# Patient Record
Sex: MALE | Race: WHITE | NOT HISPANIC OR LATINO | Employment: FULL TIME | URBAN - METROPOLITAN AREA
[De-identification: names, ages, dates, MRNs, and addresses within clinical notes are randomized per-mention and may not be internally consistent; named-entity substitution may affect disease eponyms.]

---

## 2017-02-21 ENCOUNTER — TELEPHONE (OUTPATIENT)
Dept: FAMILY MEDICINE | Facility: OTHER | Age: 57
End: 2017-02-21

## 2017-02-21 NOTE — TELEPHONE ENCOUNTER
Panel Management Review      Patient has the following on his problem list: None      Composite cancer screening  Chart review shows that this patient is due/due soon for the following None  Summary:    Patient is due/failing the following:   Glucose, BMP, lipids    Action needed:   Patient needs fasting lab only appointment    Type of outreach:    Phone, left message for patient to call back.     Questions for provider review:    None                                                                                                                                    Maryan House CMA       Chart routed to Care Team .

## 2017-04-03 ENCOUNTER — DOCUMENTATION ONLY (OUTPATIENT)
Dept: LAB | Facility: OTHER | Age: 57
End: 2017-04-03

## 2017-04-03 DIAGNOSIS — Z79.899 ENCOUNTER FOR LONG-TERM (CURRENT) USE OF MEDICATIONS: Primary | ICD-10-CM

## 2017-04-04 DIAGNOSIS — E78.5 HYPERLIPIDEMIA WITH TARGET LDL LESS THAN 130: ICD-10-CM

## 2017-04-04 DIAGNOSIS — Z79.899 ENCOUNTER FOR LONG-TERM (CURRENT) USE OF MEDICATIONS: ICD-10-CM

## 2017-04-04 DIAGNOSIS — R73.01 IMPAIRED FASTING GLUCOSE: ICD-10-CM

## 2017-04-04 LAB
BASOPHILS # BLD AUTO: 0 10E9/L (ref 0–0.2)
BASOPHILS NFR BLD AUTO: 0.2 %
DIFFERENTIAL METHOD BLD: ABNORMAL
EOSINOPHIL # BLD AUTO: 0 10E9/L (ref 0–0.7)
EOSINOPHIL NFR BLD AUTO: 0.6 %
ERYTHROCYTE [DISTWIDTH] IN BLOOD BY AUTOMATED COUNT: 11.6 % (ref 10–15)
ERYTHROCYTE [SEDIMENTATION RATE] IN BLOOD BY WESTERGREN METHOD: 10 MM/H (ref 0–20)
HCT VFR BLD AUTO: 42.3 % (ref 40–53)
HGB BLD-MCNC: 14.9 G/DL (ref 13.3–17.7)
LYMPHOCYTES # BLD AUTO: 1.6 10E9/L (ref 0.8–5.3)
LYMPHOCYTES NFR BLD AUTO: 25.2 %
MCH RBC QN AUTO: 33.5 PG (ref 26.5–33)
MCHC RBC AUTO-ENTMCNC: 35.2 G/DL (ref 31.5–36.5)
MCV RBC AUTO: 95 FL (ref 78–100)
MONOCYTES # BLD AUTO: 0.5 10E9/L (ref 0–1.3)
MONOCYTES NFR BLD AUTO: 8.5 %
NEUTROPHILS # BLD AUTO: 4.2 10E9/L (ref 1.6–8.3)
NEUTROPHILS NFR BLD AUTO: 65.5 %
PLATELET # BLD AUTO: 131 10E9/L (ref 150–450)
RBC # BLD AUTO: 4.45 10E12/L (ref 4.4–5.9)
WBC # BLD AUTO: 6.4 10E9/L (ref 4–11)

## 2017-04-04 PROCEDURE — 85025 COMPLETE CBC W/AUTO DIFF WBC: CPT | Performed by: FAMILY MEDICINE

## 2017-04-04 PROCEDURE — 36415 COLL VENOUS BLD VENIPUNCTURE: CPT | Performed by: FAMILY MEDICINE

## 2017-04-04 PROCEDURE — 82947 ASSAY GLUCOSE BLOOD QUANT: CPT | Performed by: FAMILY MEDICINE

## 2017-04-04 PROCEDURE — 80061 LIPID PANEL: CPT | Performed by: FAMILY MEDICINE

## 2017-04-04 PROCEDURE — 84450 TRANSFERASE (AST) (SGOT): CPT | Performed by: FAMILY MEDICINE

## 2017-04-04 PROCEDURE — 85652 RBC SED RATE AUTOMATED: CPT | Performed by: FAMILY MEDICINE

## 2017-04-04 PROCEDURE — 86140 C-REACTIVE PROTEIN: CPT | Performed by: FAMILY MEDICINE

## 2017-04-04 PROCEDURE — 82565 ASSAY OF CREATININE: CPT | Performed by: FAMILY MEDICINE

## 2017-04-05 LAB
AST SERPL W P-5'-P-CCNC: 15 U/L (ref 0–45)
CHOLEST SERPL-MCNC: 180 MG/DL
CREAT SERPL-MCNC: 0.95 MG/DL (ref 0.66–1.25)
CRP SERPL-MCNC: <2.9 MG/L (ref 0–8)
GFR SERPL CREATININE-BSD FRML MDRD: 81 ML/MIN/1.7M2
GLUCOSE SERPL-MCNC: 92 MG/DL (ref 70–99)
HDLC SERPL-MCNC: 50 MG/DL
LDLC SERPL CALC-MCNC: 101 MG/DL
NONHDLC SERPL-MCNC: 130 MG/DL
TRIGL SERPL-MCNC: 147 MG/DL

## 2017-04-05 NOTE — PROGRESS NOTES
Jaskaran, your blood sugar and cholesterol look good.  Please let me know if you have any questions.    Becki Fernandez MD

## 2017-05-11 ENCOUNTER — OFFICE VISIT (OUTPATIENT)
Dept: FAMILY MEDICINE | Facility: OTHER | Age: 57
End: 2017-05-11
Payer: COMMERCIAL

## 2017-05-11 VITALS
DIASTOLIC BLOOD PRESSURE: 68 MMHG | HEART RATE: 68 BPM | RESPIRATION RATE: 16 BRPM | BODY MASS INDEX: 27.32 KG/M2 | SYSTOLIC BLOOD PRESSURE: 110 MMHG | HEIGHT: 66 IN | WEIGHT: 170 LBS | TEMPERATURE: 98.4 F

## 2017-05-11 DIAGNOSIS — H53.123 TRANSIENT BLINDNESS OF BOTH EYES: Primary | ICD-10-CM

## 2017-05-11 LAB
ERYTHROCYTE [DISTWIDTH] IN BLOOD BY AUTOMATED COUNT: 11.7 % (ref 10–15)
HCT VFR BLD AUTO: 43.6 % (ref 40–53)
HGB BLD-MCNC: 15.4 G/DL (ref 13.3–17.7)
MCH RBC QN AUTO: 33.2 PG (ref 26.5–33)
MCHC RBC AUTO-ENTMCNC: 35.3 G/DL (ref 31.5–36.5)
MCV RBC AUTO: 94 FL (ref 78–100)
PLATELET # BLD AUTO: 142 10E9/L (ref 150–450)
RBC # BLD AUTO: 4.64 10E12/L (ref 4.4–5.9)
WBC # BLD AUTO: 5.3 10E9/L (ref 4–11)

## 2017-05-11 PROCEDURE — 84443 ASSAY THYROID STIM HORMONE: CPT | Performed by: FAMILY MEDICINE

## 2017-05-11 PROCEDURE — 85027 COMPLETE CBC AUTOMATED: CPT | Performed by: FAMILY MEDICINE

## 2017-05-11 PROCEDURE — 99214 OFFICE O/P EST MOD 30 MIN: CPT | Performed by: FAMILY MEDICINE

## 2017-05-11 PROCEDURE — 36415 COLL VENOUS BLD VENIPUNCTURE: CPT | Performed by: FAMILY MEDICINE

## 2017-05-11 PROCEDURE — 80053 COMPREHEN METABOLIC PANEL: CPT | Performed by: FAMILY MEDICINE

## 2017-05-11 ASSESSMENT — PAIN SCALES - GENERAL: PAINLEVEL: MILD PAIN (2)

## 2017-05-11 NOTE — NURSING NOTE
"Chief Complaint   Patient presents with     Headache       Initial /68 (BP Location: Right arm, Patient Position: Chair, Cuff Size: Adult Regular)  Pulse 68  Temp 98.4  F (36.9  C) (Temporal)  Resp 16  Ht 5' 6\" (1.676 m)  Wt 170 lb (77.1 kg)  BMI 27.44 kg/m2 Estimated body mass index is 27.44 kg/(m^2) as calculated from the following:    Height as of this encounter: 5' 6\" (1.676 m).    Weight as of this encounter: 170 lb (77.1 kg).  Medication Reconciliation: complete  "

## 2017-05-11 NOTE — PROGRESS NOTES
SUBJECTIVE:                                                    Jaskaran Car is a 57 year old male who presents to clinic today for the following health issues:      HPI    Headache     Onset: 1 day     Description:   Location: unilateral in the left frontal area, unilateral in the left temporal area, unilateral in the left occipital area   Character: squeezing pain  Frequency:  today  Duration:  All day    Intensity: mild    Progression of Symptoms:  same and intermittent    Accompanying Signs & Symptoms:  Stiff neck: YES  Neck or upper back pain: no   Fever: no  Sinus pressure: no  Nausea or vomiting: no  Dizziness: no  Numbness: no  Weakness: no  Visual changes: YES- Loss of vision - couple seconds    History:   Head trauma: no  Family history of migraines: no  Previous tests for headaches: no  Neurologist evaluations: no  Able to do daily activities: YES  Wake with a headaches: no   Do headaches wake you up: no   Daily pain medication use: no   Work/school stressors/changes: YES    Precipitating factors:   Does light make it worse: YES  Does sound make it worse: no    Alleviating factors:  Does sleep help: no         Therapies Tried and outcome: Nothing    NEURO: The patient reports that he experienced vision loss for 2 seconds while driving to work. He reports it as his vision turing black for 2 seconds. He notes no other issues after that incident but reports a mild headache upon waking up today. He denies any neurological symptoms.    Problem list and histories reviewed & adjusted, as indicated.  Additional history: as documented    Patient Active Problem List   Diagnosis     Displacement of lumbar intervertebral disc without myelopathy     Cholecystitis     Unspecified hyperplasia of prostate with urinary obstruction and other lower urinary tract symptoms (LUTS)     Advanced directives, counseling/discussion     Patellar tendinitis     Knee pain     Obstructive sleep apnea- CPAP at 5 cm water     Pain in  joint, ankle and foot     Inverse psoriasis     High risk medications (not anticoagulants) long-term use     Psoriatic arthritis (H)     High risk medication use     Thrombocytopenia (H)     Hyperlipidemia with target LDL less than 130     Impaired fasting glucose     Acute pharyngitis     Past Surgical History:   Procedure Laterality Date     ABDOMEN SURGERY       ARTHROSCOPY KNEE  8/30/2012    Procedure: ARTHROSCOPY KNEE;  Left knee arthroscopy, debridement and synovial biopsy;  Surgeon: Ivan Wiggins MD;  Location: [facility] OR     C STRESS TEST - REGULAR ([place])  2006    negative stress thallium     CHOLECYSTECTOMY, LAPOROSCOPIC  12/21/2006     COLONOSCOPY  09/30/08    neg, recheck in 10 years     EYE SURGERY       [facility] UGI ENDOSCOPY DIAG W OR W/O BRUSH/WASH  12/11/06    normal     KNEE SURGERY         Social History   Substance Use Topics     Smoking status: Never Smoker     Smokeless tobacco: Never Used      Comment: no smokers in the household     Alcohol use Yes      Comment: 3 to 4 drinks a week     Family History   Problem Relation Age of Onset     Lipids Father      medication     Coronary Artery Disease Father      Hypertension Father      medicaton     Hyperlipidemia Father      Arthritis Mother      rheumatoid     Arthritis Sister      Asthma No family hx of      C.A.D. No family hx of      DIABETES No family hx of      CEREBROVASCULAR DISEASE No family hx of      Breast Cancer No family hx of      Cancer - colorectal No family hx of      Prostate Cancer No family hx of      CANCER No family hx of      Blood Disease No family hx of      Alzheimer Disease No family hx of      Cardiovascular No family hx of      Circulatory No family hx of      Eye Disorder No family hx of      GASTROINTESTINAL DISEASE No family hx of      Genitourinary Problems No family hx of      HEART DISEASE No family hx of      Musculoskeletal Disorder No family hx of      Neurologic Disorder No family hx of      Respiratory No  "family hx of      Thyroid Disease No family hx of            ROS:  Constitutional, HEENT, cardiovascular, pulmonary, GI, , musculoskeletal, neuro, skin, endocrine and psych systems are negative, except as in HPI or otherwise noted     This document serves as a record of the services and decisions personally performed and made by Becki Fernandez MD. It was created on her behalf by Lesa Vega , a trained medical scribe. The creation of this document is based the provider's statements to the medical scribe.  Lesa Vega, May 11, 2017 3:23 PM     OBJECTIVE:                                                    /68 (BP Location: Right arm, Patient Position: Chair, Cuff Size: Adult Regular)  Pulse 68  Temp 98.4  F (36.9  C) (Temporal)  Resp 16  Ht 1.676 m (5' 6\")  Wt 77.1 kg (170 lb)  BMI 27.44 kg/m2  Body mass index is 27.44 kg/(m^2).   GENERAL: healthy, alert, well nourished, well hydrated, no distress  EYES: Eyes grossly normal to inspection, extraocular movements - intact, and PERRL, No papilledema   HENT: ear canals- normal; TMs- normal; Nose- normal; Mouth- no ulcers, no lesions  NECK: no tenderness, no adenopathy, no asymmetry, no masses, no stiffness; thyroid- normal to palpation  MS: extremities- no gross deformities noted, no edema  SKIN: no suspicious lesions, no rashes  NEURO: strength and tone- normal, sensory exam- grossly normal, mentation- intact, speech- normal, Facial neuro exam: grossly normal, cranial nerves 2-12 intact. Motor coordination; normal, negative dysdiadochokinesia  PSYCH: Alert and oriented times 3; speech- coherent , normal rate and volume; able to articulate logical thoughts, able to abstract reason, no tangential thoughts, no hallucinations or delusions, affect- normal    No results found for this or any previous visit (from the past 24 hour(s)).     ASSESSMENT/PLAN:                                                        ICD-10-CM    1. Transient blindness of both eyes " H53.123 Comprehensive metabolic panel (BMP + Alb, Alk Phos, ALT, AST, Total. Bili, TP)     TSH with free T4 reflex     CBC with platelets     MR Brain w/o & w Contrast     Significant symptom with the sudden loss of vision while driving.  Looks good now, will need further evaluation.  EEG and carotid US can be considered, but he was awake and aware throughout event.  Exam normal of the neck.  Will start with MRI brain and ask to return to his ophthalmologist (he states he has gone in the past and is overdue).    Patient Instructions   -possible transient blindness due to low blood sugar or blood volume.      The information in this document, created by the medical scribe for me, accurately reflects the services I personally performed and the decisions made by me. I have reviewed and approved this document for accuracy.   MD Becki Sexton MD, MD  [facility]

## 2017-05-11 NOTE — MR AVS SNAPSHOT
After Visit Summary   5/11/2017    Jaskaran Car    MRN: [id number]           Patient Information     Date Of Birth          1960        Visit Information        Provider Department      5/11/2017 3:30 PM Becki Fernandez MD [facility]        Today's Diagnoses     Transient blindness of both eyes    -  1      Care Instructions    -possible transient blindness due to low blood sugar or blood volume.        Follow-ups after your visit        Your next 10 appointments already scheduled     May 16, 2017  1:15 PM CDT   MR BRAIN W/O & W CONTRAST with [facility]   [facility]    [id number] [place] [id number] [phone]           Take your medicines as usual, unless your doctor tells you not to. Bring a list of your current medicines to your exam (including vitamins, minerals and over-the-counter drugs).  You will be given intravenous contrast for this exam. To prepare:   The day before your exam, drink extra fluids at least six 8-ounce glasses (unless your doctor tells you to restrict your fluids).   Have a blood test (creatinine test) within 30 days of your exam. Go to your clinic or Diagnostic Imaging Department for this test.  The MRI machine uses a strong magnet. Please wear clothes without metal (snaps, zippers). A sweatsuit works well, or we may give you a hospital gown.  Please remove any body piercings and hair extensions before you arrive. You will also remove watches, jewelry, hairpins, wallets, dentures, partial dental plates and hearing aids. You may wear contact lenses, and you may be able to wear your rings. We have a safe place to keep your personal items, but it is safer to leave them at home.   **IMPORTANT** THE INSTRUCTIONS BELOW ARE ONLY FOR THOSE PATIENTS WHO HAVE BEEN TOLD THEY WILL RECEIVE SEDATION OR GENERAL ANESTHESIA DURING THEIR MRI PROCEDURE:  IF YOU WILL RECEIVE SEDATION (take medicine to help you  relax during your exam):   You must get the medicine from your doctor before you arrive. Bring the medicine to the exam. Do not take it at home.   Arrive one hour early. Bring someone who can take you home after the test. Your medicine will make you sleepy. After the exam, you may not drive, take a bus or take a taxi by yourself.   No eating 8 hours before your exam. You may have clear liquids up until 4 hours before your exam. (Clear liquids include water, clear tea, black coffee and fruit juice without pulp.)  IF YOU WILL RECEIVE ANESTHESIA (be asleep for your exam):   Arrive 1 1/2 hours early. Bring someone who can take you home after the test. You may not drive, take a bus or take a taxi by yourself.   No eating 8 hours before your exam. You may have clear liquids up until 4 hours before your exam. (Clear liquids include water, clear tea, black coffee and fruit juice without pulp.)  Please call the Imaging Department at your exam site with any questions.              Future tests that were ordered for you today     Open Future Orders        Priority Expected Expires Ordered    MR Brain w/o & w Contrast Routine  5/11/2018 5/11/2017            Who to contact     If you have questions or need follow up information about today's clinic visit or your schedule please contact [facility] directly at [phone].  Normal or non-critical lab and imaging results will be communicated to you by teexteehart, letter or phone within 4 business days after the clinic has received the results. If you do not hear from us within 7 days, please contact the clinic through teexteehart or phone. If you have a critical or abnormal lab result, we will notify you by phone as soon as possible.  Submit refill requests through [employer] or call your pharmacy and they will forward the refill request to us. Please allow 3 business days for your refill to be completed.          Additional Information About Your Visit        [employer]  "Information     [facility] gives you secure access to your electronic health record. If you see a primary care provider, you can also send messages to your care team and make appointments. If you have questions, please call your primary care clinic.  If you do not have a primary care provider, please call [phone] and they will assist you.        Care EveryWhere ID     This is your Care EveryWhere ID. This could be used by other organizations to access your [place] medical records  [id number]        Your Vitals Were     Pulse Temperature Respirations Height BMI (Body Mass Index)       68 98.4  F (36.9  C) (Temporal) 16 5' 6\" (1.676 m) 27.44 kg/m2        Blood Pressure from Last 3 Encounters:   05/11/17 110/68   12/14/16 110/78   04/21/16 94/70    Weight from Last 3 Encounters:   05/11/17 170 lb (77.1 kg)   12/14/16 168 lb (76.2 kg)   04/21/16 167 lb (75.8 kg)              We Performed the Following     CBC with platelets     Comprehensive metabolic panel (BMP + Alb, Alk Phos, ALT, AST, Total. Bili, TP)     TSH with free T4 reflex        Primary Care Provider Office Phone # Fax #    Becki Jami Fernandez [phone]       [address] [address]        Thank you!     Thank you for choosing [facility]  for your care. Our goal is always to provide you with excellent care. Hearing back from our patients is one way we can continue to improve our services. Please take a few minutes to complete the written survey that you may receive in the mail after your visit with us. Thank you!             Your Updated Medication List - Protect others around you: Learn how to safely use, store and throw away your medicines at www.disposemymeds.org.          This list is accurate as of: 5/11/17  3:57 PM.  Always use your most recent med list.                   Brand Name Dispense Instructions for use    Fish Oil 1000 MG Cpdr      Take  by mouth daily.       HUMIRA PEN " 40 MG/0.8ML pen kit   Generic drug:  adalimumab          MULTI-VITAMIN DAILY PO      Take  by mouth daily.       predniSONE 5 MG tablet    DELTASONE         VITAMIN D3 PO      Take 5,000 Units by mouth daily       Zinc 15 MG Caps     30 capsule    Take 1 tablet by mouth daily

## 2017-05-12 LAB
ALBUMIN SERPL-MCNC: 3.9 G/DL (ref 3.4–5)
ALP SERPL-CCNC: 61 U/L (ref 40–150)
ALT SERPL W P-5'-P-CCNC: 32 U/L (ref 0–70)
ANION GAP SERPL CALCULATED.3IONS-SCNC: 6 MMOL/L (ref 3–14)
AST SERPL W P-5'-P-CCNC: 20 U/L (ref 0–45)
BILIRUB SERPL-MCNC: 0.5 MG/DL (ref 0.2–1.3)
BUN SERPL-MCNC: 12 MG/DL (ref 7–30)
CALCIUM SERPL-MCNC: 8.2 MG/DL (ref 8.5–10.1)
CHLORIDE SERPL-SCNC: 104 MMOL/L (ref 94–109)
CO2 SERPL-SCNC: 30 MMOL/L (ref 20–32)
CREAT SERPL-MCNC: 1 MG/DL (ref 0.66–1.25)
GFR SERPL CREATININE-BSD FRML MDRD: 77 ML/MIN/1.7M2
GLUCOSE SERPL-MCNC: 88 MG/DL (ref 70–99)
POTASSIUM SERPL-SCNC: 4 MMOL/L (ref 3.4–5.3)
PROT SERPL-MCNC: 7.1 G/DL (ref 6.8–8.8)
SODIUM SERPL-SCNC: 140 MMOL/L (ref 133–144)
TSH SERPL DL<=0.005 MIU/L-ACNC: 0.99 MU/L (ref 0.4–4)

## 2017-05-12 NOTE — PROGRESS NOTES
Jaskaran, your results were all normal.    Please let me know if you have any questions.    Becki Fernandez MD

## 2017-05-16 ENCOUNTER — RADIANT APPOINTMENT (OUTPATIENT)
Dept: MRI IMAGING | Facility: CLINIC | Age: 57
End: 2017-05-16
Attending: FAMILY MEDICINE
Payer: COMMERCIAL

## 2017-05-16 DIAGNOSIS — H53.123 TRANSIENT BLINDNESS OF BOTH EYES: ICD-10-CM

## 2017-05-16 PROCEDURE — A9585 GADOBUTROL INJECTION: HCPCS | Mod: JW | Performed by: RADIOLOGY

## 2017-05-16 PROCEDURE — 70553 MRI BRAIN STEM W/O & W/DYE: CPT | Performed by: RADIOLOGY

## 2017-05-16 PROCEDURE — 70543 MRI ORBT/FAC/NCK W/O &W/DYE: CPT | Performed by: RADIOLOGY

## 2017-05-16 RX ORDER — GADOBUTROL 604.72 MG/ML
7.5 INJECTION INTRAVENOUS ONCE
Status: COMPLETED | OUTPATIENT
Start: 2017-05-16 | End: 2017-05-16

## 2017-05-16 RX ADMIN — GADOBUTROL 7 ML: 604.72 INJECTION INTRAVENOUS at 13:46

## 2017-05-18 NOTE — PROGRESS NOTES
Jaskaran, your results were all normal.  Consider ophthalmologist next (eye doctor).  Please let me know if you have any questions.    Becki Fernandez MD

## 2017-09-28 DIAGNOSIS — Z79.899 LONG TERM USE OF DRUG: ICD-10-CM

## 2017-09-28 DIAGNOSIS — L40.59 POLYARTICULAR PSORIATIC ARTHRITIS (H): Primary | ICD-10-CM

## 2017-09-28 DIAGNOSIS — L40.59 POLYARTICULAR PSORIATIC ARTHRITIS (H): ICD-10-CM

## 2017-09-28 LAB
BASOPHILS # BLD AUTO: 0 10E9/L (ref 0–0.2)
BASOPHILS NFR BLD AUTO: 0.4 %
DIFFERENTIAL METHOD BLD: ABNORMAL
EOSINOPHIL # BLD AUTO: 0.1 10E9/L (ref 0–0.7)
EOSINOPHIL NFR BLD AUTO: 1.8 %
ERYTHROCYTE [DISTWIDTH] IN BLOOD BY AUTOMATED COUNT: 11.5 % (ref 10–15)
ERYTHROCYTE [SEDIMENTATION RATE] IN BLOOD BY WESTERGREN METHOD: 8 MM/H (ref 0–20)
HCT VFR BLD AUTO: 40.8 % (ref 40–53)
HGB BLD-MCNC: 14.7 G/DL (ref 13.3–17.7)
LYMPHOCYTES # BLD AUTO: 1.6 10E9/L (ref 0.8–5.3)
LYMPHOCYTES NFR BLD AUTO: 28.2 %
MCH RBC QN AUTO: 34.4 PG (ref 26.5–33)
MCHC RBC AUTO-ENTMCNC: 36 G/DL (ref 31.5–36.5)
MCV RBC AUTO: 96 FL (ref 78–100)
MONOCYTES # BLD AUTO: 0.7 10E9/L (ref 0–1.3)
MONOCYTES NFR BLD AUTO: 12 %
NEUTROPHILS # BLD AUTO: 3.2 10E9/L (ref 1.6–8.3)
NEUTROPHILS NFR BLD AUTO: 57.6 %
PLATELET # BLD AUTO: 144 10E9/L (ref 150–450)
RBC # BLD AUTO: 4.27 10E12/L (ref 4.4–5.9)
WBC # BLD AUTO: 5.5 10E9/L (ref 4–11)

## 2017-09-28 PROCEDURE — 84450 TRANSFERASE (AST) (SGOT): CPT | Performed by: INTERNAL MEDICINE

## 2017-09-28 PROCEDURE — 85652 RBC SED RATE AUTOMATED: CPT | Performed by: INTERNAL MEDICINE

## 2017-09-28 PROCEDURE — 85025 COMPLETE CBC W/AUTO DIFF WBC: CPT | Performed by: INTERNAL MEDICINE

## 2017-09-28 PROCEDURE — 36415 COLL VENOUS BLD VENIPUNCTURE: CPT | Performed by: INTERNAL MEDICINE

## 2017-09-28 PROCEDURE — 82565 ASSAY OF CREATININE: CPT | Performed by: INTERNAL MEDICINE

## 2017-09-28 PROCEDURE — 86140 C-REACTIVE PROTEIN: CPT | Performed by: INTERNAL MEDICINE

## 2017-09-29 LAB
AST SERPL W P-5'-P-CCNC: 18 U/L (ref 0–45)
CREAT SERPL-MCNC: 1 MG/DL (ref 0.66–1.25)
CRP SERPL-MCNC: <2.9 MG/L (ref 0–8)
GFR SERPL CREATININE-BSD FRML MDRD: 77 ML/MIN/1.7M2

## 2018-03-18 ENCOUNTER — HOSPITAL ENCOUNTER (EMERGENCY)
Facility: CLINIC | Age: 58
Discharge: HOME | End: 2018-03-18
Attending: FAMILY MEDICINE | Admitting: FAMILY MEDICINE
Payer: COMMERCIAL

## 2018-03-18 VITALS
SYSTOLIC BLOOD PRESSURE: 138 MMHG | RESPIRATION RATE: 20 BRPM | WEIGHT: 177.6 LBS | TEMPERATURE: 97.7 F | DIASTOLIC BLOOD PRESSURE: 86 MMHG | OXYGEN SATURATION: 97 % | HEIGHT: 66 IN | BODY MASS INDEX: 28.54 KG/M2

## 2018-03-18 DIAGNOSIS — J20.9 ACUTE BRONCHITIS, UNSPECIFIED ORGANISM: ICD-10-CM

## 2018-03-18 PROCEDURE — 99284 EMERGENCY DEPT VISIT MOD MDM: CPT | Mod: Z6 | Performed by: FAMILY MEDICINE

## 2018-03-18 PROCEDURE — 99283 EMERGENCY DEPT VISIT LOW MDM: CPT | Performed by: FAMILY MEDICINE

## 2018-03-18 RX ORDER — AZITHROMYCIN 250 MG/1
TABLET, FILM COATED ORAL
Qty: 6 TABLET | Refills: 0 | Status: SHIPPED | OUTPATIENT
Start: 2018-03-18 | End: 2018-03-23

## 2018-03-18 NOTE — DISCHARGE INSTRUCTIONS
Take the Zithromax as directed.     Nasal saline irrigation can be helpful for the postnasal drainage.  Recheck in clinic if persistent problems.  Return to the ED if worse/concerns.  It was nice visiting with you this morning.  I hope you feel better soon so you can enjoy your trip to [place].  Have fun diving!    Thank you for choosing [facility]. We appreciate the opportunity to meet your urgent medical needs. Please let us know if we could have done anything to make your stay more satisfying.    After discharge, please closely monitor for any new or worsening symptoms. Return to the Emergency Department if you develop any acute worsening signs or symptoms.    If you had lab work, cultures or imaging studies done during your stay, the final results may still be pending. We will call you if your plan of care needs to change. However, if you are not improving as expected, please follow up with your primary care provider or clinic.     Start any prescription medications that were prescribed to you and take them as directed.     Please see additional handouts that may be pertinent to your condition.        Bronchitis, Antibiotic Treatment (Adult)    Bronchitis is an infection of the air passages (bronchial tubes) in your lungs. It often occurs when you have a cold. This illness is contagious during the first few days and is spread through the air by coughing and sneezing, or by direct contact (touching the sick person and then touching your own eyes, nose, or mouth).  Symptoms of bronchitis include cough with mucus (phlegm) and low-grade fever. Bronchitis usually lasts 7 to 14 days. Mild cases can be treated with simple home remedies. More severe infection is treated with an antibiotic.  Home care  Follow these guidelines when caring for yourself at home:    If your symptoms are severe, rest at home for the first 2 to 3 days. When you go back to your usual activities, don't let yourself get too  tired.    Do not smoke. Also avoid being exposed to secondhand smoke.    You may use over-the-counter medicines to control fever or pain, unless another medicine was prescribed. (Note: If you have chronic liver or kidney disease or have ever had a stomach ulcer or gastrointestinal bleeding, talk with your healthcare provider before using these medicines. Also talk to your provider if you are taking medicine to prevent blood clots.) Aspirin should never be given to anyone younger than 18 years of age who is ill with a viral infection or fever. It may cause severe liver or brain damage.    Your appetite may be poor, so a light diet is fine. Avoid dehydration by drinking 6 to 8 glasses of fluids per day (such as water, soft drinks, sports drinks, juices, tea, or soup). Extra fluids will help loosen secretions in the nose and lungs.    Over-the-counter cough, cold, and sore-throat medicines will not shorten the length of the illness, but they may be helpful to reduce symptoms. (Note: Do not use decongestants if you have high blood pressure.)    Finish all antibiotic medicine. Do this even if you are feeling better after only a few days.  Follow-up care  Follow up with your healthcare provider, or as advised. If you had an X-ray or ECG (electrocardiogram), a specialist will review it. You will be notified of any new findings that may affect your care.  Note: If you are age 65 or older, or if you have a chronic lung disease or condition that affects your immune system, or you smoke, talk to your healthcare provider about having pneumococcal vaccinations and a yearly influenza vaccination (flu shot).  When to seek medical advice  Call your healthcare provider right away if any of these occur:    Fever of 100.4 F (38 C) or higher    Coughing up increased amounts of colored sputum    Weakness, drowsiness, headache, facial pain, ear pain, or a stiff neck  Call 911, or get immediate medical care  Contact emergency services  right away if any of these occur.    Coughing up blood    Worsening weakness, drowsiness, headache, or stiff neck    Trouble breathing, wheezing, or pain with breathing  Date Last Reviewed: 9/13/2015 2000-2017 [employer]. [address] [place] [address], [place] [address]. All rights reserved. This information is not intended as a substitute for professional medical care. Always follow your healthcare professional's instructions.

## 2018-03-18 NOTE — ED AVS SNAPSHOT
[facility] Emergency Department    [address] [place] [address]    Phone:  [phone]    Fax:  [phone]                                       Jaskaran Car   MRN: [id number]    Department:  [facility] Emergency Department   Date of Visit:  3/18/2018           After Visit Summary Signature Page     I have received my discharge instructions, and my questions have been answered. I have discussed any challenges I see with this plan with the nurse or doctor.    ..........................................................................................................................................  Patient/Patient Representative Signature      ..........................................................................................................................................  Patient Representative Print Name and Relationship to Patient    ..................................................               ................................................  Date                                            Time    ..........................................................................................................................................  Reviewed by Signature/Title    ...................................................              ..............................................  Date                                                            Time

## 2018-03-18 NOTE — ED AVS SNAPSHOT
[facility] Emergency Department    [address]     [place] [address]    Phone:  [phone]    Fax:  [phone]                                       Jaskaran Car   MRN: [id number]    Department:  [facility] Emergency Department   Date of Visit:  3/18/2018           Patient Information     Date Of Birth          1960        Your diagnoses for this visit were:     Acute bronchitis, unspecified organism        You were seen by Reji Espinoza MD.      Follow-up Information     Follow up with Becki Fernandez MD.    Specialty:  Family Practice    Contact information:    [address] [facility] [address]  [phone]          Discharge Instructions       Take the Zithromax as directed.     Nasal saline irrigation can be helpful for the postnasal drainage.  Recheck in clinic if persistent problems.  Return to the ED if worse/concerns.  It was nice visiting with you this morning.  I hope you feel better soon so you can enjoy your trip to [place].  Have fun diving!    Thank you for choosing [facility]. We appreciate the opportunity to meet your urgent medical needs. Please let us know if we could have done anything to make your stay more satisfying.    After discharge, please closely monitor for any new or worsening symptoms. Return to the Emergency Department if you develop any acute worsening signs or symptoms.    If you had lab work, cultures or imaging studies done during your stay, the final results may still be pending. We will call you if your plan of care needs to change. However, if you are not improving as expected, please follow up with your primary care provider or clinic.     Start any prescription medications that were prescribed to you and take them as directed.     Please see additional handouts that may be pertinent to your condition.        Bronchitis, Antibiotic Treatment (Adult)    Bronchitis is an infection of the air passages (bronchial  tubes) in your lungs. It often occurs when you have a cold. This illness is contagious during the first few days and is spread through the air by coughing and sneezing, or by direct contact (touching the sick person and then touching your own eyes, nose, or mouth).  Symptoms of bronchitis include cough with mucus (phlegm) and low-grade fever. Bronchitis usually lasts 7 to 14 days. Mild cases can be treated with simple home remedies. More severe infection is treated with an antibiotic.  Home care  Follow these guidelines when caring for yourself at home:    If your symptoms are severe, rest at home for the first 2 to 3 days. When you go back to your usual activities, don't let yourself get too tired.    Do not smoke. Also avoid being exposed to secondhand smoke.    You may use over-the-counter medicines to control fever or pain, unless another medicine was prescribed. (Note: If you have chronic liver or kidney disease or have ever had a stomach ulcer or gastrointestinal bleeding, talk with your healthcare provider before using these medicines. Also talk to your provider if you are taking medicine to prevent blood clots.) Aspirin should never be given to anyone younger than 18 years of age who is ill with a viral infection or fever. It may cause severe liver or brain damage.    Your appetite may be poor, so a light diet is fine. Avoid dehydration by drinking 6 to 8 glasses of fluids per day (such as water, soft drinks, sports drinks, juices, tea, or soup). Extra fluids will help loosen secretions in the nose and lungs.    Over-the-counter cough, cold, and sore-throat medicines will not shorten the length of the illness, but they may be helpful to reduce symptoms. (Note: Do not use decongestants if you have high blood pressure.)    Finish all antibiotic medicine. Do this even if you are feeling better after only a few days.  Follow-up care  Follow up with your healthcare provider, or as advised. If you had an X-ray or  ECG (electrocardiogram), a specialist will review it. You will be notified of any new findings that may affect your care.  Note: If you are age 65 or older, or if you have a chronic lung disease or condition that affects your immune system, or you smoke, talk to your healthcare provider about having pneumococcal vaccinations and a yearly influenza vaccination (flu shot).  When to seek medical advice  Call your healthcare provider right away if any of these occur:    Fever of 100.4 F (38 C) or higher    Coughing up increased amounts of colored sputum    Weakness, drowsiness, headache, facial pain, ear pain, or a stiff neck  Call 911, or get immediate medical care  Contact emergency services right away if any of these occur.    Coughing up blood    Worsening weakness, drowsiness, headache, or stiff neck    Trouble breathing, wheezing, or pain with breathing  Date Last Reviewed: 9/13/2015 2000-2017 [employer]. [address] [place] [address]. All rights reserved. This information is not intended as a substitute for professional medical care. Always follow your healthcare professional's instructions.          24 Hour Appointment Hotline       To make an appointment at any [facility], call [phone] ([phone]). If you don't have a family doctor or clinic, we will help you find one. [facility] clinics are conveniently located to serve the needs of you and your family.             Review of your medicines      START taking        Dose / Directions Last dose taken    azithromycin 250 MG tablet   Commonly known as:  ZITHROMAX Z-MAYELIN   Quantity:  6 tablet        Two tablets on the first day, then one tablet daily for the next 4 days   Refills:  0          Our records show that you are taking the medicines listed below. If these are incorrect, please call your family doctor or clinic.        Dose / Directions Last dose taken    Fish Oil 1000 MG Cpdr        Take  by mouth daily.    Refills:  0        HUMIRA PEN 40 MG/0.8ML pen kit   Generic drug:  adalimumab        Refills:  0        MULTI-VITAMIN DAILY PO        Take  by mouth daily.   Refills:  0        predniSONE 5 MG tablet   Commonly known as:  DELTASONE        Refills:  0        VITAMIN D3 PO   Dose:  5000 Units        Take 5,000 Units by mouth daily   Refills:  0        Zinc 15 MG Caps   Dose:  1 tablet   Quantity:  30 capsule        Take 1 tablet by mouth daily   Refills:  0                Prescriptions were sent or printed at these locations (1 Prescription)                   [facility] - [address] [address]    Telephone:  [phone]   Fax:  [phone]   Hours:                  E-Prescribed (1 of 1)         azithromycin (ZITHROMAX Z-MAYELIN) 250 MG tablet                Orders Needing Specimen Collection     None      Pending Results     No orders found from 3/16/2018 to 3/19/2018.            Pending Culture Results     No orders found from 3/16/2018 to 3/19/2018.            Pending Results Instructions     If you had any lab results that were not finalized at the time of your Discharge, you can call the ED Lab Result RN at [phone]. You will be contacted by this team for any positive Lab results or changes in treatment. The nurses are available 7 days a week from 10A to 6:30P.  You can leave a message 24 hours per day and they will return your call.        Thank you for choosing [facility]       Thank you for choosing [facility] for your care. Our goal is always to provide you with excellent care. Hearing back from our patients is one way we can continue to improve our services. Please take a few minutes to complete the written survey that you may receive in the mail after you visit with us. Thank you!        [facility] Information     [employer] gives you secure access to your electronic health record. If you see a primary care provider, you can also send  messages to your care team and make appointments. If you have questions, please call your primary care clinic.  If you do not have a primary care provider, please call [phone] and they will assist you.        Care EveryWhere ID     This is your Care EveryWhere ID. This could be used by other organizations to access your [facility] medical records  [id number]        Equal Access to Services     MIGDALIA CAMERON : Abhay Chavez, marleni tolliver, deniz brown, leticia moya . So [facility] [phone].    ATENCIÓN: Si habla español, tiene a canchola disposición servicios gratuitos de asistencia lingüística. Yanna al [phone].    We comply with applicable federal civil rights laws and [place] laws. We do not discriminate on the basis of race, color, national origin, age, disability, sex, sexual orientation, or gender identity.            After Visit Summary       This is your record. Keep this with you and show to your community pharmacist(s) and doctor(s) at your next visit.

## 2018-03-18 NOTE — ED NOTES
Reports cough and SOB that started Feb 28, states symptoms have gotten worse the past 2 day and he is unable to sleep d/t coughing.

## 2018-03-18 NOTE — ED PROVIDER NOTES
History     Chief Complaint   Patient presents with     Cough     HPI  Jaskaran Car is a 57 year old male who resents to the ED with increasing cough.  This started about February 28 when he got home from a weeklong trip in [place].  Started with a bit of a cough and some congestion just before coming home.  Thought he just had a regular cold and was improving as expected.  Was almost feeling back to normal but then his symptoms worsened again over the past couple of days.  Now coughing up yellowish sputum.  Denies any fevers, chills or sweats.  Lots of postnasal drainage peer    He has psoriatic arthritis and is on prednisone and Humira, so is somewhat immunocompromised.    Problem List:    Patient Active Problem List    Diagnosis Date Noted     High risk medication use 06/12/2013     Priority: High     Thrombocytopenia (H) 06/12/2013     Priority: High     Diagnosis updated by automated process. Provider to review and confirm.       Psoriatic arthritis (H) 12/21/2012     Priority: High     Acute pharyngitis 08/12/2015     Priority: Medium     Impaired fasting glucose 07/13/2015     Priority: Medium     Hyperlipidemia with target LDL less than 130 07/08/2013     Priority: Medium     Diagnosis updated by automated process. Provider to review and confirm.       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 Recommended that he makes a copy for his medical record.        Unspecified hyperplasia of prostate with urinary obstruction and other lower urinary  tract symptoms (LUTS) 03/17/2008     Priority: Medium     Cholecystitis 11/28/2006     Priority: Medium     Problem list name updated by automated process. Provider to review       Displacement of lumbar intervertebral disc without myelopathy 12/23/2002     Priority: Medium     L4/5          Past Medical History:    Past Medical History:   Diagnosis Date     Arthritis      Displacement of lumbar intervertebral disc without myelopathy 10/02     External hemorrhoids      Leukopenia 5/1/2013     Leukopenia      Obstructive sleep apnea- CPAP at 5 cm water 2/23/2012     Right ankle pain June 2006     Right knee pain summer 2007     Thrombocytopaenia 6/12/2013     Thrombocytopenia (H)        Past Surgical History:    Past Surgical History:   Procedure Laterality Date     ABDOMEN SURGERY       ARTHROSCOPY KNEE  8/30/2012    Procedure: ARTHROSCOPY KNEE;  Left knee arthroscopy, debridement and synovial biopsy;  Surgeon: Ivan Wiggins MD;  Location: [facility] OR     C STRESS TEST - REGULAR (FL)  2006    negative stress thallium     CHOLECYSTECTOMY, LAPOROSCOPIC  12/21/2006     COLONOSCOPY  09/30/08    neg, recheck in 10 years     EYE SURGERY       [facility] UGI ENDOSCOPY DIAG W OR W/O BRUSH/WASH  12/11/06    normal     KNEE SURGERY         Family History:    Family History   Problem Relation Age of Onset     Lipids Father      medication     Coronary Artery Disease Father      Hypertension Father      medicaton     Hyperlipidemia Father      Arthritis Mother      rheumatoid     Arthritis Sister      Asthma No family hx of      C.A.D. No family hx of      DIABETES No family hx of      CEREBROVASCULAR DISEASE No family hx of      Breast Cancer No family hx of      Cancer - colorectal No family hx of      Prostate Cancer No family hx of      CANCER No family hx of      Blood Disease No family hx of      Alzheimer Disease No family hx of      Cardiovascular No family hx of      Circulatory No family hx of      Eye Disorder No  "family hx of      GASTROINTESTINAL DISEASE No family hx of      Genitourinary Problems No family hx of      HEART DISEASE No family hx of      Musculoskeletal Disorder No family hx of      Neurologic Disorder No family hx of      Respiratory No family hx of      Thyroid Disease No family hx of        Social History:  Marital Status:   [2]  Social History   Substance Use Topics     Smoking status: Never Smoker     Smokeless tobacco: Never Used      Comment: no smokers in the household     Alcohol use Yes      Comment: 3 to 4 drinks a week        Medications:      azithromycin (ZITHROMAX Z-MAYELIN) 250 MG tablet   Zinc 15 MG CAPS   predniSONE (DELTASONE) 5 MG tablet   HUMIRA PEN 40 MG/0.8ML pen kit   Cholecalciferol (VITAMIN D3 PO)   Multiple Vitamin (MULTI-VITAMIN DAILY PO)   Omega-3 Fatty Acids (FISH OIL) 1000 MG CPDR         Review of Systems   All other systems reviewed and are negative.      Physical Exam   BP: 138/86  Heart Rate: 68  Temp: 97.7  F (36.5  C)  Resp: 20  Height: 167.6 cm (5' 6\")  Weight: 80.6 kg (177 lb 9.6 oz)  SpO2: 95 %      Physical Exam   Constitutional: He is oriented to person, place, and time. He appears well-developed and well-nourished. No distress.   HENT:   Right Ear: External ear normal.   Left Ear: External ear normal.   Mouth/Throat: Oropharynx is clear and moist.   Small amount of crust and cloudy discharge bilaterally   Eyes: EOM are normal.   Neck: Neck supple.   Pulmonary/Chest: Effort normal. No respiratory distress. He has no decreased breath sounds. He has no wheezes. He has rhonchi (few scattered). He has no rales.   Musculoskeletal: Normal range of motion.   Neurological: He is alert and oriented to person, place, and time.   Skin: Skin is warm and dry.   Well-tanned from his recent trip to [place]   Psychiatric: He has a normal mood and affect.       ED Course    He describes a typical viral respiratory illness which lasted a couple of weeks and was almost resolved but " "then worsened again (the \"second sickening\")  He is somewhat immunocompromised with his Humira and prednisone use.   I suspect he has now developed a secondary bacterial bronchitis so will treat him accordingly.     We discussed chest x-ray but it is not likely to change anything we do as I am going to treat him with antibiotics even if the x-ray is clear.   If he worsens or his symptoms change, we can always proceed on with further workup.  He is comfortable with that plan.          ED Course     Procedures               Critical Care time:  none               No results found for this or any previous visit (from the past 24 hour(s)).    Medications - No data to display    Assessments & Plan (with Medical Decision Making)    (J20.9) Acute bronchitis, unspecified organism  Comment:   Plan: azithromycin (ZITHROMAX Z-MAYELIN) 250 MG tablet        Recheck if worse/changes.        I have reviewed the nursing notes.    I have reviewed the findings, diagnosis, plan and need for follow up with the patient.       New Prescriptions    AZITHROMYCIN (ZITHROMAX Z-MAYELIN) 250 MG TABLET    Two tablets on the first day, then one tablet daily for the next 4 days       Final diagnoses:   Acute bronchitis, unspecified organism       3/18/2018   [facility] EMERGENCY DEPARTMENT     Reji Espinoza MD  03/18/18 0602    "

## 2018-03-29 ENCOUNTER — TELEPHONE (OUTPATIENT)
Dept: LAB | Facility: OTHER | Age: 58
End: 2018-03-29

## 2018-03-29 DIAGNOSIS — Z79.899 OTHER LONG TERM (CURRENT) DRUG THERAPY: ICD-10-CM

## 2018-03-29 DIAGNOSIS — L40.50 PSORIATIC ARTHROPATHY (H): Primary | ICD-10-CM

## 2018-03-29 DIAGNOSIS — L40.50 PSORIATIC ARTHROPATHY (H): ICD-10-CM

## 2018-03-29 DIAGNOSIS — R73.01 IMPAIRED FASTING GLUCOSE: Primary | ICD-10-CM

## 2018-03-29 DIAGNOSIS — E78.5 HYPERLIPIDEMIA WITH TARGET LDL LESS THAN 130: ICD-10-CM

## 2018-03-29 LAB
AST SERPL W P-5'-P-CCNC: 20 U/L (ref 0–45)
BASOPHILS # BLD AUTO: 0 10E9/L (ref 0–0.2)
BASOPHILS NFR BLD AUTO: 0.3 %
CREAT SERPL-MCNC: 0.89 MG/DL (ref 0.66–1.25)
CRP SERPL-MCNC: <2.9 MG/L (ref 0–8)
DIFFERENTIAL METHOD BLD: ABNORMAL
EOSINOPHIL # BLD AUTO: 0.1 10E9/L (ref 0–0.7)
EOSINOPHIL NFR BLD AUTO: 1.1 %
ERYTHROCYTE [DISTWIDTH] IN BLOOD BY AUTOMATED COUNT: 11.6 % (ref 10–15)
ERYTHROCYTE [SEDIMENTATION RATE] IN BLOOD BY WESTERGREN METHOD: 9 MM/H (ref 0–20)
GFR SERPL CREATININE-BSD FRML MDRD: 88 ML/MIN/1.7M2
HCT VFR BLD AUTO: 42.3 % (ref 40–53)
HGB BLD-MCNC: 14.9 G/DL (ref 13.3–17.7)
LYMPHOCYTES # BLD AUTO: 1.7 10E9/L (ref 0.8–5.3)
LYMPHOCYTES NFR BLD AUTO: 26 %
MCH RBC QN AUTO: 33.6 PG (ref 26.5–33)
MCHC RBC AUTO-ENTMCNC: 35.2 G/DL (ref 31.5–36.5)
MCV RBC AUTO: 95 FL (ref 78–100)
MONOCYTES # BLD AUTO: 0.5 10E9/L (ref 0–1.3)
MONOCYTES NFR BLD AUTO: 8.3 %
NEUTROPHILS # BLD AUTO: 4.2 10E9/L (ref 1.6–8.3)
NEUTROPHILS NFR BLD AUTO: 64.3 %
PLATELET # BLD AUTO: 131 10E9/L (ref 150–450)
RBC # BLD AUTO: 4.44 10E12/L (ref 4.4–5.9)
WBC # BLD AUTO: 6.5 10E9/L (ref 4–11)

## 2018-03-29 PROCEDURE — 84450 TRANSFERASE (AST) (SGOT): CPT

## 2018-03-29 PROCEDURE — 85025 COMPLETE CBC W/AUTO DIFF WBC: CPT

## 2018-03-29 PROCEDURE — 82565 ASSAY OF CREATININE: CPT

## 2018-03-29 PROCEDURE — 36415 COLL VENOUS BLD VENIPUNCTURE: CPT

## 2018-03-29 PROCEDURE — 86140 C-REACTIVE PROTEIN: CPT

## 2018-03-29 PROCEDURE — 85652 RBC SED RATE AUTOMATED: CPT

## 2018-03-29 NOTE — TELEPHONE ENCOUNTER
Per health maintenance only due for lipid, this has been pended. Will route to AE to review/place further orders if necessary

## 2018-03-29 NOTE — TELEPHONE ENCOUNTER
Patient is coming in for lab today   Please place orders as needed.  Thank you.  Malka GUILLEN) [facility]

## 2018-04-30 ENCOUNTER — TELEPHONE (OUTPATIENT)
Dept: FAMILY MEDICINE | Facility: OTHER | Age: 58
End: 2018-04-30

## 2018-04-30 NOTE — TELEPHONE ENCOUNTER
Panel Management Review      Patient has the following on his problem list: None      Composite cancer screening  Chart review shows that this patient is due/due soon for the following None  Summary:    Patient is due/failing the following:   HIV, Lipid, BMP, Glucose, Honoring Choices    Action needed:   Patient needs office visit for Physical. and Patient needs fasting lab only appointment    Type of outreach:    Phone, spoke to patient.  Patient will call back later today to get apponitments set up.    Questions for provider review:    None                                                                                                                                    Naz Chowdary CMA (AAMA)       Chart routed to Care Team .

## 2018-06-22 NOTE — TELEPHONE ENCOUNTER
Patient has not scheduled, called and spoke with pt. He is still trying to find a good day to do his physical and labs. This is on his radar and he will try to do this over the summer.

## 2018-08-03 NOTE — PROGRESS NOTES
SUBJECTIVE:   CC: Jaskaran Car is an 58 year old male who presents for preventative health visit.     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 Jaskaran reports he regularly uses his CPAP machine for his sleep apnea, and asks if there is someone he should be checking in with regarding his machine. He follows with rheumatologist for his psoriatic arthritis, and takes Humira. He has had a waxing and waning cough for about a month, but he feels like it is getting better now. He reports no change in his urinary or prostate issues in the last 5 years to his memory. He has no concerns about a hernia at this time. Jaskaran does complain of chronic left shoulder pain, which he sees a PT and massage therapist, but to no resolve.       Answers for HPI/ROS submitted by the patient on 8/8/2018   PHQ-2 Score: 0    Today's PHQ-2 Score:   PHQ-2 ( 1999 [employer]) 12/7/2016   Q1: Little interest or pleasure in doing things -   Q2: Feeling down, depressed or hopeless -   PHQ-2 Score -   Q1: Little interest or pleasure in doing things Not at all   Q2: Feeling down, depressed or hopeless Not at all   PHQ-2 Score 0       Abuse: Current or Past(Physical, Sexual or Emotional)- No  Do you feel safe in your environment - Yes    Social History   Substance Use Topics     Smoking status: Never Smoker     Smokeless tobacco: Never Used      Comment: no smokers in the household     Alcohol use Yes      Comment: 3 to 4 drinks a week     No flowsheet data found.No flowsheet data  found.    Last PSA:   PSA   Date Value Ref Range Status   07/15/2013 0.33 0 - 4 ug/L Final       Reviewed orders with patient. Reviewed health maintenance and updated orders accordingly - Yes  Labs reviewed in EPIC  BP Readings from Last 3 Encounters:   08/09/18 110/62   03/18/18 138/86   05/11/17 110/68    Wt Readings from Last 3 Encounters:   08/09/18 82.6 kg (182 lb)   03/18/18 80.6 kg (177 lb 9.6 oz)   05/11/17 77.1 kg (170 lb)                    Reviewed and updated as needed this visit by clinical staff         Reviewed and updated as needed this visit by Provider        Past Medical History:   Diagnosis Date     Arthritis     psoriatic     Displacement of lumbar intervertebral disc without myelopathy 10/02    L4/5 disc herniation with L4 nerve root impingement bilaterally     External hemorrhoids      Leukopenia 5/1/2013     Leukopenia      Obstructive sleep apnea- CPAP at 5 cm water 2/23/2012     Right ankle pain June 2006    MRI Nov 2006 or so     Right knee pain summer 2007    MRI Nov 2008     Thrombocytopaenia 6/12/2013     Thrombocytopenia (H)       Past Surgical History:   Procedure Laterality Date     ABDOMEN SURGERY       ARTHROSCOPY KNEE  8/30/2012    Procedure: ARTHROSCOPY KNEE;  Left knee arthroscopy, debridement and synovial biopsy;  Surgeon: Ivan Wiggins MD;  Location: [facility]     C STRESS TEST - REGULAR ([place])  2006    negative stress thallium     CHOLECYSTECTOMY, LAPOROSCOPIC  12/21/2006     COLONOSCOPY  09/30/08    neg, recheck in 10 years     EYE SURGERY       [facility] UGI ENDOSCOPY DIAG W OR W/O BRUSH/WASH  12/11/06    normal     KNEE SURGERY         Review of Systems   Constitutional: Negative for chills and fever.   HENT: Negative for congestion, ear pain, hearing loss and sore throat.    Eyes: Negative for pain and visual disturbance.   Respiratory: Positive for cough. Negative for shortness of breath.    Cardiovascular: Negative for chest pain, palpitations and peripheral edema.  "  Gastrointestinal: Negative for abdominal pain, constipation, diarrhea, heartburn, hematochezia and nausea.   Genitourinary: Negative for discharge, dysuria, frequency, genital sores, hematuria, impotence and urgency.   Musculoskeletal: Positive for arthralgias, joint swelling and myalgias.   Skin: Negative for rash.   Neurological: Positive for headaches. Negative for dizziness, weakness and paresthesias.   Psychiatric/Behavioral: Negative for mood changes. The patient is not nervous/anxious.      This document serves as a record of the services and decisions personally performed and made by Becki Fernandez MD. It was created on her behalf by Jimenez Toro, a trained medical scribe. The creation of this document is based the provider's statements to the medical scribe.  Jimenez Toro, August 9, 2018 3:33 PM      OBJECTIVE:   /62  Pulse 73  Temp 97.6  F (36.4  C) (Temporal)  Ht 1.715 m (5' 7.5\")  Wt 82.6 kg (182 lb)  SpO2 95%  BMI 28.08 kg/m2    Physical Exam   Musculoskeletal:        Left shoulder: Normal.     GENERAL: healthy, alert and no distress  HENT: ear canals and TM's normal, nose and mouth without ulcers or lesions  RESP: lungs clear to auscultation - no rales, rhonchi or wheezes  CV: regular rate and rhythm, normal S1 S2, no S3 or S4, no murmur, click or rub, no peripheral edema and peripheral pulses strong  ABDOMEN: soft, nontender, no hepatosplenomegaly, no masses and bowel sounds normal  MS: no gross musculoskeletal defects noted, no edema  SKIN: no suspicious lesions or rashes to visible skin  NEURO: Normal strength and tone, mentation intact and speech normal  PSYCH: mentation appears normal, affect normal/bright  RECTAL: offered to patient, but was declined at this time  Back Exam     Range of Motion   Flexion:                    Normal  Extension:                Normal  Lateral Bend Left:    Normal  Lateral Bend Right:  Normal  Rotation Right:         Abnormal  Rotation Left:           " Normal    Comments:  Neck: no radiation of pain with movement. Some stiffness in left neck with rotation.    Left Shoulder Exam   Left shoulder exam is normal.    Range of Motion   Normal left shoulder ROM    Muscle Strength   Normal left shoulder strength  Abduction:            5/5  Internal Rotation:  5/5  External Rotation: 5/5  Supraspinatus:     5/5  Subscapularis:     5/5  Biceps:                 5/5    Tests   Impingement:   Negative    Comments:  No grinding noted            No results found for this or any previous visit (from the past 24 hour(s)).    ASSESSMENT/PLAN:       ICD-10-CM    1. Encounter for routine adult health examination without abnormal findings Z00.00    2. Screening for HIV (human immunodeficiency virus) Z11.4    3. Hyperlipidemia with target LDL less than 130 E78.5    4. Impaired fasting glucose R73.01      BPH:  Offered prostate cancer screening and medication for urinary flow, which pt declined at this time. Completed AUA BPH symptom questionnaire, scored 12. Discussed treatment, but will proceed with watchful waiting at this time.     Left shoulder pain:  No structural abnormalities noted. Pain is likely due to neck stiffness and previously noted degenerative disk at c5-c6. Talked about surgical options and risks involved in surgery, would plan MRI only if considering surgical consult. Continue with PT and massage therapy as planned.    Health Maintenance:  Advise that if he needs supplies for his CPAP machine, he will need to send in the card for the machine.      Advance Directive: I have verified the patient's ablity to prepare an advanced directive/make health care decisions. Literature was provided to assist patient in preparing an advanced directive.      COUNSELING:   Reviewed preventive health counseling, as reflected in patient instructions       Prostate cancer screening       Advance Care Planning    BP Readings from Last 1 Encounters:   03/18/18 138/86     Estimated body  "mass index is 28.67 kg/(m^2) as calculated from the following:    Height as of 3/18/18: 5' 6\" (1.676 m).    Weight as of 3/18/18: 177 lb 9.6 oz (80.6 kg).      Weight management plan: Discussed healthy diet and exercise guidelines and patient will follow up in 12 months in clinic to re-evaluate.     reports that he has never smoked. He has never used smokeless tobacco.      Counseling Resources:  ATP IV Guidelines  Pooled Cohorts Equation Calculator  FRAX Risk Assessment  ICSI Preventive Guidelines  Dietary Guidelines for Americans, 2010  USDA's MyPlate  ASA Prophylaxis  Lung CA Screening    Becki Fernandez MD, MD  [facility]  "

## 2018-08-03 NOTE — PATIENT INSTRUCTIONS
Preventive Health Recommendations  Male Ages 50   64    Yearly exam:             See your health care provider every year in order to  o   Review health changes.   o   Discuss preventive care.    o   Review your medicines if your doctor has prescribed any.     Have a cholesterol test every 5 years, or more frequently if you are at risk for high cholesterol/heart disease.     Have a diabetes test (fasting glucose) every three years. If you are at risk for diabetes, you should have this test more often.     Have a colonoscopy at age 50, or have a yearly FIT test (stool test). These exams will check for colon cancer.      Talk with your health care provider about whether or not a prostate cancer screening test (PSA) is right for you.    You should be tested each year for STDs (sexually transmitted diseases), if you re at risk.     Shots: Get a flu shot each year. Get a tetanus shot every 10 years.     Nutrition:    Eat at least 5 servings of fruits and vegetables daily.     Eat whole-grain bread, whole-wheat pasta and brown rice instead of white grains and rice.     Get adequate Calcium and Vitamin D.     Lifestyle    Exercise for at least 150 minutes a week (30 minutes a day, 5 days a week). This will help you control your weight and prevent disease.     Limit alcohol to one drink per day.     No smoking.     Wear sunscreen to prevent skin cancer.     See your dentist every six months for an exam and cleaning.     See your eye doctor every 1 to 2 years.  Discussed and provided information and/or forms for completing an advance directive. After you have completed your advance directive, bring it in to the clinic to be entered into your chart. It needs to be signed by either 2 witnesses or a notary. A notary is available on staff most days of the week. More information and copies of these forms can be found at [email or web address]

## 2018-08-08 ASSESSMENT — ENCOUNTER SYMPTOMS
ARTHRALGIAS: 1
WEAKNESS: 0
PARESTHESIAS: 0
HEMATURIA: 0
SHORTNESS OF BREATH: 0
HEARTBURN: 0
DIZZINESS: 0
HEADACHES: 1
NERVOUS/ANXIOUS: 0
HEMATOCHEZIA: 0
CHILLS: 0
DIARRHEA: 0
ABDOMINAL PAIN: 0
FEVER: 0
JOINT SWELLING: 1
DYSURIA: 0
NAUSEA: 0
CONSTIPATION: 0
PALPITATIONS: 0
SORE THROAT: 0
EYE PAIN: 0
FREQUENCY: 0
MYALGIAS: 1
COUGH: 1

## 2018-08-09 ENCOUNTER — OFFICE VISIT (OUTPATIENT)
Dept: FAMILY MEDICINE | Facility: OTHER | Age: 58
End: 2018-08-09
Payer: COMMERCIAL

## 2018-08-09 VITALS
TEMPERATURE: 97.6 F | SYSTOLIC BLOOD PRESSURE: 110 MMHG | WEIGHT: 182 LBS | HEART RATE: 73 BPM | HEIGHT: 68 IN | BODY MASS INDEX: 27.58 KG/M2 | OXYGEN SATURATION: 95 % | DIASTOLIC BLOOD PRESSURE: 62 MMHG

## 2018-08-09 DIAGNOSIS — R73.01 IMPAIRED FASTING GLUCOSE: ICD-10-CM

## 2018-08-09 DIAGNOSIS — Z71.89 ADVANCED DIRECTIVES, COUNSELING/DISCUSSION: ICD-10-CM

## 2018-08-09 DIAGNOSIS — N40.1 BENIGN PROSTATIC HYPERPLASIA WITH URINARY FREQUENCY: ICD-10-CM

## 2018-08-09 DIAGNOSIS — G47.33 OBSTRUCTIVE SLEEP APNEA: ICD-10-CM

## 2018-08-09 DIAGNOSIS — R35.0 BENIGN PROSTATIC HYPERPLASIA WITH URINARY FREQUENCY: ICD-10-CM

## 2018-08-09 DIAGNOSIS — Z11.4 SCREENING FOR HIV (HUMAN IMMUNODEFICIENCY VIRUS): ICD-10-CM

## 2018-08-09 DIAGNOSIS — E78.5 HYPERLIPIDEMIA WITH TARGET LDL LESS THAN 130: ICD-10-CM

## 2018-08-09 DIAGNOSIS — Z00.00 ENCOUNTER FOR ROUTINE ADULT HEALTH EXAMINATION WITHOUT ABNORMAL FINDINGS: Primary | ICD-10-CM

## 2018-08-09 DIAGNOSIS — L40.50 PSORIATIC ARTHRITIS (H): ICD-10-CM

## 2018-08-09 LAB
ANION GAP SERPL CALCULATED.3IONS-SCNC: 10 MMOL/L (ref 3–14)
BUN SERPL-MCNC: 22 MG/DL (ref 7–30)
CALCIUM SERPL-MCNC: 8.5 MG/DL (ref 8.5–10.1)
CHLORIDE SERPL-SCNC: 103 MMOL/L (ref 94–109)
CHOLEST SERPL-MCNC: 215 MG/DL
CO2 SERPL-SCNC: 29 MMOL/L (ref 20–32)
CREAT SERPL-MCNC: 1.08 MG/DL (ref 0.66–1.25)
GFR SERPL CREATININE-BSD FRML MDRD: 70 ML/MIN/1.7M2
GLUCOSE SERPL-MCNC: 86 MG/DL (ref 70–99)
HDLC SERPL-MCNC: 39 MG/DL
LDLC SERPL CALC-MCNC: 143 MG/DL
NONHDLC SERPL-MCNC: 176 MG/DL
POTASSIUM SERPL-SCNC: 3.9 MMOL/L (ref 3.4–5.3)
SODIUM SERPL-SCNC: 142 MMOL/L (ref 133–144)
TRIGL SERPL-MCNC: 164 MG/DL

## 2018-08-09 PROCEDURE — 80061 LIPID PANEL: CPT | Performed by: FAMILY MEDICINE

## 2018-08-09 PROCEDURE — 99396 PREV VISIT EST AGE 40-64: CPT | Performed by: FAMILY MEDICINE

## 2018-08-09 PROCEDURE — 80048 BASIC METABOLIC PNL TOTAL CA: CPT | Performed by: FAMILY MEDICINE

## 2018-08-09 PROCEDURE — 36415 COLL VENOUS BLD VENIPUNCTURE: CPT | Performed by: FAMILY MEDICINE

## 2018-08-09 ASSESSMENT — ENCOUNTER SYMPTOMS
ABDOMINAL PAIN: 0
HEADACHES: 1
HEARTBURN: 0
CONSTIPATION: 0
EYE PAIN: 0
NERVOUS/ANXIOUS: 0
CHILLS: 0
NAUSEA: 0
FEVER: 0
HEMATURIA: 0
FREQUENCY: 0
PARESTHESIAS: 0
DIARRHEA: 0
DYSURIA: 0
COUGH: 1
DIZZINESS: 0
SHORTNESS OF BREATH: 0
JOINT SWELLING: 1
PALPITATIONS: 0
HEMATOCHEZIA: 0
SORE THROAT: 0
MYALGIAS: 1
WEAKNESS: 0
ARTHRALGIAS: 1

## 2018-08-09 NOTE — MR AVS SNAPSHOT
After Visit Summary   8/9/2018    Jaskaran Car    MRN: [id number]           Patient Information     Date Of Birth          1960        Visit Information        Provider Department      8/9/2018 3:30 PM Becki Fernandez MD [facility]        Today's Diagnoses     Encounter for routine adult health examination without abnormal findings    -  1    Screening for HIV (human immunodeficiency virus)        Hyperlipidemia with target LDL less than 130        Impaired fasting glucose        Obstructive sleep apnea- CPAP at 5 cm water        Advanced directives, counseling/discussion        Benign prostatic hyperplasia with urinary frequency        Psoriatic arthritis (H)          Care Instructions      Preventive Health Recommendations  Male Ages 50 - 64    Yearly exam:             See your health care provider every year in order to  o   Review health changes.   o   Discuss preventive care.    o   Review your medicines if your doctor has prescribed any.     Have a cholesterol test every 5 years, or more frequently if you are at risk for high cholesterol/heart disease.     Have a diabetes test (fasting glucose) every three years. If you are at risk for diabetes, you should have this test more often.     Have a colonoscopy at age 50, or have a yearly FIT test (stool test). These exams will check for colon cancer.      Talk with your health care provider about whether or not a prostate cancer screening test (PSA) is right for you.    You should be tested each year for STDs (sexually transmitted diseases), if you re at risk.     Shots: Get a flu shot each year. Get a tetanus shot every 10 years.     Nutrition:    Eat at least 5 servings of fruits and vegetables daily.     Eat whole-grain bread, whole-wheat pasta and brown rice instead of white grains and rice.     Get adequate Calcium and Vitamin D.     Lifestyle    Exercise for at least 150 minutes a week (30 minutes a day, 5 days a week).  This will help you control your weight and prevent disease.     Limit alcohol to one drink per day.     No smoking.     Wear sunscreen to prevent skin cancer.     See your dentist every six months for an exam and cleaning.     See your eye doctor every 1 to 2 years.  Discussed and provided information and/or forms for completing an advance directive. After you have completed your advance directive, bring it in to the clinic to be entered into your chart. It needs to be signed by either 2 witnesses or a notary. A notary is available on staff most days of the week. More information and copies of these forms can be found at [email or web address]            Follow-ups after your visit        Who to contact     If you have questions or need follow up information about today's clinic visit or your schedule please contact [facility] directly at [phone].  Normal or non-critical lab and imaging results will be communicated to you by [employer], letter or phone within 4 business days after the clinic has received the results. If you do not hear from us within 7 days, please contact the clinic through [employer] or phone. If you have a critical or abnormal lab result, we will notify you by phone as soon as possible.  Submit refill requests through [employer] or call your pharmacy and they will forward the refill request to us. Please allow 3 business days for your refill to be completed.          Additional Information About Your Visit        [employer] Information     [employer] gives you secure access to your electronic health record. If you see a primary care provider, you can also send messages to your care team and make appointments. If you have questions, please call your primary care clinic.  If you do not have a primary care provider, please call [phone] and they will assist you.        Care EveryWhere ID     This is your Care EveryWhere ID. This could be used by other organizations to access your [place]  "medical records  [id number]        Your Vitals Were     Pulse Temperature Height Pulse Oximetry BMI (Body Mass Index)       73 97.6  F (36.4  C) (Temporal) 5' 7.5\" (1.715 m) 95% 28.08 kg/m2        Blood Pressure from Last 3 Encounters:   08/09/18 110/62   03/18/18 138/86   05/11/17 110/68    Weight from Last 3 Encounters:   08/09/18 182 lb (82.6 kg)   03/18/18 177 lb 9.6 oz (80.6 kg)   05/11/17 170 lb (77.1 kg)              We Performed the Following     BASIC METABOLIC PANEL     Lipid panel reflex to direct LDL Fasting        Primary Care Provider Office Phone # Fax #    Becki Fernandez [phone]       [address] [place] [address]        Equal Access to Services     [facility]: Hadii ilene leavitt Soroxanne, waaxda luqadaha, qaybta kaalmada adearleen, leticia moya . So [facility] [phone].    ATENCIÓN: Si habla español, tiene a canchola disposición servicios gratuitos de asistencia lingüística. Llame al [phone].    We comply with applicable federal civil rights laws and [place] laws. We do not discriminate on the basis of race, color, national origin, age, disability, sex, sexual orientation, or gender identity.            Thank you!     Thank you for choosing [facility]  for your care. Our goal is always to provide you with excellent care. Hearing back from our patients is one way we can continue to improve our services. Please take a few minutes to complete the written survey that you may receive in the mail after your visit with us. Thank you!             Your Updated Medication List - Protect others around you: Learn how to safely use, store and throw away your medicines at www.disposemymeds.org.          This list is accurate as of 8/9/18  3:53 PM.  Always use your most recent med list.                   Brand Name Dispense Instructions for use Diagnosis    Fish Oil 1000 MG Cpdr      Take  by mouth daily.        HUMIRA PEN 40 MG/0.8ML " pen kit   Generic drug:  adalimumab           MULTI-VITAMIN DAILY PO      Take  by mouth daily.        predniSONE 5 MG tablet    DELTASONE          VITAMIN D3 PO      Take 5,000 Units by mouth daily        Zinc 15 MG Caps     30 capsule    Take 1 tablet by mouth daily    Impaired fasting glucose

## 2018-08-13 NOTE — PROGRESS NOTES
Jaskaran, your cholesterol results have increased this last year. Not enough to need immediate meds, but would work on lifestyle changes in the next 6-12 months and recheck.   Please let me know if you have any questions.    Becki Fernandez MD

## 2018-09-27 DIAGNOSIS — L40.50 PSORIATIC ARTHROPATHY (H): Primary | ICD-10-CM

## 2018-09-28 DIAGNOSIS — L40.50 PSORIATIC ARTHROPATHY (H): ICD-10-CM

## 2018-09-28 LAB
AST SERPL W P-5'-P-CCNC: 26 U/L (ref 0–45)
BASOPHILS # BLD AUTO: 0 10E9/L (ref 0–0.2)
BASOPHILS NFR BLD AUTO: 0.5 %
CREAT SERPL-MCNC: 1.05 MG/DL (ref 0.66–1.25)
CRP SERPL-MCNC: <2.9 MG/L (ref 0–8)
DIFFERENTIAL METHOD BLD: ABNORMAL
EOSINOPHIL # BLD AUTO: 0.1 10E9/L (ref 0–0.7)
EOSINOPHIL NFR BLD AUTO: 1.2 %
ERYTHROCYTE [DISTWIDTH] IN BLOOD BY AUTOMATED COUNT: 11.9 % (ref 10–15)
ERYTHROCYTE [SEDIMENTATION RATE] IN BLOOD BY WESTERGREN METHOD: 9 MM/H (ref 0–20)
GFR SERPL CREATININE-BSD FRML MDRD: 72 ML/MIN/1.7M2
HCT VFR BLD AUTO: 42.4 % (ref 40–53)
HGB BLD-MCNC: 15 G/DL (ref 13.3–17.7)
LYMPHOCYTES # BLD AUTO: 1 10E9/L (ref 0.8–5.3)
LYMPHOCYTES NFR BLD AUTO: 24 %
MCH RBC QN AUTO: 33.5 PG (ref 26.5–33)
MCHC RBC AUTO-ENTMCNC: 35.4 G/DL (ref 31.5–36.5)
MCV RBC AUTO: 95 FL (ref 78–100)
MONOCYTES # BLD AUTO: 0.5 10E9/L (ref 0–1.3)
MONOCYTES NFR BLD AUTO: 11.6 %
NEUTROPHILS # BLD AUTO: 2.7 10E9/L (ref 1.6–8.3)
NEUTROPHILS NFR BLD AUTO: 62.7 %
PLATELET # BLD AUTO: 127 10E9/L (ref 150–450)
RBC # BLD AUTO: 4.48 10E12/L (ref 4.4–5.9)
WBC # BLD AUTO: 4.3 10E9/L (ref 4–11)

## 2018-09-28 PROCEDURE — 85652 RBC SED RATE AUTOMATED: CPT | Performed by: INTERNAL MEDICINE

## 2018-09-28 PROCEDURE — 36415 COLL VENOUS BLD VENIPUNCTURE: CPT | Performed by: INTERNAL MEDICINE

## 2018-09-28 PROCEDURE — 84450 TRANSFERASE (AST) (SGOT): CPT | Performed by: INTERNAL MEDICINE

## 2018-09-28 PROCEDURE — 86140 C-REACTIVE PROTEIN: CPT | Performed by: INTERNAL MEDICINE

## 2018-09-28 PROCEDURE — 82565 ASSAY OF CREATININE: CPT | Performed by: INTERNAL MEDICINE

## 2018-09-28 PROCEDURE — 85025 COMPLETE CBC W/AUTO DIFF WBC: CPT | Performed by: INTERNAL MEDICINE

## 2019-09-06 ENCOUNTER — OFFICE VISIT (OUTPATIENT)
Dept: FAMILY MEDICINE | Facility: CLINIC | Age: 59
End: 2019-09-06
Payer: COMMERCIAL

## 2019-09-06 VITALS
RESPIRATION RATE: 14 BRPM | DIASTOLIC BLOOD PRESSURE: 80 MMHG | BODY MASS INDEX: 27.2 KG/M2 | HEIGHT: 68 IN | SYSTOLIC BLOOD PRESSURE: 104 MMHG | HEART RATE: 66 BPM | OXYGEN SATURATION: 96 % | TEMPERATURE: 98.3 F | WEIGHT: 179.5 LBS

## 2019-09-06 DIAGNOSIS — J40 BRONCHITIS: Primary | ICD-10-CM

## 2019-09-06 DIAGNOSIS — Z00.00 ROUTINE MEDICAL EXAM: ICD-10-CM

## 2019-09-06 DIAGNOSIS — Z12.11 SCREENING FOR COLON CANCER: ICD-10-CM

## 2019-09-06 DIAGNOSIS — D69.6 THROMBOCYTOPENIA (H): ICD-10-CM

## 2019-09-06 DIAGNOSIS — L40.50 PSORIATIC ARTHRITIS (H): ICD-10-CM

## 2019-09-06 PROCEDURE — 99214 OFFICE O/P EST MOD 30 MIN: CPT | Performed by: NURSE PRACTITIONER

## 2019-09-06 RX ORDER — AZITHROMYCIN 250 MG/1
TABLET, FILM COATED ORAL
Qty: 6 TABLET | Refills: 0 | Status: ON HOLD | OUTPATIENT
Start: 2019-09-06 | End: 2019-09-30

## 2019-09-06 RX ORDER — BENZONATATE 200 MG/1
200 CAPSULE ORAL 3 TIMES DAILY PRN
Qty: 30 CAPSULE | Refills: 0 | Status: ON HOLD | OUTPATIENT
Start: 2019-09-06 | End: 2019-09-30

## 2019-09-06 ASSESSMENT — MIFFLIN-ST. JEOR: SCORE: 1590.77

## 2019-09-06 NOTE — PROGRESS NOTES
Subjective     Jaskaran Car is a 59 year old male who presents to clinic today for the following health issues:    History of Present Illness        He eats 0-1 servings of fruits and vegetables daily.He consumes 1 sweetened beverage(s) daily.  He is taking medications regularly.     Acute Illness   Acute illness concerns: cough  Onset: 3 weeks    Fever: no     Chills/Sweats: no     Headache (location?): YES    Sinus Pressure:no    Conjunctivitis:  no    Ear Pain: no    Rhinorrhea: YES    Congestion: no     Sore Throat: no      Cough: YES-productive of green sputum    Wheeze: YES and BRAUN    Decreased Appetite: no     Nausea: no     Vomiting: no     Diarrhea:  no     Dysuria/Freq.: no     Fatigue/Achiness: no     Sick/Strep Exposure: no      Therapies Tried and outcome: Mucinex         Patient Active Problem List   Diagnosis     Displacement of lumbar intervertebral disc without myelopathy     Cholecystitis     Benign prostatic hyperplasia with urinary frequency     Advanced directives, counseling/discussion     Patellar tendinitis     Knee pain     Obstructive sleep apnea- CPAP at 5 cm water     Pain in joint, ankle and foot     Inverse psoriasis     High risk medications (not anticoagulants) long-term use     Psoriatic arthritis (H)     High risk medication use     Thrombocytopenia (H)     Hyperlipidemia with target LDL less than 130     Impaired fasting glucose     Acute pharyngitis     Past Surgical History:   Procedure Laterality Date     ABDOMEN SURGERY       ARTHROSCOPY KNEE  8/30/2012    Procedure: ARTHROSCOPY KNEE;  Left knee arthroscopy, debridement and synovial biopsy;  Surgeon: Ivan Wiggins MD;  Location: [facility]     C STRESS TEST - REGULAR ([place])  2006    negative stress thallium     CHOLECYSTECTOMY, LAPOROSCOPIC  12/21/2006     COLONOSCOPY  09/30/08    neg, recheck in 10 years     EYE SURGERY        UGI ENDOSCOPY DIAG W OR W/O BRUSH/WASH  12/11/06    normal     KNEE SURGERY          Social History     Tobacco Use     Smoking status: Never Smoker     Smokeless tobacco: Never Used     Tobacco comment: no smokers in the household   Substance Use Topics     Alcohol use: Yes     Comment: 3 to 4 drinks a week     Family History   Problem Relation Age of Onset     Lipids Father         medication     Coronary Artery Disease Father      Hypertension Father         medicaton     Hyperlipidemia Father      Arthritis Mother         rheumatoid     Arthritis Sister      Asthma No family hx of      C.A.D. No family hx of      Diabetes No family hx of      Cerebrovascular Disease No family hx of      Breast Cancer No family hx of      Cancer - colorectal No family hx of      Prostate Cancer No family hx of      Cancer No family hx of      Blood Disease No family hx of      Alzheimer Disease No family hx of      Cardiovascular No family hx of      Circulatory No family hx of      Eye Disorder No family hx of      Gastrointestinal Disease No family hx of      Genitourinary Problems No family hx of      Heart Disease No family hx of      Musculoskeletal Disorder No family hx of      Neurologic Disorder No family hx of      Respiratory No family hx of      Thyroid Disease No family hx of          Current Outpatient Medications   Medication Sig Dispense Refill     azithromycin (ZITHROMAX) 250 MG tablet 2 tablets daily on day one, then one tablet po daily until gone. 6 tablet 0     benzonatate (TESSALON) 200 MG capsule Take 1 capsule (200 mg) by mouth 3 times daily as needed for cough 30 capsule 0     Cholecalciferol (VITAMIN D3 PO) Take 5,000 Units by mouth daily       HUMIRA PEN 40 MG/0.8ML pen kit   0     Multiple Vitamin (MULTI-VITAMIN DAILY PO) Take  by mouth daily.       Omega-3 Fatty Acids (FISH OIL) 1000 MG CPDR Take  by mouth daily.       predniSONE (DELTASONE) 5 MG tablet   0     Zinc 15 MG CAPS Take 1 tablet by mouth daily 30 capsule      Allergies   Allergen Reactions     Oxycontin [Oxycodone  "Hydrochloride-Polysorbate 80] Itching     Recent Labs   Lab Test 09/28/18  0959 08/09/18  1601  05/11/17  1551 04/04/17  1548  09/10/15  1454 08/12/15  1536  06/09/14  1520   LDL  --  143*  --   --  101*  --  117  --    < >  --    HDL  --  39*  --   --  50  --  56  --    < >  --    TRIG  --  164*  --   --  147  --  69  --    < >  --    ALT  --   --   --  32  --   --   --  42  --  31   CR 1.05 1.08   < > 1.00 0.95   < > 1.00 1.30*   < >  --    GFRESTIMATED 72 70   < > 77 81   < > 77 57*   < >  --    GFRESTBLACK 87 85   < > >90   GFR Calc   >90   GFR Calc     < > >90   GFR Calc   69   < >  --    POTASSIUM  --  3.9  --  4.0  --   --  3.7 3.8   < >  --    TSH  --   --   --  0.99  --   --   --   --   --   --     < > = values in this interval not displayed.      BP Readings from Last 3 Encounters:   09/06/19 104/80   08/09/18 110/62   03/18/18 138/86    Wt Readings from Last 3 Encounters:   09/06/19 81.4 kg (179 lb 8 oz)   08/09/18 82.6 kg (182 lb)   03/18/18 80.6 kg (177 lb 9.6 oz)        Reviewed and updated as needed this visit by Provider         Review of Systems   ROS COMP: Constitutional, HEENT, cardiovascular, pulmonary, GI, , musculoskeletal, neuro, skin, endocrine and psych systems are negative, except as otherwise noted.    This document serves as a record of the services and decisions personally performed and made by Massiel Rojo CNP. It was created on his/her behalf by Pamela Dolan, trained medical scribe. The creation of this document is based the provider's statements to the medical scribes.    Suhail Dolan 3:21 PM, September 6, 2019      Objective    /80   Pulse 66   Temp 98.3  F (36.8  C) (Oral)   Resp 14   Ht 1.715 m (5' 7.5\")   Wt 81.4 kg (179 lb 8 oz)   SpO2 96%   BMI 27.70 kg/m    Body mass index is 27.7 kg/m .     Physical Exam   GENERAL APPEARANCE: healthy, alert and no distress  EYES: Eyes grossly normal to inspection and " "conjunctivae and sclerae normal  HENT: ear canals and TM's normal, nose and mouth without ulcers or lesions and nasal mucosa edematous with mild rhinorrhea  NECK: no adenopathy, no asymmetry, masses, or scars and thyroid normal to palpation  RESP: lungs clear to auscultation - no rales, rhonchi or wheezes, dry cough noted  CV: regular rates and rhythm, normal S1 S2, no S3 or S4 and no murmur, click or rub    Diagnostic Test Results:  Labs reviewed in Epic  No results found for this or any previous visit (from the past 24 hour(s)).        Assessment & Plan       ICD-10-CM    1. Bronchitis J40 azithromycin (ZITHROMAX) 250 MG tablet     benzonatate (TESSALON) 200 MG capsule   2. Psoriatic arthritis (H) L40.50    3. Thrombocytopenia (H) D69.6    4. Routine medical exam Z00.00 TSH with free T4 reflex     Lipid panel reflex to direct LDL Fasting     Prostate spec antigen screen     Glucose   5. Screening for colon cancer Z12.11 GASTROENTEROLOGY ADULT REF PROCEDURE ONLY [facility] [phone]      Reviewed symptoms and etiology patient presents with today. Advised starting zithromax 250 mgand tessalon 200 mg; Rx provided. Reviewed directions, benefits, and side effects of medication with patient today. Discussed home cares, keeping up fluids, rest, and covering cough with good hand washing. Treating as immunocompromise potential and on prednisone.     Due for labs and PE. Setting up with PCP.    Gastroenterology referral provided for patient to schedule his screening colonoscopy.    Patient declined influenza vaccination as he receives one through his employer.     Follow up with PCP in 4 weeks for annual physical exam with fasting lab work or prn.        BMI:   Estimated body mass index is 27.7 kg/m  as calculated from the following:    Height as of this encounter: 1.715 m (5' 7.5\").    Weight as of this encounter: 81.4 kg (179 lb 8 oz).   Weight management plan: Discussed healthy diet and exercise " guidelines    The information in this document, created by the medical scribe for me, accurately reflects the services I personally performed and the decisions made by me. I have reviewed and approved this document for accuracy prior to leaving the patient care area.  Massiel Rojo CNP  3:21 PM, 09/06/19    TIFFANI Rome CNP  [facility]

## 2019-09-09 ENCOUNTER — TELEPHONE (OUTPATIENT)
Dept: FAMILY MEDICINE | Facility: OTHER | Age: 59
End: 2019-09-09

## 2019-09-09 NOTE — TELEPHONE ENCOUNTER
Date of colonoscopy/EGD: 09/30 2:00  Surgeon: Dr. Garcia  Prep:Miralax  Packet:Colonoscopy/EGD instructions mailed to patient's home address.   Date: 9/9/2019      Surgery Scheduler  Becki THOMAS

## 2019-09-09 NOTE — TELEPHONE ENCOUNTER
Left message for patient to return call to schedule EGD/colonoscopy. If Olivia or Yesy are not available, please transfer to same day surgery

## 2019-09-30 ENCOUNTER — ANESTHESIA EVENT (OUTPATIENT)
Dept: GASTROENTEROLOGY | Facility: CLINIC | Age: 59
End: 2019-09-30
Payer: COMMERCIAL

## 2019-09-30 ENCOUNTER — ANESTHESIA (OUTPATIENT)
Dept: GASTROENTEROLOGY | Facility: CLINIC | Age: 59
End: 2019-09-30
Payer: COMMERCIAL

## 2019-09-30 ENCOUNTER — HOSPITAL ENCOUNTER (OUTPATIENT)
Facility: CLINIC | Age: 59
Discharge: HOME | End: 2019-09-30
Attending: FAMILY MEDICINE | Admitting: FAMILY MEDICINE
Payer: COMMERCIAL

## 2019-09-30 ENCOUNTER — SURGERY (OUTPATIENT)
Age: 59
End: 2019-09-30
Payer: COMMERCIAL

## 2019-09-30 VITALS
OXYGEN SATURATION: 98 % | DIASTOLIC BLOOD PRESSURE: 79 MMHG | SYSTOLIC BLOOD PRESSURE: 103 MMHG | TEMPERATURE: 97.9 F | HEART RATE: 54 BPM | RESPIRATION RATE: 16 BRPM

## 2019-09-30 DIAGNOSIS — Z00.00 ROUTINE MEDICAL EXAM: ICD-10-CM

## 2019-09-30 LAB
CHOLEST SERPL-MCNC: 200 MG/DL
COLONOSCOPY: NORMAL
GLUCOSE SERPL-MCNC: 108 MG/DL (ref 70–99)
HDLC SERPL-MCNC: 57 MG/DL
LDLC SERPL CALC-MCNC: 103 MG/DL
NONHDLC SERPL-MCNC: 143 MG/DL
PSA SERPL-ACNC: 0.35 UG/L (ref 0–4)
TRIGL SERPL-MCNC: 198 MG/DL
TSH SERPL DL<=0.005 MIU/L-ACNC: 1.26 MU/L (ref 0.4–4)

## 2019-09-30 PROCEDURE — 25000125 ZZHC RX 250: Performed by: NURSE ANESTHETIST, CERTIFIED REGISTERED

## 2019-09-30 PROCEDURE — 82947 ASSAY GLUCOSE BLOOD QUANT: CPT | Performed by: FAMILY MEDICINE

## 2019-09-30 PROCEDURE — 25800030 ZZH RX IP 258 OP 636: Performed by: NURSE ANESTHETIST, CERTIFIED REGISTERED

## 2019-09-30 PROCEDURE — 25000128 H RX IP 250 OP 636: Performed by: NURSE ANESTHETIST, CERTIFIED REGISTERED

## 2019-09-30 PROCEDURE — 37000008 ZZH ANESTHESIA TECHNICAL FEE, 1ST 30 MIN: Performed by: FAMILY MEDICINE

## 2019-09-30 PROCEDURE — G0121 COLON CA SCRN NOT HI RSK IND: HCPCS | Performed by: FAMILY MEDICINE

## 2019-09-30 PROCEDURE — 36415 COLL VENOUS BLD VENIPUNCTURE: CPT | Performed by: FAMILY MEDICINE

## 2019-09-30 PROCEDURE — G0103 PSA SCREENING: HCPCS | Performed by: FAMILY MEDICINE

## 2019-09-30 PROCEDURE — 25000125 ZZHC RX 250: Performed by: FAMILY MEDICINE

## 2019-09-30 PROCEDURE — 45378 DIAGNOSTIC COLONOSCOPY: CPT | Performed by: FAMILY MEDICINE

## 2019-09-30 PROCEDURE — 80061 LIPID PANEL: CPT | Performed by: FAMILY MEDICINE

## 2019-09-30 PROCEDURE — 37000009 ZZH ANESTHESIA TECHNICAL FEE, EACH ADDTL 15 MIN: Performed by: FAMILY MEDICINE

## 2019-09-30 PROCEDURE — 84443 ASSAY THYROID STIM HORMONE: CPT | Performed by: FAMILY MEDICINE

## 2019-09-30 RX ORDER — LIDOCAINE 40 MG/G
CREAM TOPICAL
Status: DISCONTINUED | OUTPATIENT
Start: 2019-09-30 | End: 2019-09-30 | Stop reason: HOSPADM

## 2019-09-30 RX ORDER — NALOXONE HYDROCHLORIDE 0.4 MG/ML
.1-.4 INJECTION, SOLUTION INTRAMUSCULAR; INTRAVENOUS; SUBCUTANEOUS
Status: DISCONTINUED | OUTPATIENT
Start: 2019-09-30 | End: 2019-09-30 | Stop reason: HOSPADM

## 2019-09-30 RX ORDER — SODIUM CHLORIDE, SODIUM LACTATE, POTASSIUM CHLORIDE, CALCIUM CHLORIDE 600; 310; 30; 20 MG/100ML; MG/100ML; MG/100ML; MG/100ML
INJECTION, SOLUTION INTRAVENOUS CONTINUOUS
Status: DISCONTINUED | OUTPATIENT
Start: 2019-09-30 | End: 2019-09-30 | Stop reason: HOSPADM

## 2019-09-30 RX ORDER — PROPOFOL 10 MG/ML
INJECTION, EMULSION INTRAVENOUS PRN
Status: DISCONTINUED | OUTPATIENT
Start: 2019-09-30 | End: 2019-09-30

## 2019-09-30 RX ORDER — ONDANSETRON 2 MG/ML
4 INJECTION INTRAMUSCULAR; INTRAVENOUS
Status: DISCONTINUED | OUTPATIENT
Start: 2019-09-30 | End: 2019-09-30 | Stop reason: HOSPADM

## 2019-09-30 RX ORDER — ONDANSETRON 4 MG/1
4 TABLET, ORALLY DISINTEGRATING ORAL EVERY 6 HOURS PRN
Status: DISCONTINUED | OUTPATIENT
Start: 2019-09-30 | End: 2019-09-30 | Stop reason: HOSPADM

## 2019-09-30 RX ORDER — PROPOFOL 10 MG/ML
INJECTION, EMULSION INTRAVENOUS CONTINUOUS PRN
Status: DISCONTINUED | OUTPATIENT
Start: 2019-09-30 | End: 2019-09-30

## 2019-09-30 RX ORDER — ONDANSETRON 2 MG/ML
4 INJECTION INTRAMUSCULAR; INTRAVENOUS EVERY 6 HOURS PRN
Status: DISCONTINUED | OUTPATIENT
Start: 2019-09-30 | End: 2019-09-30 | Stop reason: HOSPADM

## 2019-09-30 RX ORDER — FLUMAZENIL 0.1 MG/ML
0.2 INJECTION, SOLUTION INTRAVENOUS
Status: DISCONTINUED | OUTPATIENT
Start: 2019-09-30 | End: 2019-09-30 | Stop reason: HOSPADM

## 2019-09-30 RX ORDER — LIDOCAINE HYDROCHLORIDE 20 MG/ML
INJECTION, SOLUTION INFILTRATION; PERINEURAL PRN
Status: DISCONTINUED | OUTPATIENT
Start: 2019-09-30 | End: 2019-09-30

## 2019-09-30 RX ADMIN — PROPOFOL 100 MG: 10 INJECTION, EMULSION INTRAVENOUS at 13:46

## 2019-09-30 RX ADMIN — LIDOCAINE HYDROCHLORIDE 60 MG: 20 INJECTION, SOLUTION INFILTRATION; PERINEURAL at 13:46

## 2019-09-30 RX ADMIN — PROPOFOL 150 MCG/KG/MIN: 10 INJECTION, EMULSION INTRAVENOUS at 13:46

## 2019-09-30 RX ADMIN — SODIUM CHLORIDE, POTASSIUM CHLORIDE, SODIUM LACTATE AND CALCIUM CHLORIDE: 600; 310; 30; 20 INJECTION, SOLUTION INTRAVENOUS at 13:24

## 2019-09-30 RX ADMIN — LIDOCAINE HYDROCHLORIDE 1 ML: 10 INJECTION, SOLUTION EPIDURAL; INFILTRATION; INTRACAUDAL; PERINEURAL at 13:24

## 2019-09-30 ASSESSMENT — LIFESTYLE VARIABLES: TOBACCO_USE: 0

## 2019-09-30 NOTE — DISCHARGE INSTRUCTIONS
[facility]    Home Care Following Endoscopy          Activity:    You have just undergone an endoscopic procedure performed with Monitored Anesthesia Cre.  Do not work or operate machinery (including a car) or drink alcohol (including beer) or sign legal documentsfor at least 12 hours.      I encourage you to walk and attempt to pass this air as soon as possible.    Diet:    Return to the diet you were on before your procedure but eat lightly for the first 12-24 hours.    Drink plenty of water.    Resume any regular medications unless otherwise advised by your physician.       You had NO polyp removed today.    Pain:    You may take Tylenol or Ibuprofen as needed for pain.  Expected Recovery:    You can expect some mild abdominal fullness and/or discomfort due to the air used to inflate your intestinal tract.      Call Your Physician if You Have:      After Colonoscopy:  o Worsening persisting abdominal pain which is worse with activity.  o Fevers (>101 degrees F), chills or shakes.  o Passage of continued blood with bowel movements.   Any questions or concerns about your recovery, please call [phone] or after hours [phone] [facility] Nurse Advice Line (24 hour line).    Follow-up Care:  You should follow up with your Primary provider as needed.

## 2019-09-30 NOTE — ANESTHESIA PREPROCEDURE EVALUATION
Anesthesia Pre-Procedure Evaluation    Patient: Jaskaran Car   MRN: [id number] : 1960          Preoperative Diagnosis: Screening    Procedure(s):  COLONOSCOPY    Past Medical History:   Diagnosis Date     Arthritis     psoriatic     Displacement of lumbar intervertebral disc without myelopathy 10/02    L4/5 disc herniation with L4 nerve root impingement bilaterally     External hemorrhoids      Leukopenia 2013     Leukopenia      Obstructive sleep apnea- CPAP at 5 cm water 2012     Right ankle pain 2006    MRI 2006 or so     Right knee pain summer 2007    MRI 2008     Thrombocytopaenia 2013     Thrombocytopenia (H)      Past Surgical History:   Procedure Laterality Date     ABDOMEN SURGERY       ARTHROSCOPY KNEE  2012    Procedure: ARTHROSCOPY KNEE;  Left knee arthroscopy, debridement and synovial biopsy;  Surgeon: Ivan Wiggins MD;  Location: [facility] OR     C STRESS TEST - REGULAR ([place])      negative stress thallium     CHOLECYSTECTOMY, LAPOROSCOPIC  2006     COLONOSCOPY  08    neg, recheck in 10 years     EYE SURGERY       [facility] UGI ENDOSCOPY DIAG W OR W/O BRUSH/WASH  06    normal     KNEE SURGERY         Anesthesia Evaluation     . Pt has had prior anesthetic. Type: General and MAC    No history of anesthetic complications          ROS/MED HX    ENT/Pulmonary:     (+)sleep apnea, uses CPAP 5 cmH2O , . .   (-) tobacco use   Neurologic:  - neg neurologic ROS     Cardiovascular:  - neg cardiovascular ROS       METS/Exercise Tolerance:     Hematologic:     (+) Other Hematologic Disorder-thrombocytopenia      Musculoskeletal:  - neg musculoskeletal ROS       GI/Hepatic:  - neg GI/hepatic ROS       Renal/Genitourinary:     (+) BPH,       Endo:  - neg endo ROS       Psychiatric:  - neg psychiatric ROS       Infectious Disease:  - neg infectious disease ROS       Malignancy:      - no malignancy   Other:    - neg other ROS                      Physical  "Exam  Normal systems: cardiovascular, pulmonary and dental    Airway   Mallampati: II  TM distance: >3 FB  Neck ROM: full    Dental     Cardiovascular   Rhythm and rate: regular and normal  (-) no murmur    Pulmonary    breath sounds clear to auscultation            Lab Results   Component Value Date    WBC 4.3 09/28/2018    HGB 15.0 09/28/2018    HCT 42.4 09/28/2018     (L) 09/28/2018    CRP <2.9 09/28/2018    SED 9 09/28/2018     08/09/2018    POTASSIUM 3.9 08/09/2018    CHLORIDE 103 08/09/2018    CO2 29 08/09/2018    BUN 22 08/09/2018    CR 1.05 09/28/2018    GLC 86 08/09/2018    MILTON 8.5 08/09/2018    ALBUMIN 3.9 05/11/2017    PROTTOTAL 7.1 05/11/2017    ALT 32 05/11/2017    AST 26 09/28/2018    ALKPHOS 61 05/11/2017    BILITOTAL 0.5 05/11/2017    TSH 0.99 05/11/2017       Preop Vitals  BP Readings from Last 3 Encounters:   09/06/19 104/80   08/09/18 110/62   03/18/18 138/86    Pulse Readings from Last 3 Encounters:   09/06/19 66   08/09/18 73   05/11/17 68      Resp Readings from Last 3 Encounters:   09/06/19 14   03/18/18 20   05/11/17 16    SpO2 Readings from Last 3 Encounters:   09/06/19 96%   08/09/18 95%   03/18/18 97%      Temp Readings from Last 1 Encounters:   09/06/19 98.3  F (36.8  C) (Oral)    Ht Readings from Last 1 Encounters:   09/06/19 1.715 m (5' 7.5\")      Wt Readings from Last 1 Encounters:   09/06/19 81.4 kg (179 lb 8 oz)    Estimated body mass index is 27.7 kg/m  as calculated from the following:    Height as of 9/6/19: 1.715 m (5' 7.5\").    Weight as of 9/6/19: 81.4 kg (179 lb 8 oz).       Anesthesia Plan      History & Physical Review  History and physical reviewed and following examination; no interval change.    ASA Status:  2 .    NPO Status:  > 6 hours    Plan for MAC with Propofol and Intravenous induction. Maintenance will be TIVA.  Reason for MAC:  Deep or markedly invasive procedure (G8)         Postoperative Care  Postoperative pain management:  Oral pain medications.  "     Consents  Anesthetic plan, risks, benefits and alternatives discussed with:  Patient.  Use of blood products discussed: No .   .                 TIFFANI Mendoza CRNA

## 2019-09-30 NOTE — RESULT ENCOUNTER NOTE
Jaskaran, your glucose is higher, but cholesterol improved and other labs look good.  Please let me know if you have any questions.    Becki Fernandez MD

## 2019-09-30 NOTE — ANESTHESIA POSTPROCEDURE EVALUATION
Patient: Jaskaran Car    Procedure(s):  COLONOSCOPY    Diagnosis:Screening  Diagnosis Additional Information: No value filed.    Anesthesia Type:  MAC    Note:  Anesthesia Post Evaluation    Patient location during evaluation: Phase 2 and Bedside  Patient participation: Able to fully participate in evaluation  Level of consciousness: awake and alert  Pain management: satisfactory to patient  Airway patency: patent  Cardiovascular status: stable  Respiratory status: spontaneous ventilation and room air  Hydration status: stable  PONV: none     Anesthetic complications: None    Comments: Appear to tolerate MAC with IV sedation well without anesthesia related problems / complications noted.  Pain level satisfactory per patient. No N  /  V.  No complaints per patient.  Will follow as needed.        Last vitals:  Vitals:    09/30/19 1322   BP: 125/83   Resp: 16   Temp: 97.9  F (36.6  C)   SpO2: 95%         Electronically Signed By: TIFFANI Mendoza CRNA  September 30, 2019  2:12 PM

## 2019-09-30 NOTE — H&P
"Pre-Endoscopy History and Physical     Jaskaran Car MRN# [id number]   YOB: 1960 Age: 59 year old     Date of Procedure: 9/30/2019  Primary care provider: Becki Fernandez  Type of Endoscopy: colonoscopy  Type of Anesthesia Anticipated: MAC     HPI:    Jaskaran is a 59 year old male who will be undergoing screening or surveillance colonoscopy.    Jaskaran is feeling well today. Can get up a single flight of stairs without dyspnea. Estimated METS > 4.     Patient Active Problem List   Diagnosis     Displacement of lumbar intervertebral disc without myelopathy     Cholecystitis     Benign prostatic hyperplasia with urinary frequency     Advanced directives, counseling/discussion     Patellar tendinitis     Knee pain     Obstructive sleep apnea- CPAP at 5 cm water     Pain in joint, ankle and foot     Inverse psoriasis     High risk medications (not anticoagulants) long-term use     Psoriatic arthritis (H)     High risk medication use     Thrombocytopenia (H)     Hyperlipidemia with target LDL less than 130     Impaired fasting glucose     Acute pharyngitis          REVIEW OF SYSTEMS:     5 point ROS negative except as noted above in HPI, including Gen., Resp., CV, GI &  system review.      PHYSICAL EXAM:   /83   Temp 97.9  F (36.6  C) (Oral)   Resp 16   SpO2 95%    Estimated body mass index is 27.7 kg/m  as calculated from the following:    Height as of 9/6/19: 1.715 m (5' 7.5\").    Weight as of 9/6/19: 81.4 kg (179 lb 8 oz).    GENERAL APPEARANCE: alert and no distress  RESP: lungs clear and unlabored  CV: regular  ABD: soft, nt/nd    DIAGNOSTICS:    Not indicated      IMPRESSION   ASA Class 2 - Mild systemic disease        PLAN:     Plan for colonoscopy. No medical contraindications to proceed, or further work up needed. The risks and benefits of the procedure and the sedation options and risks were discussed with the patient. These include infection, bleeding, and small risk of colon " perforation (1/1000 to 1/55381 depending on patient characteristics and type of procedure). Jaskaran was also explained to alternatives for colo-rectal screening. All questions were answered and informed consent was obtained.      The above has been forwarded to the consulting provider.      Signed Electronically by: Patrick Garcia MD  September 30, 2019

## 2019-10-04 NOTE — PROGRESS NOTES
SUBJECTIVE:   CC: Jaskaran Car is an 59 year old male who presents for preventative health visit.     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     Thrombocytopenia   Slightly low platelet count and is stable and monitoring, last labs even a bit better than before.     Psoriatic Arthritis   [facility] [place] and is taking Prednisone and Humira with rheumatologist.     Obstructive Sleep Apnea   Using CPAP machine regularly which he believes is greater than 5 years old, he says that the heater is no longer working with this machine. His sleep study was in 2011.  AHI score was >15.     Today's PHQ-2 Score:   PHQ-2 ( 1999 Pfizer) 10/8/2019   Q1: Little interest or pleasure in doing things 0   Q2: Feeling down, depressed or hopeless 0   PHQ-2 Score 0   Q1: Little interest or pleasure in doing things Not at all   Q2: Feeling down, depressed or hopeless Not at all   PHQ-2 Score 0     Abuse: Current or Past(Physical, Sexual or Emotional)- No  Do you feel safe in your environment? Yes    Social History     Tobacco Use     Smoking status: Never Smoker     Smokeless tobacco: Never Used     Tobacco comment: no smokers in the household   Substance Use Topics     Alcohol use: Yes     Comment: 3 to 4 drinks a week     Alcohol Use 10/8/2019   Prescreen: >3 drinks/day or >7 drinks/week? No   Prescreen: >3 drinks/day or >7 drinks/week? -     Last PSA:   PSA   Date Value Ref Range Status   09/30/2019 0.35 0 -  4 ug/L Final     Comment:     Assay Method:  Chemiluminescence using Siemens Vista analyzer       Reviewed orders with patient. Reviewed health maintenance and updated orders accordingly - Yes  BP Readings from Last 3 Encounters:   10/09/19 110/70   09/30/19 103/79   09/06/19 104/80    Wt Readings from Last 3 Encounters:   10/09/19 80.3 kg (177 lb)   09/06/19 81.4 kg (179 lb 8 oz)   08/09/18 82.6 kg (182 lb)         Reviewed and updated as needed this visit by clinical staff  Tobacco  Allergies  Meds  Med Hx  Surg Hx  Fam Hx  Soc Hx        Reviewed and updated as needed this visit by Provider        Past Medical History:   Diagnosis Date     Arthritis     psoriatic     Displacement of lumbar intervertebral disc without myelopathy 10/02    L4/5 disc herniation with L4 nerve root impingement bilaterally     External hemorrhoids      Leukopenia 5/1/2013     Leukopenia      Obstructive sleep apnea- CPAP at 5 cm water 2/23/2012     Right ankle pain June 2006    MRI Nov 2006 or so     Right knee pain summer 2007    MRI Nov 2008     Thrombocytopaenia 6/12/2013     Thrombocytopenia (H)       Past Surgical History:   Procedure Laterality Date     ABDOMEN SURGERY       ARTHROSCOPY KNEE  8/30/2012    Procedure: ARTHROSCOPY KNEE;  Left knee arthroscopy, debridement and synovial biopsy;  Surgeon: Ivan Wiggins MD;  Location: [facility]     C STRESS TEST - REGULAR (FL)  2006    negative stress thallium     CHOLECYSTECTOMY, LAPOROSCOPIC  12/21/2006     COLONOSCOPY  09/30/08    neg, recheck in 10 years     COLONOSCOPY N/A 9/30/2019    Procedure: COLONOSCOPY;  Surgeon: Patrick Garcia MD;  Location:  GI     EYE SURGERY        UGI ENDOSCOPY DIAG W OR W/O BRUSH/WASH  12/11/06    normal     KNEE SURGERY         Review of Systems   Constitutional: Negative for chills and fever.   HENT: Negative for congestion, ear pain, hearing loss and sore throat.    Eyes: Negative for pain and visual disturbance.  "  Respiratory: Negative for cough and shortness of breath.    Cardiovascular: Negative for chest pain, palpitations and peripheral edema.   Gastrointestinal: Negative for abdominal pain, constipation, diarrhea, heartburn, hematochezia and nausea.   Genitourinary: Positive for frequency and urgency. Negative for discharge, dysuria, genital sores, hematuria and impotence.   Musculoskeletal: Positive for myalgias. Negative for arthralgias and joint swelling.   Skin: Negative for rash.   Neurological: Negative for dizziness, weakness, headaches and paresthesias.   Psychiatric/Behavioral: Negative for mood changes. The patient is not nervous/anxious.      This document serves as a record of the services and decisions personally performed and made by Becki Fernandez MD . It was created on her behalf by Richard Pope, a trained medical scribe. The creation of this document is based on the provider's statements to the medical scribe.  Richard Pope 3:54 PM October 9, 2019    OBJECTIVE:   /70   Pulse 64   Temp 98.3  F (36.8  C) (Temporal)   Resp 14   Ht 1.685 m (5' 6.34\")   Wt 80.3 kg (177 lb)   SpO2 97%   BMI 28.28 kg/m      Physical Exam  GENERAL: healthy, alert and no distress  EYES: Eyes grossly normal to inspection, PERRL and conjunctivae and sclerae normal  HENT: ear canals and TM's normal, nose and mouth without ulcers or lesions  NECK: no adenopathy, no asymmetry, masses, or scars and thyroid normal to palpation  RESP: lungs clear to auscultation - no rales, rhonchi or wheezes  CV: regular rate and rhythm, normal S1 S2, no S3 or S4, no murmur, click or rub, no peripheral edema and peripheral pulses strong  ABDOMEN: soft, nontender, no hepatosplenomegaly, no masses and bowel sounds normal  MS: no gross musculoskeletal defects noted, no edema  SKIN: he has a small scrape on the lower left leg which is not worrisome, skin tag with left lower eyelid  PSYCH: mentation appears normal, affect " "normal/bright    ASSESSMENT/PLAN:       ICD-10-CM    1. Routine general medical examination at a health care facility Z00.00    2. Obstructive sleep apnea- CPAP at 5 cm water G47.33 CPAP     OFFICE/OUTPT VISIT,EST,LEVL III   3. Need for Tdap vaccination Z23 TDAP VACCINE   4. Impaired fasting glucose R73.01 OFFICE/OUTPT VISIT,EST,LEVL III   5. Hyperlipidemia with target LDL less than 130 E78.5 OFFICE/OUTPT VISIT,EST,LEVL III   6. Thrombocytopenia (H) D69.6 OFFICE/OUTPT VISIT,EST,LEVL III   7. Psoriatic arthritis (H) L40.50 OFFICE/OUTPT VISIT,EST,LEVL III   8. 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 Skin tag L91.8      Reviewed recent lab work and was all pretty normal besides borderline controlled cholesterol numbers and low platelets which were actually a chronic issue, but improved since last labs. Advised avoiding large intakes of fats/carbs at one time to keep control on cholesterol. Obstructive Sleep Apnea is controlled with CPAP machine which he uses regularly, but machine has some issue and is old. Provider order for new CPAP machine. Following with [facility] in [place] for Psoriatic Arthritis. Skin tag on left lower eyelid is bothersome and can be removed but may not be covered by insurance since it appears to be cosmetic.     COUNSELING:   Reviewed preventive health counseling, as reflected in patient instructions  Special attention given to:        Regular exercise       Healthy diet/nutrition       Immunizations    Vaccinated for: TDAP and Zoster (recommended)    Estimated body mass index is 28.28 kg/m  as calculated from the following:    Height as of this encounter: 1.685 m (5' 6.34\").    Weight as of this encounter: 80.3 kg (177 lb).     Weight management plan: Discussed healthy diet and exercise guidelines doing crossfit training      reports that he has never smoked. He has never used smokeless tobacco.      Counseling Resources:  ATP IV " Guidelines  Pooled Cohorts Equation Calculator  FRAX Risk Assessment  ICSI Preventive Guidelines  Dietary Guidelines for Americans, 2010  USDA's MyPlate  ASA Prophylaxis  Lung CA Screening    The information in this document, created by the medical scribe for me, accurately reflects the services I personally performed and the decisions made by me. I have reviewed and approved this document for accuracy prior to leaving the patient care area.  October 9, 2019 4:11 PM    Becki Fernandez MD, MD  [facility]

## 2019-10-04 NOTE — PATIENT INSTRUCTIONS
1) We ordered a new CPAP machine and supplies.     2) Avoid too many fats or carbs at one time. Cholesterol numbers were borderline controlled. Other labs look to be normal as of recently.     3) Today we discussed and advised the new shingles vaccine (ShingRx) which you can get at any pharmacy. You will then follow-up 2-6 months later for a second booster.     4) For the skin tag this can be taken care of but generally this is not covered by insurance.     Preventive Health Recommendations  Male Ages 50 - 64    Yearly exam:             See your health care provider every year in order to  o   Review health changes.   o   Discuss preventive care.    o   Review your medicines if your doctor has prescribed any.     Have a cholesterol test every 5 years, or more frequently if you are at risk for high cholesterol/heart disease.     Have a diabetes test (fasting glucose) every three years. If you are at risk for diabetes, you should have this test more often.     Have a colonoscopy at age 50, or have a yearly FIT test (stool test). These exams will check for colon cancer.      Talk with your health care provider about whether or not a prostate cancer screening test (PSA) is right for you.    You should be tested each year for STDs (sexually transmitted diseases), if you re at risk.     Shots: Get a flu shot each year. Get a tetanus shot every 10 years.     Nutrition:    Eat at least 5 servings of fruits and vegetables daily.     Eat whole-grain bread, whole-wheat pasta and brown rice instead of white grains and rice.     Get adequate Calcium and Vitamin D.     Lifestyle    Exercise for at least 150 minutes a week (30 minutes a day, 5 days a week). This will help you control your weight and prevent disease.     Limit alcohol to one drink per day.     No smoking.     Wear sunscreen to prevent skin cancer.     See your dentist every six months for an exam and cleaning.     See your eye doctor every 1 to 2 years.

## 2019-10-08 ENCOUNTER — TRANSFERRED RECORDS (OUTPATIENT)
Dept: HEALTH INFORMATION MANAGEMENT | Facility: CLINIC | Age: 59
End: 2019-10-08

## 2019-10-08 ASSESSMENT — ENCOUNTER SYMPTOMS
NAUSEA: 0
SHORTNESS OF BREATH: 0
ARTHRALGIAS: 0
CHILLS: 0
PARESTHESIAS: 0
NERVOUS/ANXIOUS: 0
FREQUENCY: 1
WEAKNESS: 0
CONSTIPATION: 0
PALPITATIONS: 0
HEMATURIA: 0
HEMATOCHEZIA: 0
ABDOMINAL PAIN: 0
SORE THROAT: 0
EYE PAIN: 0
DIZZINESS: 0
HEARTBURN: 0
COUGH: 0
DIARRHEA: 0
DYSURIA: 0
HEADACHES: 0
FEVER: 0
MYALGIAS: 1
JOINT SWELLING: 0

## 2019-10-09 ENCOUNTER — OFFICE VISIT (OUTPATIENT)
Dept: FAMILY MEDICINE | Facility: OTHER | Age: 59
End: 2019-10-09
Payer: COMMERCIAL

## 2019-10-09 VITALS
DIASTOLIC BLOOD PRESSURE: 70 MMHG | RESPIRATION RATE: 14 BRPM | OXYGEN SATURATION: 97 % | HEIGHT: 66 IN | HEART RATE: 64 BPM | WEIGHT: 177 LBS | TEMPERATURE: 98.3 F | BODY MASS INDEX: 28.45 KG/M2 | SYSTOLIC BLOOD PRESSURE: 110 MMHG

## 2019-10-09 DIAGNOSIS — E78.5 HYPERLIPIDEMIA WITH TARGET LDL LESS THAN 130: ICD-10-CM

## 2019-10-09 DIAGNOSIS — D69.6 THROMBOCYTOPENIA (H): ICD-10-CM

## 2019-10-09 DIAGNOSIS — G47.33 OBSTRUCTIVE SLEEP APNEA: ICD-10-CM

## 2019-10-09 DIAGNOSIS — R73.01 IMPAIRED FASTING GLUCOSE: ICD-10-CM

## 2019-10-09 DIAGNOSIS — Z23 NEED FOR TDAP VACCINATION: ICD-10-CM

## 2019-10-09 DIAGNOSIS — Z00.00 ROUTINE GENERAL MEDICAL EXAMINATION AT A HEALTH CARE FACILITY: Primary | ICD-10-CM

## 2019-10-09 DIAGNOSIS — L91.8 SKIN TAG: ICD-10-CM

## 2019-10-09 DIAGNOSIS — L40.8 INVERSE PSORIASIS: ICD-10-CM

## 2019-10-09 DIAGNOSIS — L40.50 PSORIATIC ARTHRITIS (H): ICD-10-CM

## 2019-10-09 PROCEDURE — 99213 OFFICE O/P EST LOW 20 MIN: CPT | Mod: 25 | Performed by: FAMILY MEDICINE

## 2019-10-09 PROCEDURE — 90715 TDAP VACCINE 7 YRS/> IM: CPT | Performed by: FAMILY MEDICINE

## 2019-10-09 PROCEDURE — 99396 PREV VISIT EST AGE 40-64: CPT | Mod: 25 | Performed by: FAMILY MEDICINE

## 2019-10-09 PROCEDURE — 90471 IMMUNIZATION ADMIN: CPT | Performed by: FAMILY MEDICINE

## 2019-10-09 ASSESSMENT — ENCOUNTER SYMPTOMS
NAUSEA: 0
SHORTNESS OF BREATH: 0
DIARRHEA: 0
FREQUENCY: 1
WEAKNESS: 0
HEMATOCHEZIA: 0
ARTHRALGIAS: 0
HEARTBURN: 0
SORE THROAT: 0
CHILLS: 0
COUGH: 0
FEVER: 0
MYALGIAS: 1
ABDOMINAL PAIN: 0
DYSURIA: 0
HEMATURIA: 0
JOINT SWELLING: 0
PARESTHESIAS: 0
NERVOUS/ANXIOUS: 0
HEADACHES: 0
EYE PAIN: 0
DIZZINESS: 0
PALPITATIONS: 0
CONSTIPATION: 0

## 2019-10-09 ASSESSMENT — MIFFLIN-ST. JEOR: SCORE: 1561

## 2019-10-09 NOTE — NURSING NOTE
Prior to immunization administration, verified patients identity using patient s name and date of birth. Please see Immunization Activity for additional information.     Screening Questionnaire for Adult Immunization    Are you sick today?   No   Do you have allergies to medications, food, a vaccine component or latex?   No   Have you ever had a serious reaction after receiving a vaccination?   No   Do you have a long-term health problem with heart disease, lung disease, asthma, kidney disease, metabolic disease (e.g. diabetes), anemia, or other blood disorder?   No   Do you have cancer, leukemia, HIV/AIDS, or any other immune system problem?   No   In the past 3 months, have you taken medications that affect  your immune system, such as prednisone, other steroids, or anticancer drugs; drugs for the treatment of rheumatoid arthritis, Crohn s disease, or psoriasis; or have you had radiation treatments?   Yes   Have you had a seizure, or a brain or other nervous system problem?   No   During the past year, have you received a transfusion of blood or blood     products, or been given immune (gamma) globulin or antiviral drug?   No   For women: Are you pregnant or is there a chance you could become        pregnant during the next month?   No   Have you received any vaccinations in the past 4 weeks?   No     Immunization questionnaire was positive for at least one answer.  AE aware.        Per orders of Dr. Fernandez, injection of Tdap given by Izabella Snow CMA. Patient instructed to remain in clinic for 15 minutes afterwards, and to report any adverse reaction to me immediately.       Screening performed by Izabella Snow CMA on 10/9/2019 at 4:35 PM.

## 2019-10-24 ENCOUNTER — DOCUMENTATION ONLY (OUTPATIENT)
Dept: SLEEP MEDICINE | Facility: CLINIC | Age: 59
End: 2019-10-24
Payer: COMMERCIAL

## 2019-10-24 NOTE — PROGRESS NOTES
Patient was offered choice of vendor and chose [facility].  Patient Jaskaran Car was set up at [place] on October 24, 2019. Patient received a Resmed AirSense 10 Auto. Pressures were set at 5-10 cm H2O.   Patient s ramp is 5 cm H2O for Off and FLEX/EPR is EPR.  Patient received a Respironics Mask name: nuance gel pro  Pillow mask size Medium, heated tubing and heated humidifier.  Patient is not enrolled in the STM Program and does not need to meet compliance. Patient has a follow up on 12/5/19 with Dr. Metcalf.    Janelle Abdalla

## 2019-10-26 ENCOUNTER — HEALTH MAINTENANCE LETTER (OUTPATIENT)
Age: 59
End: 2019-10-26

## 2019-10-28 ENCOUNTER — DOCUMENTATION ONLY (OUTPATIENT)
Dept: SLEEP MEDICINE | Facility: CLINIC | Age: 59
End: 2019-10-28
Payer: COMMERCIAL

## 2019-10-28 NOTE — PROGRESS NOTES
3 DAY STM VISIT    Diagnostic AHI: Unable find to AHI    Patient contacted for 3 day STM visit.  Subjective measures:  Patient a replacement machine and stated things are going good.     Replacement device: Yes  STM ordered by provider: Yes     Device type: Auto-CPAP  PAP settings from order::  CPAP min 5 cm  H20       CPAP max 10 cm  H20  Mask type:    Nasal Mask     Device settings from machine      Min CPAP 5.0            Max CPAP 10.0      Assessment: Nightly usage over four hours.  Action plan: Patient removed from STM, STM not required or ordered. . Patient has a follow up visit scheduled:   yes within 31-90 days of set up.    Total time spent on remote patient monitoring data analysis and patient contact today:   14 minutes

## 2019-12-05 ENCOUNTER — OFFICE VISIT (OUTPATIENT)
Dept: SLEEP MEDICINE | Facility: CLINIC | Age: 59
End: 2019-12-05
Payer: COMMERCIAL

## 2019-12-05 VITALS
DIASTOLIC BLOOD PRESSURE: 74 MMHG | BODY MASS INDEX: 28.45 KG/M2 | HEART RATE: 60 BPM | WEIGHT: 177 LBS | OXYGEN SATURATION: 97 % | SYSTOLIC BLOOD PRESSURE: 119 MMHG | HEIGHT: 66 IN

## 2019-12-05 DIAGNOSIS — G47.33 OSA (OBSTRUCTIVE SLEEP APNEA): Primary | ICD-10-CM

## 2019-12-05 PROCEDURE — 99201 ZZC OFFICE/OUTPT VISIT, NEW, LEVEL I: CPT | Performed by: OTOLARYNGOLOGY

## 2019-12-05 ASSESSMENT — MIFFLIN-ST. JEOR: SCORE: 1555.62

## 2019-12-05 NOTE — PROGRESS NOTES
Obstructive Sleep Apnea - PAP Follow-Up Visit:    Chief Complaint   Patient presents with     CPAP Follow Up       Jaskaran Car comes in today for follow-up of their moderate sleep apnea, managed with CPAP.     No specialty comments available.    Overall, he rates the experience with PAP as 9 (0 poor, 10 great). The mask is comfortable.    The mask is not leaking .  He is not snoring with the mask on. He is not having gasp arousals.  He is not having significant oral/nasal dryness. The pressure is comfortable.     His PAP interface is Nasal Pillows.     The patient is usually getting 5hours of sleep per night.    He does feel rested in the morning.  And even more rested if he gets more sleep.    No data recorded      No data recorded    ResMed       Auto-PAP 5.0 - 10.0 cmH2O 30 day usage data:    73% of days with > 4 hours of use. 3/30 days with no use.   Average use 295 minutes per day.   95%ile Leak 12.43 L/min.   CPAP 95% pressure 9.1 cm.   AHI 3.04 events per hour.         Past medical/surgical history, family history, social history, medications and allergies were reviewed.      Problem List:  Patient Active Problem List    Diagnosis Date Noted     High risk medication use 06/12/2013     Priority: High     Thrombocytopenia (H) 06/12/2013     Priority: High     Diagnosis updated by automated process. Provider to review and confirm.       Psoriatic arthritis (H) 12/21/2012     Priority: High     Acute pharyngitis 08/12/2015     Priority: Medium     Impaired fasting glucose 07/13/2015     Priority: Medium     Hyperlipidemia with target LDL less than 130 07/08/2013     Priority: Medium     Diagnosis updated by automated process. Provider to review and confirm.       High risk medications (not anticoagulants) long-term use 12/21/2012     Priority: Medium     Inverse psoriasis 06/26/2012     Priority: Medium     Pain in joint, ankle and foot 04/18/2012     Priority: Medium     Obstructive sleep apnea- CPAP at 5 cm  water 02/23/2012     Priority: Medium     Patellar tendinitis 09/20/2011     Priority: Medium     Knee pain 09/20/2011     Priority: Medium     Advanced directives, counseling/discussion 06/09/2011     Priority: Medium     Health care directive mailed to patient. Recommended that he makes a copy for his medical record.        Benign prostatic hyperplasia with urinary frequency 03/17/2008     Priority: Medium     Cholecystitis 11/28/2006     Priority: Medium     Problem list name updated by automated process. Provider to review       Displacement of lumbar intervertebral disc without myelopathy 12/23/2002     Priority: Medium     L4/5          There were no vitals taken for this visit.    Impression/Plan:  The patient with moderate OMKAR doing well with CPAP. The only issue is sleep hygiene  Since he has to be more disciplined going to sleep on time. He denies insomnia.   Moderate Sleep apnea.  Tolerating PAP well. Daytime symptoms are improved.       Jaskaran Car will follow up in about 1 year(s).     Fifteen minutes spent with patient, all of which were spent face-to-face counseling, consulting, coordinating plan of care.      CC:  Becki Fernandez Oleg Froymovich, MD

## 2020-02-05 ENCOUNTER — TRANSFERRED RECORDS (OUTPATIENT)
Dept: HEALTH INFORMATION MANAGEMENT | Facility: CLINIC | Age: 60
End: 2020-02-05

## 2020-02-19 ENCOUNTER — PRE VISIT (OUTPATIENT)
Dept: ORTHOPEDICS | Facility: CLINIC | Age: 60
End: 2020-02-19

## 2020-02-19 DIAGNOSIS — M25.511 RIGHT SHOULDER PAIN: Primary | ICD-10-CM

## 2020-02-19 NOTE — TELEPHONE ENCOUNTER
Right shoulder pain, MR 2/5/20 (PACS), seen by chiropractor prior. Will need XR per protocol. Called and left VM to come 30 min early.    Costa Holley RN

## 2020-02-20 ENCOUNTER — OFFICE VISIT (OUTPATIENT)
Dept: ORTHOPEDICS | Facility: CLINIC | Age: 60
End: 2020-02-20
Payer: COMMERCIAL

## 2020-02-20 ENCOUNTER — ANCILLARY PROCEDURE (OUTPATIENT)
Dept: GENERAL RADIOLOGY | Facility: CLINIC | Age: 60
End: 2020-02-20
Attending: ORTHOPAEDIC SURGERY
Payer: COMMERCIAL

## 2020-02-20 VITALS
DIASTOLIC BLOOD PRESSURE: 82 MMHG | BODY MASS INDEX: 28.96 KG/M2 | WEIGHT: 184.5 LBS | HEIGHT: 67 IN | SYSTOLIC BLOOD PRESSURE: 129 MMHG | HEART RATE: 65 BPM | OXYGEN SATURATION: 96 %

## 2020-02-20 DIAGNOSIS — M25.511 RIGHT SHOULDER PAIN: ICD-10-CM

## 2020-02-20 DIAGNOSIS — M67.919 DISORDER OF BURSAE AND TENDONS IN SHOULDER REGION: Primary | ICD-10-CM

## 2020-02-20 DIAGNOSIS — M71.9 DISORDER OF BURSAE AND TENDONS IN SHOULDER REGION: Primary | ICD-10-CM

## 2020-02-20 PROCEDURE — 99203 OFFICE O/P NEW LOW 30 MIN: CPT | Performed by: ORTHOPAEDIC SURGERY

## 2020-02-20 PROCEDURE — 73030 X-RAY EXAM OF SHOULDER: CPT | Mod: RT | Performed by: RADIOLOGY

## 2020-02-20 ASSESSMENT — MIFFLIN-ST. JEOR: SCORE: 1597.58

## 2020-02-20 ASSESSMENT — PAIN SCALES - GENERAL: PAINLEVEL: NO PAIN (1)

## 2020-02-20 NOTE — NURSING NOTE
"Jaskaran Car's chief complaint for this visit includes:  Chief Complaint   Patient presents with     Right Shoulder - Pain     PCP: Becki Fernandez    Referring Provider:  No referring provider defined for this encounter.    /82 (BP Location: Left arm, Patient Position: Sitting, Cuff Size: Adult Regular)   Pulse 65   Ht 1.689 m (5' 6.5\")   Wt 83.7 kg (184 lb 8 oz)   SpO2 96%   BMI 29.33 kg/m    No Pain (1)     Do you need any medication refills at today's visit? No    Reshma Moreira CMA        "

## 2020-02-20 NOTE — PROGRESS NOTES
CHIEF CONCERN:  Right Shoulder Pain    HISTORY OF PRESENT ILLNESS:  Ramy is a pleasant 59 year old right hand dominant man who presents for evaluation of the above concern.  His right shoulder started bothering him intermittently in October 2019.  In the last 2-3 weeks it has become more constant.  He has pain with raising his right arm higher than 90 degrees.  He can use the right arm as long as he keeps the arm low and close to his body.  He has been seeing a chiropractor at [facility] in [place] who ordered an MRI that he had completed at [facility] in [place].  His chiropractor then recommended he see a surgeon to evaluate for a cuff tear. Pain today is a 1 on a scale of 1-10 at rest.    Past Medical History:   Diagnosis Date     Arthritis     psoriatic     Displacement of lumbar intervertebral disc without myelopathy 10/02    L4/5 disc herniation with L4 nerve root impingement bilaterally     External hemorrhoids      Leukopenia 5/1/2013     Leukopenia      Obstructive sleep apnea- CPAP at 5 cm water 2/23/2012     Right ankle pain June 2006    MRI Nov 2006 or so     Right knee pain summer 2007    MRI Nov 2008     Thrombocytopaenia 6/12/2013     Thrombocytopenia (H)        Past Surgical History:   Procedure Laterality Date     ABDOMEN SURGERY       ARTHROSCOPY KNEE  8/30/2012    Procedure: ARTHROSCOPY KNEE;  Left knee arthroscopy, debridement and synovial biopsy;  Surgeon: Ivan Wiggins MD;  Location: [facility] OR     C STRESS TEST - REGULAR (FL)  2006    negative stress thallium     CHOLECYSTECTOMY, LAPOROSCOPIC  12/21/2006     COLONOSCOPY  09/30/08    neg, recheck in 10 years     COLONOSCOPY N/A 9/30/2019    Procedure: COLONOSCOPY;  Surgeon: Patrick Garcia MD;  Location:  GI     EYE SURGERY        UGI ENDOSCOPY DIAG W OR W/O BRUSH/WASH  12/11/06    normal     KNEE SURGERY         Current Outpatient Medications   Medication Sig Dispense Refill     Cholecalciferol (VITAMIN D3 PO) Take 5,000  Units by mouth daily       HUMIRA PEN 40 MG/0.8ML pen kit   0     MAGNESIUM PO        Omega-3 Fatty Acids (FISH OIL) 1000 MG CPDR Take  by mouth daily.       predniSONE (DELTASONE) 5 MG tablet   0     Zinc 15 MG CAPS Take 1 tablet by mouth daily 30 capsule      order for DME Equipment ordered: RESMED Auto PAP Mask type: Nasal Settings: 5-10cm H2O            Allergies   Allergen Reactions     Oxycontin [Oxycodone Hydrochloride-Polysorbate 80] Itching       SOCIAL HISTORY:    Social History     Socioeconomic History     Marital status:      Spouse name: Reshma     Number of children: 2     Years of education: Not on file     Highest education level: Not on file   Occupational History     Occupation: maintance     Comment: [employer]     Employer: [employer]   Social Needs     Financial resource strain: Not on file     Food insecurity:     Worry: Not on file     Inability: Not on file     Transportation needs:     Medical: Not on file     Non-medical: Not on file   Tobacco Use     Smoking status: Never Smoker     Smokeless tobacco: Never Used     Tobacco comment: no smokers in the household   Substance and Sexual Activity     Alcohol use: Yes     Comment: 3 to 4 drinks a week     Drug use: No     Sexual activity: Not Currently     Partners: Female     Birth control/protection: Surgical     Comment: tubal ligation   Lifestyle     Physical activity:     Days per week: Not on file     Minutes per session: Not on file     Stress: Not on file   Relationships     Social connections:     Talks on phone: Not on file     Gets together: Not on file     Attends Hindu service: Not on file     Active member of club or organization: Not on file     Attends meetings of clubs or organizations: Not on file     Relationship status: Not on file     Intimate partner violence:     Fear of current or ex partner: Not on file     Emotionally abused: Not on file     Physically abused: Not on file     Forced sexual activity: Not  on file   Other Topics Concern      Service No     Blood Transfusions No     Caffeine Concern No     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     Exercise Yes     Comment: 2 times a week: yoga and hockey, stretching     Bike Helmet Yes     Seat Belt Yes     Self-Exams No     Parent/sibling w/ CABG, MI or angioplasty before 65F 55M? No   Social History Narrative     Not on file       FAMILY HISTORY: Reviewed in EMR      REVIEW OF SYSTEMS: Positive for that noted in past medical history and history of present illness and otherwise reviewed in EMR     PHYSICAL EXAM:    Adult male in no acute distress. Articulates and communicates with normal affect.  Respirations even and unlabored  Focused upper extremity exam: Skin intact. No erythema. Sensation intact all dermatomes into the hand to light touch. EPL, FPL, and Intrinsics intact. Right shoulder active motion is FE to 180, ER at side to 80, and IR to T10. Left shoulder active motion is FE to 175, ER to 75, and IR to L1.  Negative Neer and Wren. No pain on palpation over the AC joint. Focal pain on palpation over the long head of the biceps. Positive Speeds and markedly positive OBriens. Negative empty can and belly press.     IMAGING:  Right shoulder XR demonstrates no GH or AC OA    Right shoulder MRI 2/5/2020 was reviewed and I agree with the impression below:      ASSESSMENT:    1. Right long head biceps tenosynovitis and SLAP tear   2. Right partial thickness intrasubstance subscap tear  3. Right shoulder GH chondral loss    PLAN:  I discussed the imaging and exam findings. We discussed risks and benefits of treatment options. We discussed beginning formal shoulder PT. If symptoms are not improving in 4-6 weeks patient to  update us on his progress. We may discuss surgical options if nonoperative treatment does not adequately relief his symptoms.     Madonna Kerns MD

## 2020-02-20 NOTE — LETTER
2/20/2020         RE: Jaskaran Car  [address] [facility] [address]        Dear Colleague,    Thank you for referring your patient, Jaskaran Car, to the [facility]. Please see a copy of my visit note below.    CHIEF CONCERN:  Right Shoulder Pain    HISTORY OF PRESENT ILLNESS:  Ramy is a pleasant 59 year old right hand dominant man who presents for evaluation of the above concern.  His right shoulder started bothering him intermittently in October 2019.  In the last 2-3 weeks it has become more constant.  He has pain with raising his right arm higher than 90 degrees.  He can use the right arm as long as he keeps the arm low and close to his body.  He has been seeing a chiropractor at [facility] in [place] who ordered an MRI that he had completed at [facility] in [place].  His chiropractor then recommended he see a surgeon to evaluate for a cuff tear. Pain today is a 1 on a scale of 1-10 at rest.    Past Medical History:   Diagnosis Date     Arthritis     psoriatic     Displacement of lumbar intervertebral disc without myelopathy 10/02    L4/5 disc herniation with L4 nerve root impingement bilaterally     External hemorrhoids      Leukopenia 5/1/2013     Leukopenia      Obstructive sleep apnea- CPAP at 5 cm water 2/23/2012     Right ankle pain June 2006    MRI Nov 2006 or so     Right knee pain summer 2007    MRI Nov 2008     Thrombocytopaenia 6/12/2013     Thrombocytopenia (H)        Past Surgical History:   Procedure Laterality Date     ABDOMEN SURGERY       ARTHROSCOPY KNEE  8/30/2012    Procedure: ARTHROSCOPY KNEE;  Left knee arthroscopy, debridement and synovial biopsy;  Surgeon: Ivan Wiggins MD;  Location: [facility] OR     C STRESS TEST - REGULAR (FL)  2006    negative stress thallium     CHOLECYSTECTOMY, LAPOROSCOPIC  12/21/2006     COLONOSCOPY  09/30/08    neg, recheck in 10 years     COLONOSCOPY N/A 9/30/2019    Procedure: COLONOSCOPY;  Surgeon: Patrick Garcia  MD Efrain;  Location:  GI     EYE SURGERY        UGI ENDOSCOPY DIAG W OR W/O BRUSH/WASH  12/11/06    normal     KNEE SURGERY         Current Outpatient Medications   Medication Sig Dispense Refill     Cholecalciferol (VITAMIN D3 PO) Take 5,000 Units by mouth daily       HUMIRA PEN 40 MG/0.8ML pen kit   0     MAGNESIUM PO        Omega-3 Fatty Acids (FISH OIL) 1000 MG CPDR Take  by mouth daily.       predniSONE (DELTASONE) 5 MG tablet   0     Zinc 15 MG CAPS Take 1 tablet by mouth daily 30 capsule      order for DME Equipment ordered: RESMED Auto PAP Mask type: Nasal Settings: 5-10cm H2O            Allergies   Allergen Reactions     Oxycontin [Oxycodone Hydrochloride-Polysorbate 80] Itching       SOCIAL HISTORY:    Social History     Socioeconomic History     Marital status:      Spouse name: Reshma     Number of children: 2     Years of education: Not on file     Highest education level: Not on file   Occupational History     Occupation: maintance     Comment: [employer]     Employer: [employer]   Social Needs     Financial resource strain: Not on file     Food insecurity:     Worry: Not on file     Inability: Not on file     Transportation needs:     Medical: Not on file     Non-medical: Not on file   Tobacco Use     Smoking status: Never Smoker     Smokeless tobacco: Never Used     Tobacco comment: no smokers in the household   Substance and Sexual Activity     Alcohol use: Yes     Comment: 3 to 4 drinks a week     Drug use: No     Sexual activity: Not Currently     Partners: Female     Birth control/protection: Surgical     Comment: tubal ligation   Lifestyle     Physical activity:     Days per week: Not on file     Minutes per session: Not on file     Stress: Not on file   Relationships     Social connections:     Talks on phone: Not on file     Gets together: Not on file     Attends Taoism service: Not on file     Active member of club or organization: Not on file     Attends meetings of clubs  or organizations: Not on file     Relationship status: Not on file     Intimate partner violence:     Fear of current or ex partner: Not on file     Emotionally abused: Not on file     Physically abused: Not on file     Forced sexual activity: Not on file   Other Topics Concern      Service No     Blood Transfusions No     Caffeine Concern No     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     Exercise Yes     Comment: 2 times a week: yoga and hockey, stretching     Bike Helmet Yes     Seat Belt Yes     Self-Exams No     Parent/sibling w/ CABG, MI or angioplasty before 65F 55M? No   Social History Narrative     Not on file       FAMILY HISTORY: Reviewed in EMR      REVIEW OF SYSTEMS: Positive for that noted in past medical history and history of present illness and otherwise reviewed in EMR     PHYSICAL EXAM:    Adult male in no acute distress. Articulates and communicates with normal affect.  Respirations even and unlabored  Focused upper extremity exam: Skin intact. No erythema. Sensation intact all dermatomes into the hand to light touch. EPL, FPL, and Intrinsics intact. Right shoulder active motion is FE to 180, ER at side to 80, and IR to T10. Left shoulder active motion is FE to 175, ER to 75, and IR to L1.  Negative Neer and Wren. No pain on palpation over the AC joint. Focal pain on palpation over the long head of the biceps. Positive Speeds and markedly positive OBriens. Negative empty can and belly press.     IMAGING:  Right shoulder XR demonstrates no GH or AC OA    Right shoulder MRI 2/5/2020 was reviewed and I agree with the impression below:      ASSESSMENT:    1. Right long head biceps tenosynovitis and SLAP tear   2. Right partial thickness intrasubstance subscap  tear  3. Right shoulder GH chondral loss    PLAN:  I discussed the imaging and exam findings. We discussed risks and benefits of treatment options. We discussed beginning formal shoulder PT. If symptoms are not improving in 4-6 weeks patient to update us on his progress. We may discuss surgical options if nonoperative treatment does not adequately relief his symptoms.     Madonna Kerns MD      Again, thank you for allowing me to participate in the care of your patient.        Sincerely,        Madonna Kerns MD

## 2020-03-04 ENCOUNTER — THERAPY VISIT (OUTPATIENT)
Dept: PHYSICAL THERAPY | Facility: CLINIC | Age: 60
End: 2020-03-04
Payer: COMMERCIAL

## 2020-03-04 DIAGNOSIS — M71.9 DISORDER OF BURSAE AND TENDONS IN SHOULDER REGION: ICD-10-CM

## 2020-03-04 DIAGNOSIS — G89.29 CHRONIC RIGHT SHOULDER PAIN: ICD-10-CM

## 2020-03-04 DIAGNOSIS — M25.511 CHRONIC RIGHT SHOULDER PAIN: ICD-10-CM

## 2020-03-04 DIAGNOSIS — M67.919 DISORDER OF BURSAE AND TENDONS IN SHOULDER REGION: ICD-10-CM

## 2020-03-04 PROCEDURE — 97110 THERAPEUTIC EXERCISES: CPT | Mod: GP | Performed by: PHYSICAL THERAPIST

## 2020-03-04 PROCEDURE — 97161 PT EVAL LOW COMPLEX 20 MIN: CPT | Mod: GP | Performed by: PHYSICAL THERAPIST

## 2020-03-04 NOTE — LETTER
[facility] PH[facility]ICAL THERAPY  [address]  [facility] [address] [phone]    2020    Re: Jaskaran Car   :   1960  MRN:  [id number]   REFERRING PHYSICIAN:   Madonna Kerns    [facility] PHYSICAL [facility]    Date of Initial Evaluation:  ***  Visits:  Rxs Used: 1  Reason for Referral:     Disorder of bursae and tendons in shoulder region  Chronic right shoulder pain    EVALUATION SUMMARY    [facility] Initial Evaluation  Subjective:  The history is provided by the patient. No  was used.   Patient Health History  Jaskaran Car being seen for PT for frayed bicep tendon.     Problem began: 10/22/2019.   Problem occurred: ?   Pain is reported as 3/10 on pain scale.  General health as reported by patient is good.  Pertinent medical history includes: pain at night/rest and sleep disorder/apnea.     Medical allergies: other. Other medical allergies details: Oxycontin.   Surgeries include:  Orthopedic surgery.    Current medications:  Anti-inflammatory and steroids.    Current occupation is .   Primary job tasks include:  Computer work, lifting/carrying and repetitive tasks.                  Therapist Generated HPI Evaluation         Type of problem:  Right shoulder.    This is a chronic (10/19/19) condition.  Condition occurred with:  Unknown cause.  Where condition occurred: for unknown reasons.  Patient reports pain:  Anterior.  Pain is described as aching and sharp and is intermittent.  Pain radiates to:  Cervical and upper arm. Pain is the same all the time.  Since onset symptoms are gradually improving.  Associated symptoms:  Loss of motion/stiffness and painful arc. Exacerbated by: reaching out, turning volume on the radio, pushing / push up/ pull ups,   and relieved by rest (not going to cross fit right now).  Special tests included:  MRI and x-ray  (tynosynovitis of long head of bicep, high grade subscapularis tear).  Previous treatment includes chiropractic. There was moderate improvement following previous treatment.  Restrictions due to condition include:  Working in normal job without restrictions.  Barriers include:  None as reported by patient.                        Objective:  System                   Shoulder Evaluation:  ROM:  AROM:    Flexion:  Left:  160    Right:  160 with pain at 150 deg,   Extension: Left: 70Right: 70  Abduction:  Left: 155   Right:  140 deg pain starting at 90 deg.       External Rotation:  Left:  88    Right:  80            Extension/Internal Rotation:  Left:  T10    Right:  S1    PROM:            Internal Rotation:  Left:  55    Right:  33 deg  External Rotation:  Left:  110    Right:  90 with end range pain                    Strength:    Flexion: Left:5/5 Strong/pain free    Pain:    Right: 5/5  Strong/pain free     Pain:   Extension:  Left: 5/5  Strong/pain free    Pain:    Right: 5/5    Strong/pain free  Pain:  Abduction:  Left: 5/5  Strong/pain free  Pain:    Right: 4/5   Weak/painful    Pain:  Adduction:  Left: 5/5   Strong/pain free   Pain:    Right: 5/5   Strong/pain free    Pain:  Internal Rotation:  Left:5/5   Strong/pain free    Pain:    Right: 4/5   Weak/painful    Pain:  External Rotation:   Left:5/5   Strong/pain free    Pain:   Right:5/5   Strong/pain free    Pain:        Elbow Flexion:  Left:5/5   Strong/pain free    Pain:    Right:5/5   Strong/pain free    Pain:    Stability Testing:      Right shoulder stability positive testing:Apprehension; Relocation; Internal Rotation and External Rotation                                         General     ROS    Assessment/Plan:    Patient is a 59 year old male with right side shoulder complaints.    Patient has the following significant findings with corresponding treatment plan.                Diagnosis 1:  Chronic right shoulder pain  Pain -  hot/cold therapy, US,  manual therapy, self management, education, directional preference exercise and home program  Decreased ROM/flexibility - manual therapy, therapeutic exercise, therapeutic activity and home program  Decreased joint mobility - manual therapy, therapeutic exercise, therapeutic activity and home program  Decreased strength - therapeutic exercise, therapeutic activities and home program  Decreased function - therapeutic activities and home program  Instability -  Therapeutic Activity  Therapeutic Exercise  Neuromuscular Re-education  home program    Therapy Evaluation Codes:   1) History comprised of:   Personal factors that impact the plan of care:      Time since onset of symptoms.    Comorbidity factors that impact the plan of care are:      Sleep disorder/apnea.     Medications impacting care: Anti-inflammatory.  2) Examination of Body Systems comprised of:   Body structures and functions that impact the plan of care:      Shoulder.   Activity limitations that impact the plan of care are:      Lifting, Working and Sleeping.  3) Clinical presentation characteristics are:   Stable/Uncomplicated.  4) Decision-Making    Low complexity using standardized patient assessment instrument and/or measureable assessment of functional outcome.  Cumulative Therapy Evaluation is: Low complexity.    Previous and current functional limitations:  (See Goal Flow Sheet for this information)    Short term and Long term goals: (See Goal Flow Sheet for this information)     Communication ability:  Patient appears to be able to clearly communicate and understand verbal and written communication and follow directions correctly.  Treatment Explanation - The following has been discussed with the patient:   RX ordered/plan of care  Anticipated outcomes  Possible risks and side effects  This patient would benefit from PT intervention to resume normal activities.   Rehab potential is good.    Frequency:  1 X week, once daily  Duration:  for 6  weeks  Discharge Plan:  Achieve all LTG.  Independent in home treatment program.  Reach maximal therapeutic benefit.    Please refer to the daily flowsheet for treatment today, total treatment time and time spent performing 1:1 timed codes.         Thank you for your referral.    INQUIRIES  Therapist:   [facility]  [address]  [facility] [address]  Phone: [phone]  Fax: [phone]

## 2020-03-04 NOTE — PROGRESS NOTES
[facility] Initial Evaluation  Subjective:  The history is provided by the patient. No  was used.   Patient Health History  Jaskaran Car being seen for PT for frayed bicep tendon.     Problem began: 10/22/2019.   Problem occurred: ?   Pain is reported as 3/10 on pain scale.  General health as reported by patient is good.  Pertinent medical history includes: pain at night/rest and sleep disorder/apnea.     Medical allergies: other. Other medical allergies details: Oxycontin.   Surgeries include:  Orthopedic surgery.    Current medications:  Anti-inflammatory and steroids.    Current occupation is .   Primary job tasks include:  Computer work, lifting/carrying and repetitive tasks.                  Therapist Generated HPI Evaluation         Type of problem:  Right shoulder.    This is a chronic (10/19/19) condition.  Condition occurred with:  Unknown cause.  Where condition occurred: for unknown reasons.  Patient reports pain:  Anterior.  Pain is described as aching and sharp and is intermittent.  Pain radiates to:  Cervical and upper arm. Pain is the same all the time.  Since onset symptoms are gradually improving.  Associated symptoms:  Loss of motion/stiffness and painful arc. Exacerbated by: reaching out, turning volume on the radio, pushing / push up/ pull ups,   and relieved by rest (not going to cross fit right now).  Special tests included:  MRI and x-ray (tynosynovitis of long head of bicep, high grade subscapularis tear).  Previous treatment includes chiropractic. There was moderate improvement following previous treatment.  Restrictions due to condition include:  Working in normal job without restrictions.  Barriers include:  None as reported by patient.                        Objective:  System                   Shoulder Evaluation:  ROM:  AROM:    Flexion:  Left:  160    Right:  160 with pain at 150 deg,   Extension: Left: 70Right: 70  Abduction:   Left: 155   Right:  140 deg pain starting at 90 deg.       External Rotation:  Left:  88    Right:  80            Extension/Internal Rotation:  Left:  T10    Right:  S1    PROM:            Internal Rotation:  Left:  55    Right:  33 deg  External Rotation:  Left:  110    Right:  90 with end range pain                    Strength:    Flexion: Left:5/5 Strong/pain free    Pain:    Right: 5/5  Strong/pain free     Pain:   Extension:  Left: 5/5  Strong/pain free    Pain:    Right: 5/5    Strong/pain free  Pain:  Abduction:  Left: 5/5  Strong/pain free  Pain:    Right: 4/5   Weak/painful    Pain:  Adduction:  Left: 5/5   Strong/pain free   Pain:    Right: 5/5   Strong/pain free    Pain:  Internal Rotation:  Left:5/5   Strong/pain free    Pain:    Right: 4/5   Weak/painful    Pain:  External Rotation:   Left:5/5   Strong/pain free    Pain:   Right:5/5   Strong/pain free    Pain:        Elbow Flexion:  Left:5/5   Strong/pain free    Pain:    Right:5/5   Strong/pain free    Pain:    Stability Testing:      Right shoulder stability positive testing:Apprehension; Relocation; Internal Rotation and External Rotation                                         General     ROS    Assessment/Plan:    Patient is a 59 year old male with right side shoulder complaints.    Patient has the following significant findings with corresponding treatment plan.                Diagnosis 1:  Chronic right shoulder pain  Pain -  hot/cold therapy, US, manual therapy, self management, education, directional preference exercise and home program  Decreased ROM/flexibility - manual therapy, therapeutic exercise, therapeutic activity and home program  Decreased joint mobility - manual therapy, therapeutic exercise, therapeutic activity and home program  Decreased strength - therapeutic exercise, therapeutic activities and home program  Decreased function - therapeutic activities and home program  Instability -  Therapeutic Activity  Therapeutic  Exercise  Neuromuscular Re-education  home program    Therapy Evaluation Codes:   1) History comprised of:   Personal factors that impact the plan of care:      Time since onset of symptoms.    Comorbidity factors that impact the plan of care are:      Sleep disorder/apnea.     Medications impacting care: Anti-inflammatory.  2) Examination of Body Systems comprised of:   Body structures and functions that impact the plan of care:      Shoulder.   Activity limitations that impact the plan of care are:      Lifting, Working and Sleeping.  3) Clinical presentation characteristics are:   Stable/Uncomplicated.  4) Decision-Making    Low complexity using standardized patient assessment instrument and/or measureable assessment of functional outcome.  Cumulative Therapy Evaluation is: Low complexity.    Previous and current functional limitations:  (See Goal Flow Sheet for this information)    Short term and Long term goals: (See Goal Flow Sheet for this information)     Communication ability:  Patient appears to be able to clearly communicate and understand verbal and written communication and follow directions correctly.  Treatment Explanation - The following has been discussed with the patient:   RX ordered/plan of care  Anticipated outcomes  Possible risks and side effects  This patient would benefit from PT intervention to resume normal activities.   Rehab potential is good.    Frequency:  1 X week, once daily  Duration:  for 6 weeks  Discharge Plan:  Achieve all LTG.  Independent in home treatment program.  Reach maximal therapeutic benefit.    Please refer to the daily flowsheet for treatment today, total treatment time and time spent performing 1:1 timed codes.

## 2020-03-11 ENCOUNTER — THERAPY VISIT (OUTPATIENT)
Dept: PHYSICAL THERAPY | Facility: CLINIC | Age: 60
End: 2020-03-11
Payer: COMMERCIAL

## 2020-03-11 DIAGNOSIS — M25.511 CHRONIC RIGHT SHOULDER PAIN: ICD-10-CM

## 2020-03-11 DIAGNOSIS — G89.29 CHRONIC RIGHT SHOULDER PAIN: ICD-10-CM

## 2020-03-11 PROCEDURE — 97110 THERAPEUTIC EXERCISES: CPT | Mod: GP | Performed by: PHYSICAL THERAPIST

## 2020-03-11 PROCEDURE — 97112 NEUROMUSCULAR REEDUCATION: CPT | Mod: GP | Performed by: PHYSICAL THERAPIST

## 2020-03-18 ENCOUNTER — THERAPY VISIT (OUTPATIENT)
Dept: PHYSICAL THERAPY | Facility: CLINIC | Age: 60
End: 2020-03-18
Payer: COMMERCIAL

## 2020-03-18 DIAGNOSIS — G89.29 CHRONIC RIGHT SHOULDER PAIN: ICD-10-CM

## 2020-03-18 DIAGNOSIS — M25.511 CHRONIC RIGHT SHOULDER PAIN: ICD-10-CM

## 2020-03-18 PROCEDURE — 97110 THERAPEUTIC EXERCISES: CPT | Mod: GP | Performed by: PHYSICAL THERAPIST

## 2020-03-24 RX ORDER — PREDNISONE 5 MG/1
TABLET ORAL
Refills: 0 | OUTPATIENT
Start: 2020-03-24

## 2020-04-03 ENCOUNTER — TELEPHONE (OUTPATIENT)
Dept: PHYSICAL THERAPY | Facility: CLINIC | Age: 60
End: 2020-04-03

## 2020-04-03 DIAGNOSIS — G89.29 CHRONIC RIGHT SHOULDER PAIN: ICD-10-CM

## 2020-04-03 DIAGNOSIS — M25.511 CHRONIC RIGHT SHOULDER PAIN: ICD-10-CM

## 2020-04-03 NOTE — TELEPHONE ENCOUNTER
Spoke with patient and he states that this was his 3rd call about his PT appt's. He would like to wait on doing anything for now, see how it goes. If he feels he wants a visit, he has the number to call to set that up.

## 2020-07-21 PROBLEM — G89.29 CHRONIC RIGHT SHOULDER PAIN: Status: RESOLVED | Noted: 2020-03-04 | Resolved: 2020-07-21

## 2020-07-21 PROBLEM — M25.511 CHRONIC RIGHT SHOULDER PAIN: Status: RESOLVED | Noted: 2020-03-04 | Resolved: 2020-07-21

## 2020-07-21 NOTE — PROGRESS NOTES
Discharge Note    Progress reporting period is from initial evaluation date (please see noted date below) to Mar 18, 2020.  Linked Episodes   Type: Episode: Status: Noted: Resolved: Last update: Updated by:   PHYSICAL THERAPY right shoulder pain 3/4/20 Active 3/4/2020  4/3/2020  2:07 PM Matty Muñoz, PT      Comments:       Jaskaran failed to follow up and current status is unknown.  Please see information below for last relevant information on current status.  Patient seen for 3 visits.    SUBJECTIVE  Subjective changes noted by patient:  Patient reports since using weights his arm is sore again. Did tile work and reaching out in front of him aggravated as well.   .  Current pain level is 1/10.     Previous pain level was  8/10.   Changes in function:  Yes (See Goal flowsheet attached for changes in current functional level)  Adverse reaction to treatment or activity: None    OBJECTIVE  Changes noted in objective findings: flexion 160 deg end range pain, abd 172 deg no pain, ER WNL end range pain. ext/IR T10 bilaterally. abd 4/5 with pain flex 5/5, ER      ASSESSMENT/PLAN  Diagnosis: right shoulder pain   Updated problem list and treatment plan:   Pain - HEP  Decreased function - HEP  Decreased strength - HEP  STG/LTGs have been met or progress has been made towards goals:  Yes, please see goal flowsheet for most current information  Assessment of Progress: current status is unknown.    Last current status: Pt is progressing as expected   Self Management Plans:  HEP  I have re-evaluated this patient and find that the nature, scope, duration and intensity of the therapy is appropriate for the medical condition of the patient.  Jaskaran continues to require the following intervention to meet STG and LTG's:  HEP.    Recommendations:  Discharge with current home program.  Patient to follow up with MD as needed.    Please refer to the daily flowsheet for treatment today, total treatment time and time spent performing  1:1 timed codes.

## 2020-08-20 ENCOUNTER — VIRTUAL VISIT (OUTPATIENT)
Dept: FAMILY MEDICINE | Facility: OTHER | Age: 60
End: 2020-08-20

## 2020-08-20 NOTE — PROGRESS NOTES
"Date: 2020 06:22:23  Clinician: Namrata Neff  Clinician NPI: [id number]  Patient: Jaskaran Car  Patient : 1960  Patient Address: [address] [place] [id number]  Patient Phone: [phone]  Visit Protocol: Shingles  Patient Summary:  Jaskaran is a 60 year old ( : 1960 ) male who initiated a Visit for suspected Herpes zoster (shingles). When asked the question \"Please sign me up to receive news, health information and promotions. \", Jaskaran responded \"No\".    Images of his skin condition were uploaded.   His symptoms started 4-6 days ago. The left side of his body is affected. The rash is located on his back. The rash is red and includes crusts, dry skin, and blisters.   The affected area feels like it burns, painful, itchy, and tender to touch. The symptoms do not interfere with his sleep. Jaskaran experienced pain or unusual sensations in the location of the rash before it appeared.   Symptom details     Blisters: Jaskaran has several blisters.    Pain: The pain is moderate (between 4-6 on a 10 point pain scale).     Denied symptoms include numbness, warm to the touch, drainage, scabs, flaky skin, scaly skin, and sores. Jaskaran does not feel feverish.   Pertinent medical history  Jaskaran has had chickenpox in the past. It is not known if Jaskaran has had shingles in the past. He   has not received a shingles vaccine.   Jaskaran has ongoing medical conditions. Ongoing medical conditions as reported by the patient (free text): Psoriatic Arthritis   Jaskaran does not need a return to work/school note.   Jaskaran does not smoke or use smokeless tobacco.   Additional information as reported by the patient (free text): Started feeling the burning, pain on  but thought I may have pulled or hurt abdominal muscle. Pain increased on Monday which for me is a normal time for muscles to be sore after a workout. Noticed that the pain seemed to be moving to other areas of torso on left " "side on Tuesday and into the back area on Wednesday. Noticed the rash on Wednesday evening. I do have pics of rash if needed.     MEDICATIONS: prednisone oral, Humira(CF) Pen subcutaneous, ALLERGIES: OxyContin  Clinician Response:  Yajaira Jessica,   Herpes Zoster (shingles)&nbsp;   Shingles is a painful rash that is usually shaped like a band (picture 1). It can affect people of all ages, but it is most common in those older than 50. Another name for shingles is \"herpes zoster.\"   Shingles is caused by a virus called varicella-zoster virus. This is the same virus that causes chickenpox. After someone has chickenpox, the virus sometimes hides out, \"asleep\" in the body. Years later, it can \"wake up\" and cause shingles. The first time a person is infected with that virus, he or she gets chickenpox, not shingles.  Are Shingles contagious? In a way, yes. It is not possible to \"catch\" shingles from someone who has the rash. But if you have never had chickenpox or gotten the chickenpox vaccine, it is possible to \"catch\" the virus and then get sick with chickenpox. Shingles and chickenpox are caused by the same virus.   What are the symptoms of shingles?  Within 3 to 4 days, shingles blisters can become open sores or \"ulcers.\" These ulcers can sometimes get infected. Within 7 to 10 days, the rash should scab over and start to heal.    Diagnosis: Zoster with other complications  Diagnosis ICD: B02.8  Prescription: valacyclovir (Valtrex) 1 gram oral tablet 21 tablet, 7 days supply. Take 1 tablet by mouth every 8 hours for 7 days. Refills: 0, Refill as needed: no, Allow substitutions: yes  Pharmacy: [facility] - [phone] - [address] [place] [address]  "

## 2020-11-02 ENCOUNTER — MYC MEDICAL ADVICE (OUTPATIENT)
Dept: FAMILY MEDICINE | Facility: OTHER | Age: 60
End: 2020-11-02

## 2020-11-02 DIAGNOSIS — G47.33 OSA (OBSTRUCTIVE SLEEP APNEA): Primary | ICD-10-CM

## 2020-11-02 NOTE — TELEPHONE ENCOUNTER
I haven't seen in a year, so would need appt and dis due for preventative and labs. Though has seen Dr. Haskins within last year if he wanted to order, he would potentially be able to delay our visit based on preferences.  Becki Fernandez MD

## 2020-11-03 NOTE — TELEPHONE ENCOUNTER
Notified patient that new DME order has been placed and instructed him to call [facility] to order his new supplies.     Garcia ANDREWS CMA

## 2020-11-13 ASSESSMENT — ENCOUNTER SYMPTOMS
PARESTHESIAS: 0
SORE THROAT: 0
HEADACHES: 0
PALPITATIONS: 0
MYALGIAS: 0
DYSURIA: 0
HEARTBURN: 0
NAUSEA: 0
FEVER: 0
WEAKNESS: 0
NERVOUS/ANXIOUS: 0
SHORTNESS OF BREATH: 0
HEMATOCHEZIA: 0
DIZZINESS: 0
FREQUENCY: 1
DIARRHEA: 1
HEMATURIA: 0
CHILLS: 0
EYE PAIN: 0
ARTHRALGIAS: 0
ABDOMINAL PAIN: 1
CONSTIPATION: 0
COUGH: 0
JOINT SWELLING: 0

## 2020-11-13 NOTE — PROGRESS NOTES
SUBJECTIVE:   CC: Jaskaran Car is an 60 year old male who presents for preventative health visit.     Patient has been advised of split billing requirements and indicates understanding: Yes  Healthy Habits:     Getting at least 3 servings of Calcium per day:  NO    Bi-annual eye exam:  Yes    Dental care twice a year:  Yes    Sleep apnea or symptoms of sleep apnea:  Excessive snoring and Sleep apnea    Diet:  Regular (no restrictions)    Frequency of exercise:  2-3 days/week    Duration of exercise:  45-60 minutes    Taking medications regularly:  Yes    Medication side effects:  None    PHQ-2 Total Score: 0    Additional concerns today:  Yes      Medication Followup of Humira     Taking Medication as prescribed: yes    Side Effects:  None    Medication Helping Symptoms:  yes       Today's PHQ-2 Score:   PHQ-2 ( 1999 Pfizer) 11/13/2020   Q1: Little interest or pleasure in doing things 0   Q2: Feeling down, depressed or hopeless 0   PHQ-2 Score 0   Q1: Little interest or pleasure in doing things Not at all   Q2: Feeling down, depressed or hopeless Not at all   PHQ-2 Score 0       Abuse: Current or Past(Physical, Sexual or Emotional)- No  Do you feel safe in your environment? Yes      Social History     Tobacco Use     Smoking status: Never Smoker     Smokeless tobacco: Never Used     Tobacco comment: no smokers in the household   Substance Use Topics     Alcohol use: Yes     Comment: 3 to 4 drinks a week       Alcohol Use 11/13/2020   Prescreen: >3 drinks/day or >7 drinks/week? No   Prescreen: >3 drinks/day or >7 drinks/week? -       Last PSA:   PSA   Date Value Ref Range Status   09/30/2019 0.35 0 - 4 ug/L Final     Comment:     Assay Method:  Chemiluminescence using Siemens Vista analyzer       Reviewed orders with patient. Reviewed health maintenance and updated orders accordingly - Yes      Reviewed and updated as needed this visit by clinical staff  Tobacco  Allergies  Meds  Problems  Med Hx  Surg Hx   "Fam Hx  Soc Hx          Reviewed and updated as needed this visit by Provider  Tobacco  Allergies  Meds  Problems  Med Hx  Surg Hx  Fam Hx             Review of Systems  CONSTITUTIONAL: NEGATIVE for fever, chills, change in weight  INTEGUMENTARY/SKIN: NEGATIVE for worrisome rashes, moles or lesions  EYES: NEGATIVE for vision changes or irritation  ENT: NEGATIVE for ear, mouth and throat problems  RESP: NEGATIVE for significant cough or SOB  CV: NEGATIVE for chest pain, palpitations or peripheral edema  GI: NEGATIVE for nausea, abdominal pain, heartburn, or change in bowel habits   male: negative for dysuria, hematuria, decreased urinary stream, erectile dysfunction, urethral discharge  MUSCULOSKELETAL: NEGATIVE for significant arthralgias or myalgia  NEURO: NEGATIVE for weakness, dizziness or paresthesias  PSYCHIATRIC: NEGATIVE for changes in mood or affect    OBJECTIVE:   /76   Pulse 72   Temp 97.5  F (36.4  C) (Temporal)   Resp 14   Ht 1.689 m (5' 6.5\")   Wt 82.8 kg (182 lb 8 oz)   SpO2 100%   BMI 29.01 kg/m      Physical Exam  GENERAL: healthy, alert and no distress  EYES: Eyes grossly normal to inspection, PERRL and conjunctivae and sclerae normal  HENT: ear canals and TM's normal, nose and mouth without ulcers or lesions  NECK: no adenopathy, no asymmetry, masses, or scars and thyroid normal to palpation  RESP: lungs clear to auscultation - no rales, rhonchi or wheezes  CV: regular rate and rhythm, normal S1 S2, no S3 or S4, no murmur, click or rub, no peripheral edema and peripheral pulses strong  ABDOMEN: soft, nontender, no hepatosplenomegaly, no masses and bowel sounds normal  MS: no gross musculoskeletal defects noted, no edema  SKIN: no suspicious lesions or rashes  NEURO: Normal strength and tone, mentation intact and speech normal  PSYCH: mentation appears normal, affect normal/bright        ASSESSMENT/PLAN:       ICD-10-CM    1. Routine general medical examination at a [facility] " "care facility  Z00.00    2. Hyperlipidemia with target LDL less than 130  E78.5    3. Impaired fasting glucose  R73.01    4. Obstructive sleep apnea- CPAP at 5 cm water  G47.33    5. Psoriatic arthritis (H)  L40.50    6. Thrombocytopenia (H)  D69.6    7. Benign prostatic hyperplasia with urinary frequency  N40.1     R35.0    8. Screening for hyperlipidemia  Z13.220    9. Benign prostatic hyperplasia with incomplete bladder emptying  N40.1 oxybutynin ER (DITROPAN-XL) 10 MG 24 hr tablet    R39.14      Approved for boy  camp. Does labs through work and will bring these in. Unclear if they do PSA and has AUA BPH score 14. Could consider, though done last year and low end of normal, so not urgent. May offer oxybutynin for BPH symptoms and he is interested. He is ok with skipping PSA if not included in his work profile, though if he is interested in the future, can order then.  Also discussed shingrix and will check with insurance on coverage, had shingles this summer - would still be a good candidate.  Uses CPAP regularly, even has solar panels for powering when camping. Also works with rheumatologist for his RA and humira.    Patient has been advised of split billing requirements and indicates understanding: Yes  COUNSELING:   Reviewed preventive health counseling, as reflected in patient instructions       Regular exercise       Healthy diet/nutrition    Estimated body mass index is 29.01 kg/m  as calculated from the following:    Height as of this encounter: 1.689 m (5' 6.5\").    Weight as of this encounter: 82.8 kg (182 lb 8 oz).     Weight management plan: Discussed healthy diet and exercise guidelines    He reports that he has never smoked. He has never used smokeless tobacco.      Counseling Resources:  ATP IV Guidelines  Pooled Cohorts Equation Calculator  FRAX Risk Assessment  ICSI Preventive Guidelines  Dietary Guidelines for Americans, 2010  USDA's MyPlate  ASA Prophylaxis  Lung CA Screening    Becki Fernandez, " MD [place]  [facility]

## 2020-11-13 NOTE — PATIENT INSTRUCTIONS
Preventive Health Recommendations  Male Ages 50 - 64    Yearly exam:             See your health care provider every year in order to  o   Review health changes.   o   Discuss preventive care.    o   Review your medicines if your doctor has prescribed any.     Have a cholesterol test every 5 years, or more frequently if you are at risk for high cholesterol/heart disease.     Have a diabetes test (fasting glucose) every three years. If you are at risk for diabetes, you should have this test more often.     Have a colonoscopy at age 50, or have a yearly FIT test (stool test). These exams will check for colon cancer.      Talk with your health care provider about whether or not a prostate cancer screening test (PSA) is right for you.    You should be tested each year for STDs (sexually transmitted diseases), if you re at risk.     Shots: Get a flu shot each year. Get a tetanus shot every 10 years.     Nutrition:    Eat at least 5 servings of fruits and vegetables daily.     Eat whole-grain bread, whole-wheat pasta and brown rice instead of white grains and rice.     Get adequate Calcium and Vitamin D.     Lifestyle    Exercise for at least 150 minutes a week (30 minutes a day, 5 days a week). This will help you control your weight and prevent disease.     Limit alcohol to one drink per day.     No smoking.     Wear sunscreen to prevent skin cancer.     See your dentist every six months for an exam and cleaning.     See your eye doctor every 1 to 2 years.    shingrix vaccine

## 2020-11-16 ENCOUNTER — OFFICE VISIT (OUTPATIENT)
Dept: FAMILY MEDICINE | Facility: OTHER | Age: 60
End: 2020-11-16
Payer: COMMERCIAL

## 2020-11-16 VITALS
OXYGEN SATURATION: 100 % | SYSTOLIC BLOOD PRESSURE: 124 MMHG | DIASTOLIC BLOOD PRESSURE: 76 MMHG | WEIGHT: 182.5 LBS | TEMPERATURE: 97.5 F | HEART RATE: 72 BPM | BODY MASS INDEX: 28.64 KG/M2 | RESPIRATION RATE: 14 BRPM | HEIGHT: 67 IN

## 2020-11-16 DIAGNOSIS — N40.1 BENIGN PROSTATIC HYPERPLASIA WITH URINARY FREQUENCY: ICD-10-CM

## 2020-11-16 DIAGNOSIS — E78.5 HYPERLIPIDEMIA WITH TARGET LDL LESS THAN 130: ICD-10-CM

## 2020-11-16 DIAGNOSIS — G47.33 OBSTRUCTIVE SLEEP APNEA: ICD-10-CM

## 2020-11-16 DIAGNOSIS — R35.0 BENIGN PROSTATIC HYPERPLASIA WITH URINARY FREQUENCY: ICD-10-CM

## 2020-11-16 DIAGNOSIS — Z13.220 SCREENING FOR HYPERLIPIDEMIA: ICD-10-CM

## 2020-11-16 DIAGNOSIS — Z00.00 ROUTINE GENERAL MEDICAL EXAMINATION AT A HEALTH CARE FACILITY: Primary | ICD-10-CM

## 2020-11-16 DIAGNOSIS — R39.14 BENIGN PROSTATIC HYPERPLASIA WITH INCOMPLETE BLADDER EMPTYING: ICD-10-CM

## 2020-11-16 DIAGNOSIS — N40.1 BENIGN PROSTATIC HYPERPLASIA WITH INCOMPLETE BLADDER EMPTYING: ICD-10-CM

## 2020-11-16 DIAGNOSIS — L40.50 PSORIATIC ARTHRITIS (H): ICD-10-CM

## 2020-11-16 DIAGNOSIS — R73.01 IMPAIRED FASTING GLUCOSE: ICD-10-CM

## 2020-11-16 DIAGNOSIS — D69.6 THROMBOCYTOPENIA (H): ICD-10-CM

## 2020-11-16 PROCEDURE — 99396 PREV VISIT EST AGE 40-64: CPT | Performed by: FAMILY MEDICINE

## 2020-11-16 PROCEDURE — 99213 OFFICE O/P EST LOW 20 MIN: CPT | Mod: 25 | Performed by: FAMILY MEDICINE

## 2020-11-16 RX ORDER — OXYBUTYNIN CHLORIDE 10 MG/1
10 TABLET, EXTENDED RELEASE ORAL DAILY
Qty: 30 TABLET | Refills: 1 | Status: SHIPPED | OUTPATIENT
Start: 2020-11-16 | End: 2021-02-11

## 2020-11-16 ASSESSMENT — MIFFLIN-ST. JEOR: SCORE: 1583.5

## 2020-11-16 ASSESSMENT — PAIN SCALES - GENERAL: PAINLEVEL: NO PAIN (0)

## 2020-11-17 ENCOUNTER — MYC MEDICAL ADVICE (OUTPATIENT)
Dept: FAMILY MEDICINE | Facility: OTHER | Age: 60
End: 2020-11-17

## 2021-02-09 ENCOUNTER — TELEPHONE (OUTPATIENT)
Dept: FAMILY MEDICINE | Facility: OTHER | Age: 61
End: 2021-02-09

## 2021-02-09 ENCOUNTER — MYC MEDICAL ADVICE (OUTPATIENT)
Dept: FAMILY MEDICINE | Facility: OTHER | Age: 61
End: 2021-02-09

## 2021-02-09 DIAGNOSIS — Z23 NEED FOR SHINGLES VACCINE: Primary | ICD-10-CM

## 2021-02-09 NOTE — TELEPHONE ENCOUNTER
Patient would like to get the Shingrix vaccination.      Since he is on Humira (immunosuppressant) we can not give him the vaccination under our pharmacy protocol .     Will need a new rx.    We sent a faxed message to his Rhuematologist but did not hear back.    Per patient, he talked to the rheumatologist and was told to get the rx from his primary provider.     If ok for him to get this vaccine please send a new rx to our pharmacy for the 1st dose plus a refill for the 2nd dose.    Thank you   -Erica Marroquin, Pharm.D., [facility], [phone]

## 2021-02-09 NOTE — TELEPHONE ENCOUNTER
As stated in the note, I had directed him to complete evisit about oxybutynin before refills.  Please direct to hortensia Fernandez MD

## 2021-02-10 RX ORDER — ZOSTER VACCINE RECOMBINANT, ADJUVANTED 50 MCG/0.5
1 KIT INTRAMUSCULAR ONCE
Qty: 0.5 ML | Refills: 1 | Status: SHIPPED | OUTPATIENT
Start: 2021-02-10 | End: 2021-02-11

## 2021-02-10 NOTE — PROGRESS NOTES
Ramy is a 60 year old who is being evaluated via a billable telephone visit.      What phone number would you like to be contacted at? [phone]  How would you like to obtain your AVS? MyChart    Assessment & Plan       ICD-10-CM    1. Benign prostatic hyperplasia with incomplete bladder emptying  N40.1 oxybutynin ER (DITROPAN-XL) 10 MG 24 hr tablet    R39.14 Prostate spec antigen screen   2. Screening for hyperlipidemia  Z13.220    3. Hyperlipidemia with target LDL less than 130  E78.5 Lipid panel reflex to direct LDL Fasting     CANCELED: TSH with free T4 reflex   4. Impaired fasting glucose  R73.01 BASIC METABOLIC PANEL   5. Thrombocytopenia (H)  D69.6 CBC with platelets   6. Family history of factor V Leiden mutation  Z83.2 Factor 5 leiden mutation analysis      Discussed genetic testing risks and benefits and he is considering testing with the family history. As virtual visit, we could not sign consent form today, but he plans to sign when he comes in for labs tomorrow and then will need to leave for me to sign  Otherwise he is due for annual labs and will do the PSA as he has had BPH like symptoms and need to rule out issues with prostate cancer. Then will renew the oxybutynin for control.    26 minutes spent on the date of the encounter doing chart review, history and exam, documentation and further activities as noted above           No follow-ups on file.    Becki Fernandez MD, MD  [facility]   Ramy is a 60 year old who presents for the following health issues     Oxybutynin Refill   took it for about a month and it worked well. Pt would like to continue. Using this to help fully empty the bladder so there is less liquid in the stool   Has noted a difference.     Sister is a carrier for Factor V and had blood clot in her brain.     History of Present Illness       He eats 0-1 servings of fruits and vegetables daily.He consumes 0 sweetened beverage(s) daily.He exercises  with enough effort to increase his heart rate 10 to 19 minutes per day.  He exercises with enough effort to increase his heart rate 4 days per week.   He is taking medications regularly.           Review of Systems   Constitutional, HEENT, cardiovascular, pulmonary, GI, , musculoskeletal, neuro, skin, endocrine and psych systems are negative, except as otherwise noted.      Objective           Vitals:  No vitals were obtained today due to virtual visit.    Physical Exam   healthy, alert and no distress  PSYCH: Alert and oriented times 3; coherent speech, normal   rate and volume, able to articulate logical thoughts, able   to abstract reason, no tangential thoughts, no hallucinations   or delusions  His affect is normal  RESP: No cough, no audible wheezing, able to talk in full sentences  Remainder of exam unable to be completed due to telephone visits            Phone call duration: 15 minutes

## 2021-02-11 ENCOUNTER — VIRTUAL VISIT (OUTPATIENT)
Dept: FAMILY MEDICINE | Facility: OTHER | Age: 61
End: 2021-02-11
Payer: COMMERCIAL

## 2021-02-11 DIAGNOSIS — Z13.220 SCREENING FOR HYPERLIPIDEMIA: ICD-10-CM

## 2021-02-11 DIAGNOSIS — E78.5 HYPERLIPIDEMIA WITH TARGET LDL LESS THAN 130: ICD-10-CM

## 2021-02-11 DIAGNOSIS — R39.14 BENIGN PROSTATIC HYPERPLASIA WITH INCOMPLETE BLADDER EMPTYING: Primary | ICD-10-CM

## 2021-02-11 DIAGNOSIS — R73.01 IMPAIRED FASTING GLUCOSE: ICD-10-CM

## 2021-02-11 DIAGNOSIS — D69.6 THROMBOCYTOPENIA (H): ICD-10-CM

## 2021-02-11 DIAGNOSIS — Z83.2 FAMILY HISTORY OF FACTOR V LEIDEN MUTATION: ICD-10-CM

## 2021-02-11 DIAGNOSIS — N40.1 BENIGN PROSTATIC HYPERPLASIA WITH INCOMPLETE BLADDER EMPTYING: Primary | ICD-10-CM

## 2021-02-11 PROCEDURE — 99214 OFFICE O/P EST MOD 30 MIN: CPT | Mod: 95 | Performed by: FAMILY MEDICINE

## 2021-02-11 RX ORDER — OXYBUTYNIN CHLORIDE 10 MG/1
10 TABLET, EXTENDED RELEASE ORAL DAILY
Qty: 90 TABLET | Refills: 3 | Status: SHIPPED | OUTPATIENT
Start: 2021-02-11 | End: 2021-02-23

## 2021-02-12 DIAGNOSIS — R39.14 BENIGN PROSTATIC HYPERPLASIA WITH INCOMPLETE BLADDER EMPTYING: ICD-10-CM

## 2021-02-12 DIAGNOSIS — E78.5 HYPERLIPIDEMIA WITH TARGET LDL LESS THAN 130: ICD-10-CM

## 2021-02-12 DIAGNOSIS — D69.6 THROMBOCYTOPENIA (H): ICD-10-CM

## 2021-02-12 DIAGNOSIS — N40.1 BENIGN PROSTATIC HYPERPLASIA WITH INCOMPLETE BLADDER EMPTYING: ICD-10-CM

## 2021-02-12 DIAGNOSIS — R73.01 IMPAIRED FASTING GLUCOSE: ICD-10-CM

## 2021-02-12 DIAGNOSIS — Z83.2 FAMILY HISTORY OF FACTOR V LEIDEN MUTATION: ICD-10-CM

## 2021-02-12 LAB
ANION GAP SERPL CALCULATED.3IONS-SCNC: 2 MMOL/L (ref 3–14)
BUN SERPL-MCNC: 22 MG/DL (ref 7–30)
CALCIUM SERPL-MCNC: 8.9 MG/DL (ref 8.5–10.1)
CHLORIDE SERPL-SCNC: 105 MMOL/L (ref 94–109)
CHOLEST SERPL-MCNC: 230 MG/DL
CO2 SERPL-SCNC: 32 MMOL/L (ref 20–32)
CREAT SERPL-MCNC: 1.1 MG/DL (ref 0.66–1.25)
ERYTHROCYTE [DISTWIDTH] IN BLOOD BY AUTOMATED COUNT: 11.8 % (ref 10–15)
GFR SERPL CREATININE-BSD FRML MDRD: 72 ML/MIN/{1.73_M2}
GLUCOSE SERPL-MCNC: 109 MG/DL (ref 70–99)
HCT VFR BLD AUTO: 46.2 % (ref 40–53)
HDLC SERPL-MCNC: 48 MG/DL
HGB BLD-MCNC: 15.9 G/DL (ref 13.3–17.7)
LDLC SERPL CALC-MCNC: 153 MG/DL
MCH RBC QN AUTO: 33 PG (ref 26.5–33)
MCHC RBC AUTO-ENTMCNC: 34.4 G/DL (ref 31.5–36.5)
MCV RBC AUTO: 96 FL (ref 78–100)
NONHDLC SERPL-MCNC: 182 MG/DL
PLATELET # BLD AUTO: 144 10E9/L (ref 150–450)
POTASSIUM SERPL-SCNC: 4.8 MMOL/L (ref 3.4–5.3)
PSA SERPL-ACNC: 0.3 UG/L (ref 0–4)
RBC # BLD AUTO: 4.82 10E12/L (ref 4.4–5.9)
SODIUM SERPL-SCNC: 139 MMOL/L (ref 133–144)
TRIGL SERPL-MCNC: 147 MG/DL
WBC # BLD AUTO: 5.8 10E9/L (ref 4–11)

## 2021-02-12 PROCEDURE — 80048 BASIC METABOLIC PNL TOTAL CA: CPT | Performed by: FAMILY MEDICINE

## 2021-02-12 PROCEDURE — 80061 LIPID PANEL: CPT | Performed by: FAMILY MEDICINE

## 2021-02-12 PROCEDURE — 81241 F5 GENE: CPT | Performed by: FAMILY MEDICINE

## 2021-02-12 PROCEDURE — G0452 MOLECULAR PATHOLOGY INTERPR: HCPCS | Mod: 59 | Performed by: FAMILY MEDICINE

## 2021-02-12 PROCEDURE — G0103 PSA SCREENING: HCPCS | Performed by: FAMILY MEDICINE

## 2021-02-12 PROCEDURE — 36415 COLL VENOUS BLD VENIPUNCTURE: CPT | Performed by: FAMILY MEDICINE

## 2021-02-12 PROCEDURE — 85027 COMPLETE CBC AUTOMATED: CPT | Performed by: FAMILY MEDICINE

## 2021-02-15 LAB — COPATH REPORT: NORMAL

## 2021-02-23 ENCOUNTER — MYC MEDICAL ADVICE (OUTPATIENT)
Dept: FAMILY MEDICINE | Facility: OTHER | Age: 61
End: 2021-02-23

## 2021-02-23 DIAGNOSIS — R39.14 BENIGN PROSTATIC HYPERPLASIA WITH INCOMPLETE BLADDER EMPTYING: ICD-10-CM

## 2021-02-23 DIAGNOSIS — N40.1 BENIGN PROSTATIC HYPERPLASIA WITH INCOMPLETE BLADDER EMPTYING: ICD-10-CM

## 2021-02-23 RX ORDER — OXYBUTYNIN CHLORIDE 10 MG/1
10 TABLET, EXTENDED RELEASE ORAL DAILY
Qty: 90 TABLET | Refills: 0 | Status: SHIPPED | OUTPATIENT
Start: 2021-02-23 | End: 2021-05-28

## 2021-02-23 NOTE — TELEPHONE ENCOUNTER
Sent [employer] to verify pharmacy address.     Luis Pacheco RN, BSN  [facility]  February 23, 2021

## 2021-05-27 DIAGNOSIS — R39.14 BENIGN PROSTATIC HYPERPLASIA WITH INCOMPLETE BLADDER EMPTYING: ICD-10-CM

## 2021-05-27 DIAGNOSIS — N40.1 BENIGN PROSTATIC HYPERPLASIA WITH INCOMPLETE BLADDER EMPTYING: ICD-10-CM

## 2021-05-28 RX ORDER — OXYBUTYNIN CHLORIDE 10 MG/1
TABLET, EXTENDED RELEASE ORAL
Qty: 90 TABLET | Refills: 1 | Status: SHIPPED | OUTPATIENT
Start: 2021-05-28 | End: 2021-12-16

## 2021-10-06 ENCOUNTER — VIRTUAL VISIT (OUTPATIENT)
Dept: FAMILY MEDICINE | Facility: OTHER | Age: 61
End: 2021-10-06
Payer: COMMERCIAL

## 2021-10-06 DIAGNOSIS — J01.90 ACUTE SINUSITIS TREATED WITH ANTIBIOTICS IN THE PAST 60 DAYS: Primary | ICD-10-CM

## 2021-10-06 DIAGNOSIS — L40.50 PSORIATIC ARTHRITIS (H): ICD-10-CM

## 2021-10-06 PROCEDURE — 99214 OFFICE O/P EST MOD 30 MIN: CPT | Mod: 95 | Performed by: NURSE PRACTITIONER

## 2021-10-06 NOTE — PROGRESS NOTES
Ramy is a 61 year old who is being evaluated via a billable video visit.      How would you like to obtain your AVS? MyChart  If the video visit is dropped, the invitation should be resent by: Send to e-mail at: [email or web address]  Will anyone else be joining your video visit? No    Video Start Time: 8:45 AM    Assessment & Plan     Acute sinusitis treated with antibiotics in the past 60 days  - Covid-19 vaccinated with Moderna  - Immunocompromised  - Having sinus pain as noted below  - Recommend Augmentin twice daily for 10 days.   - Advised to test for covid-19 with me later this week if no improvements or changes.   - will get moderna booster and influenza vaccine at pharmacy.   - amoxicillin-clavulanate (AUGMENTIN) 875-125 MG tablet; Take 1 tablet by mouth 2 times daily for 10 days    Psoriatic arthritis  Discussed above and impacts of his chronic condition on acute treatment and moderna booster.        He will send me a message if he feels he needs to have covid-19 testing advised there is a chance that this could be covid symptoms as well given the vaccine does have breakthrough cases.     The patient indicates understanding of these issues and agrees with the plan.    There are no Patient Instructions on file for this visit.    No follow-ups on file.    TIFFANI Medellin CNP  [facility]   Ramy is a 61 year old who presents for the following health issues     HPI     Sinus   Not plugged up as much   Feeling his tenderness along the left side and teeth aching and now up on the side (left side)  Had it before and quite often. Been awhile  No fevers or chills.   Had the Moderna   On Humera and can;t do it if he has an infection        Review of Systems   As noted above       Objective           Vitals:  No vitals were obtained today due to virtual visit.    Physical Exam   GENERAL: Congested sounding.   EYES: Eyes grossly normal to inspection.  No discharge or erythema, or  obvious scleral/conjunctival abnormalities.  Points to tenderness along the maxillary and frontal sinus area with tenderness in his teeth.   RESP: No audible wheeze, cough, or visible cyanosis.  No visible retractions or increased work of breathing.    SKIN: Visible skin clear. No significant rash, abnormal pigmentation or lesions.  NEURO: Cranial nerves grossly intact.  Mentation and speech appropriate for age.  PSYCH: Mentation appears normal, affect normal/bright, judgement and insight intact, normal speech and appearance well-groomed.    Diagnostic: None     Video-Visit Details    Type of service:  Video Visit    Video End Time:8:52 AM    Originating Location (pt. Location): Home    Distant Location (provider location):  [facility]     Platform used for Video Visit: [employer]CaseFiles

## 2021-10-12 ENCOUNTER — TRANSFERRED RECORDS (OUTPATIENT)
Dept: HEALTH INFORMATION MANAGEMENT | Facility: CLINIC | Age: 61
End: 2021-10-12

## 2021-10-25 NOTE — TELEPHONE ENCOUNTER
Pt informed. Will call back to schedule.    Quality 431: Preventive Care And Screening: Unhealthy Alcohol Use - Screening: Patient identified as an unhealthy alcohol user when screened for unhealthy alcohol use using a systematic screening method and received brief counseling Quality 226: Preventive Care And Screening: Tobacco Use: Screening And Cessation Intervention: Patient screened for tobacco use and is an ex/non-smoker Detail Level: Detailed Quality 130: Documentation Of Current Medications In The Medical Record: Current Medications Documented

## 2021-12-13 DIAGNOSIS — N40.1 BENIGN PROSTATIC HYPERPLASIA WITH INCOMPLETE BLADDER EMPTYING: ICD-10-CM

## 2021-12-13 DIAGNOSIS — R39.14 BENIGN PROSTATIC HYPERPLASIA WITH INCOMPLETE BLADDER EMPTYING: ICD-10-CM

## 2021-12-16 RX ORDER — OXYBUTYNIN CHLORIDE 10 MG/1
TABLET, EXTENDED RELEASE ORAL
Qty: 90 TABLET | Refills: 3 | Status: SHIPPED | OUTPATIENT
Start: 2021-12-16 | End: 2022-11-28

## 2021-12-16 NOTE — TELEPHONE ENCOUNTER
Prescription approved per [facility] Refill Protocol.  JASON BrarN, RN, PHN  [place]  December 16, 2021

## 2021-12-18 ENCOUNTER — HEALTH MAINTENANCE LETTER (OUTPATIENT)
Age: 61
End: 2021-12-18

## 2022-01-03 ASSESSMENT — ENCOUNTER SYMPTOMS
ARTHRALGIAS: 0
FEVER: 0
HEARTBURN: 0
DIARRHEA: 0
HEADACHES: 0
CONSTIPATION: 0
ABDOMINAL PAIN: 0
WEAKNESS: 0
DYSURIA: 0
JOINT SWELLING: 0
NAUSEA: 0
HEMATOCHEZIA: 0
HEMATURIA: 0
SHORTNESS OF BREATH: 0
SORE THROAT: 0
DIZZINESS: 0
EYE PAIN: 0
NERVOUS/ANXIOUS: 0
CHILLS: 0
PARESTHESIAS: 0
COUGH: 0
FREQUENCY: 1
PALPITATIONS: 0
MYALGIAS: 1

## 2022-01-05 ENCOUNTER — OFFICE VISIT (OUTPATIENT)
Dept: FAMILY MEDICINE | Facility: OTHER | Age: 62
End: 2022-01-05
Payer: COMMERCIAL

## 2022-01-05 VITALS
SYSTOLIC BLOOD PRESSURE: 125 MMHG | OXYGEN SATURATION: 97 % | HEART RATE: 75 BPM | WEIGHT: 175 LBS | BODY MASS INDEX: 27.47 KG/M2 | HEIGHT: 67 IN | RESPIRATION RATE: 22 BRPM | TEMPERATURE: 97.9 F | DIASTOLIC BLOOD PRESSURE: 73 MMHG

## 2022-01-05 DIAGNOSIS — L40.50 PSORIATIC ARTHRITIS (H): ICD-10-CM

## 2022-01-05 DIAGNOSIS — Z23 HIGH PRIORITY FOR 2019-NCOV VACCINE: ICD-10-CM

## 2022-01-05 DIAGNOSIS — R73.01 IMPAIRED FASTING GLUCOSE: ICD-10-CM

## 2022-01-05 DIAGNOSIS — Z00.00 ROUTINE GENERAL MEDICAL EXAMINATION AT A HEALTH CARE FACILITY: Primary | ICD-10-CM

## 2022-01-05 DIAGNOSIS — E78.5 HYPERLIPIDEMIA WITH TARGET LDL LESS THAN 130: ICD-10-CM

## 2022-01-05 DIAGNOSIS — D69.6 THROMBOCYTOPENIA (H): ICD-10-CM

## 2022-01-05 DIAGNOSIS — Z79.899 HIGH RISK MEDICATIONS (NOT ANTICOAGULANTS) LONG-TERM USE: ICD-10-CM

## 2022-01-05 DIAGNOSIS — G47.33 OBSTRUCTIVE SLEEP APNEA: ICD-10-CM

## 2022-01-05 DIAGNOSIS — B35.1 ONYCHOMYCOSIS: ICD-10-CM

## 2022-01-05 LAB
ALBUMIN SERPL-MCNC: 3.7 G/DL (ref 3.4–5)
ALP SERPL-CCNC: 66 U/L (ref 40–150)
ALT SERPL W P-5'-P-CCNC: 37 U/L (ref 0–70)
ANION GAP SERPL CALCULATED.3IONS-SCNC: 5 MMOL/L (ref 3–14)
AST SERPL W P-5'-P-CCNC: 33 U/L (ref 0–45)
BILIRUB DIRECT SERPL-MCNC: 0.1 MG/DL (ref 0–0.2)
BILIRUB SERPL-MCNC: 0.7 MG/DL (ref 0.2–1.3)
BUN SERPL-MCNC: 19 MG/DL (ref 7–30)
CALCIUM SERPL-MCNC: 9 MG/DL (ref 8.5–10.1)
CHLORIDE BLD-SCNC: 107 MMOL/L (ref 94–109)
CHOLEST SERPL-MCNC: 250 MG/DL
CO2 SERPL-SCNC: 28 MMOL/L (ref 20–32)
CREAT SERPL-MCNC: 1.04 MG/DL (ref 0.66–1.25)
ERYTHROCYTE [DISTWIDTH] IN BLOOD BY AUTOMATED COUNT: 11.6 % (ref 10–15)
FASTING STATUS PATIENT QL REPORTED: YES
GFR SERPL CREATININE-BSD FRML MDRD: 81 ML/MIN/1.73M2
GLUCOSE BLD-MCNC: 106 MG/DL (ref 70–99)
HCT VFR BLD AUTO: 42.7 % (ref 40–53)
HDLC SERPL-MCNC: 56 MG/DL
HGB BLD-MCNC: 14.8 G/DL (ref 13.3–17.7)
LDLC SERPL CALC-MCNC: 167 MG/DL
MCH RBC QN AUTO: 33.3 PG (ref 26.5–33)
MCHC RBC AUTO-ENTMCNC: 34.7 G/DL (ref 31.5–36.5)
MCV RBC AUTO: 96 FL (ref 78–100)
NONHDLC SERPL-MCNC: 194 MG/DL
PLATELET # BLD AUTO: 133 10E3/UL (ref 150–450)
POTASSIUM BLD-SCNC: 3.8 MMOL/L (ref 3.4–5.3)
PROT SERPL-MCNC: 7.3 G/DL (ref 6.8–8.8)
RBC # BLD AUTO: 4.44 10E6/UL (ref 4.4–5.9)
SODIUM SERPL-SCNC: 140 MMOL/L (ref 133–144)
TRIGL SERPL-MCNC: 133 MG/DL
WBC # BLD AUTO: 5.9 10E3/UL (ref 4–11)

## 2022-01-05 PROCEDURE — 0064A COVID-19,PF,MODERNA (18+ YRS BOOSTER .25ML): CPT | Performed by: FAMILY MEDICINE

## 2022-01-05 PROCEDURE — 87107 FUNGI IDENTIFICATION MOLD: CPT | Mod: 59 | Performed by: FAMILY MEDICINE

## 2022-01-05 PROCEDURE — 82248 BILIRUBIN DIRECT: CPT | Performed by: FAMILY MEDICINE

## 2022-01-05 PROCEDURE — 99396 PREV VISIT EST AGE 40-64: CPT | Performed by: FAMILY MEDICINE

## 2022-01-05 PROCEDURE — 80053 COMPREHEN METABOLIC PANEL: CPT | Performed by: FAMILY MEDICINE

## 2022-01-05 PROCEDURE — 36415 COLL VENOUS BLD VENIPUNCTURE: CPT | Performed by: FAMILY MEDICINE

## 2022-01-05 PROCEDURE — 85027 COMPLETE CBC AUTOMATED: CPT | Performed by: FAMILY MEDICINE

## 2022-01-05 PROCEDURE — 80061 LIPID PANEL: CPT | Performed by: FAMILY MEDICINE

## 2022-01-05 PROCEDURE — 91306 COVID-19,PF,MODERNA (18+ YRS BOOSTER .25ML): CPT | Performed by: FAMILY MEDICINE

## 2022-01-05 PROCEDURE — 99214 OFFICE O/P EST MOD 30 MIN: CPT | Mod: 25 | Performed by: FAMILY MEDICINE

## 2022-01-05 PROCEDURE — 87101 SKIN FUNGI CULTURE: CPT | Performed by: FAMILY MEDICINE

## 2022-01-05 PROCEDURE — 87106 FUNGI IDENTIFICATION YEAST: CPT | Performed by: FAMILY MEDICINE

## 2022-01-05 ASSESSMENT — ENCOUNTER SYMPTOMS
NAUSEA: 0
WEAKNESS: 0
DIZZINESS: 0
FREQUENCY: 1
HEMATURIA: 0
CHILLS: 0
HEARTBURN: 0
HEADACHES: 0
CONSTIPATION: 0
SHORTNESS OF BREATH: 0
JOINT SWELLING: 0
PARESTHESIAS: 0
DYSURIA: 0
DIARRHEA: 0
MYALGIAS: 1
SORE THROAT: 0
PALPITATIONS: 0
EYE PAIN: 0
HEMATOCHEZIA: 0
COUGH: 0
ARTHRALGIAS: 0
NERVOUS/ANXIOUS: 0
ABDOMINAL PAIN: 0
FEVER: 0

## 2022-01-05 ASSESSMENT — MIFFLIN-ST. JEOR: SCORE: 1554.42

## 2022-01-05 ASSESSMENT — PAIN SCALES - GENERAL: PAINLEVEL: NO PAIN (0)

## 2022-01-05 NOTE — PROGRESS NOTES
SUBJECTIVE:   CC: Jaskaran Car is an 61 year old male who presents for preventative health visit.       Patient has been advised of split billing requirements and indicates understanding: Yes  Imm/Inj  Associated symptoms include myalgias. Pertinent negatives include no abdominal pain, arthralgias, chest pain, chills, congestion, coughing, fever, headaches, joint swelling, nausea, rash, sore throat or weakness.   Healthy Habits:     Getting at least 3 servings of Calcium per day:  NO    Bi-annual eye exam:  Yes    Dental care twice a year:  Yes    Sleep apnea or symptoms of sleep apnea:  Excessive snoring and Sleep apnea    Diet:  Low salt and Low fat/cholesterol    Frequency of exercise:  4-5 days/week    Duration of exercise:  45-60 minutes    Taking medications regularly:  Yes    Medication side effects:  None    PHQ-2 Total Score: 0    Additional concerns today:  Yes        Today's PHQ-2 Score:   PHQ-2 ( 1999 [employer]) 1/3/2022   Q1: Little interest or pleasure in doing things 0   Q2: Feeling down, depressed or hopeless 0   PHQ-2 Score 0   PHQ-2 Total Score (12-17 Years)- Positive if 3 or more points; Administer PHQ-A if positive -   Q1: Little interest or pleasure in doing things Not at all   Q2: Feeling down, depressed or hopeless Not at all   PHQ-2 Score 0     Abuse: Current or Past(Physical, Sexual or Emotional)- No  Do you feel safe in your environment? Yes    Social History     Tobacco Use     Smoking status: Never Smoker     Smokeless tobacco: Never Used     Tobacco comment: no smokers in the household   Substance Use Topics     Alcohol use: Yes     Comment: 3 to 4 drinks a week         Alcohol Use 1/3/2022   Prescreen: >3 drinks/day or >7 drinks/week? No   Prescreen: >3 drinks/day or >7 drinks/week? -       Last PSA:   PSA   Date Value Ref Range Status   02/12/2021 0.30 0 - 4 ug/L Final     Comment:     Assay Method:  Chemiluminescence using Siemens Vista analyzer       Reviewed orders with patient.  "Reviewed health maintenance and updated orders accordingly - Yes  Lab work is in process    Reviewed and updated as needed this visit by clinical staff  Tobacco  Allergies  Meds  Problems  Med Hx  Surg Hx  Fam Hx         Reviewed and updated as needed this visit by Provider  Tobacco  Allergies  Meds  Problems  Med Hx  Surg Hx  Fam Hx            Review of Systems   Constitutional: Negative for chills and fever.   HENT: Negative for congestion, ear pain, hearing loss and sore throat.    Eyes: Negative for pain and visual disturbance.   Respiratory: Negative for cough and shortness of breath.    Cardiovascular: Negative for chest pain, palpitations and peripheral edema.   Gastrointestinal: Negative for abdominal pain, constipation, diarrhea, heartburn, hematochezia and nausea.   Genitourinary: Positive for frequency and urgency. Negative for dysuria, genital sores, hematuria, impotence and penile discharge.   Musculoskeletal: Positive for myalgias. Negative for arthralgias and joint swelling.   Skin: Negative for rash.   Neurological: Negative for dizziness, weakness, headaches and paresthesias.   Psychiatric/Behavioral: Negative for mood changes. The patient is not nervous/anxious.          OBJECTIVE:   /73   Pulse 75   Temp 97.9  F (36.6  C) (Oral)   Ht 1.705 m (5' 7.13\")   Wt 79.4 kg (175 lb)   SpO2 97%   BMI 27.31 kg/m      Physical Exam  GENERAL: healthy, alert and no distress  EYES: Eyes grossly normal to inspection, PERRL and conjunctivae and sclerae normal  HENT: ear canals and TM's normal, nose and mouth without ulcers or lesions  NECK: no adenopathy, no asymmetry, masses, or scars and thyroid normal to palpation  RESP: lungs clear to auscultation - no rales, rhonchi or wheezes  CV: regular rate and rhythm, normal S1 S2, no S3 or S4, no murmur, click or rub, no peripheral edema and peripheral pulses strong  ABDOMEN: soft, nontender, no hepatosplenomegaly, no masses and bowel sounds " normal  MS: no gross musculoskeletal defects noted, no edema  SKIN: no suspicious lesions or rashes  NEURO: Normal strength and tone, mentation intact and speech normal  PSYCH: mentation appears normal, affect normal/bright  Foot: thickened toenail        ASSESSMENT/PLAN:       ICD-10-CM    1. Routine general medical examination at a health care facility  Z00.00    2. Psoriatic arthritis (H)  L40.50    3. High risk medications (not anticoagulants) long-term use  Z79.899    4. Hyperlipidemia with target LDL less than 130  E78.5 Lipid panel reflex to direct LDL Fasting     Lipid panel reflex to direct LDL Fasting   5. Impaired fasting glucose  R73.01 Basic metabolic panel  (Ca, Cl, CO2, Creat, Gluc, K, Na, BUN)     Basic metabolic panel  (Ca, Cl, CO2, Creat, Gluc, K, Na, BUN)   6. Obstructive sleep apnea- CPAP at 5 cm water  G47.33 Basic metabolic panel  (Ca, Cl, CO2, Creat, Gluc, K, Na, BUN)     CPAP/Comprehensive Sleep Order     Basic metabolic panel  (Ca, Cl, CO2, Creat, Gluc, K, Na, BUN)   7. High priority for 2019-nCoV vaccine  Z23 COVID-19,PF,MODERNA (18+ Yrs BOOSTER .25mL)   8. Thrombocytopenia (H)  D69.6 CBC with platelets     CBC with platelets   9. Onychomycosis  B35.1 Fungus Culture,  skin, hair, or nail     Hepatic panel (Albumin, ALT, AST, Bili, Alk Phos, TP)     Hepatic panel (Albumin, ALT, AST, Bili, Alk Phos, TP)     Patient has been managing his psoriatic arthritis with the rheumatologist and has been well controlled.  He goes on Boy  camps each year and needed his form filled out today which was completed.  He is to update his labs and is fasting and will add in hepatic labs due to the fact that he has a thickened toenail he is concerned is for fungus.  Of nail clipping was also sent for fungal growth and medications are desired if he is a candidate.    Patient has been advised of split billing requirements and indicates understanding: Yes  COUNSELING:   Reviewed preventive health counseling, as  "reflected in patient instructions       Regular exercise       Healthy diet/nutrition    Estimated body mass index is 27.31 kg/m  as calculated from the following:    Height as of this encounter: 1.705 m (5' 7.13\").    Weight as of this encounter: 79.4 kg (175 lb).     Weight management plan: Discussed healthy diet and exercise guidelines    He reports that he has never smoked. He has never used smokeless tobacco.      Counseling Resources:  ATP IV Guidelines  Pooled Cohorts Equation Calculator  FRAX Risk Assessment  ICSI Preventive Guidelines  Dietary Guidelines for Americans, 2010  USDA's MyPlate  ASA Prophylaxis  Lung CA Screening    Becki Fernandez MD, MD  [facility]  DME (Durable Medical Equipment) Orders and Documentation  No orders of the defined types were placed in this encounter.     The patient was assessed and it was determined the patient is in need of the following listed DME Supplies/Equipment. Please complete supporting documentation below to demonstrate medical necessity.      DME All Other Item(s) Documentation    List reason for need and supporting documentation for medical necessity below for each DME item.     1. OMKAR - well treated and needs updated supplies. Uses every night.          "

## 2022-01-09 RX ORDER — TERBINAFINE HYDROCHLORIDE 250 MG/1
250 TABLET ORAL DAILY
Qty: 90 TABLET | Refills: 0 | Status: SHIPPED | OUTPATIENT
Start: 2022-01-09 | End: 2022-04-09

## 2022-02-02 LAB
BACTERIA SPEC CULT: ABNORMAL

## 2022-02-09 ENCOUNTER — E-VISIT (OUTPATIENT)
Dept: URGENT CARE | Facility: CLINIC | Age: 62
End: 2022-02-09
Payer: COMMERCIAL

## 2022-02-09 DIAGNOSIS — J01.90 ACUTE SINUSITIS, RECURRENCE NOT SPECIFIED, UNSPECIFIED LOCATION: Primary | ICD-10-CM

## 2022-02-09 PROCEDURE — 99421 OL DIG E/M SVC 5-10 MIN: CPT | Performed by: NURSE PRACTITIONER

## 2022-02-09 RX ORDER — FLUTICASONE PROPIONATE 50 MCG
1 SPRAY, SUSPENSION (ML) NASAL DAILY
Qty: 18.2 G | Refills: 0 | Status: SHIPPED | OUTPATIENT
Start: 2022-02-09 | End: 2022-07-13

## 2022-02-09 RX ORDER — OMEGA-3 FATTY ACIDS/FISH OIL 300-1000MG
800 CAPSULE ORAL EVERY 8 HOURS PRN
Qty: 20 CAPSULE | Refills: 0 | Status: SHIPPED | OUTPATIENT
Start: 2022-02-09 | End: 2023-11-29

## 2022-02-09 NOTE — PATIENT INSTRUCTIONS

## 2022-04-14 ENCOUNTER — TRANSFERRED RECORDS (OUTPATIENT)
Dept: HEALTH INFORMATION MANAGEMENT | Facility: CLINIC | Age: 62
End: 2022-04-14
Payer: COMMERCIAL

## 2022-07-11 ENCOUNTER — NURSE TRIAGE (OUTPATIENT)
Dept: FAMILY MEDICINE | Facility: OTHER | Age: 62
End: 2022-07-11

## 2022-07-11 NOTE — TELEPHONE ENCOUNTER
"Patient requesting appointment with provider in clinic for what he feels is a hernia.  States onset left side lower abdominal discomfort just to inside of hip on 5/13/22.  States he was working out and just coming up from a front squat.  States that he cannot feel a lump or bulge.  Hurts when coughs or if tries to run it hurts when lands on left foot.  No other symptoms.  Wanting appointment with provider.  Unable to find one this week at [place], [place], or [place].  He was unable to take one this morning; states would need to be after 11:30.  Did advise on our urgent care hours [facility] and [place] locations.  Given addresses.  He states will be seen there.  He then asked if could schedule a future appointment for him.  I forwarded him to our main scheduling line for assistance.  Cristin Hanks, RN      Reason for Disposition    [1] New-onset hernia suspected (reducible bulge in groin or abdomen; non-tender) AND [2] NO pain or vomiting    Additional Information    Negative: [1] Swelling of scrotum AND [2] has not previously been diagnosed with a hernia    Negative: SEVERE abdominal pain    Negative: Hernia is painful or tender to touch    Negative: [1] Vomiting AND [2] can't reduce the hernia    Negative: [1] Vomiting AND [2] abdomen looks much more swollen than usual    Negative: [1] Swollen lump in groin AND [2] pulsating (like heartbeat)    Negative: Patient sounds very sick or weak to the triager    Negative: [1] Constant abdominal pain AND [2] present > 2 hours  (NO pain or tenderness of hernia)    Negative: Can't reduce the hernia (NO pain, local tenderness, or vomiting)    Answer Assessment - Initial Assessment Questions  1. ONSET:  \"When did this first appear?\"      5/13/22.  States was doing a front squat/coming up out of front squat when it occurred.  Hasn't worsened but hasn't gotten any better  2. APPEARANCE: \"What does it look like?\"      Can't feel or see  3. SIZE: \"How big is it?\" (inches, " "cm or compare to coins, fruit)      unknown  4. LOCATION: \"Where exactly is the hernia located?\"      Left side lower abdomen near hip bone  5. PATTERN: \"Does the swelling come and go, or has it been constant since it started?\"      n/a  6. PAIN: \"Is there any pain?\" If so, ask: \"How bad is it?\"  (Scale 1-10; or mild, moderate, severe)      Pain mostly mild.  Worse when coughs or squats down.  Running (coming down onto left leg) bothers it.  7. DIAGNOSIS: \"Have you been seen by a doctor for this?\" \"Did the doctor diagnose you as having a hernia?\"      no  8. OTHER SYMPTOMS: \"Do you have any other symptoms?\" (e.g., fever, abdominal pain, vomiting)      no  9. PREGNANCY: \"Is there any chance you are pregnant?\" \"When was your last menstrual period?\"      n/a    Protocols used: HERNIA-A-AH      "

## 2022-07-12 ENCOUNTER — OFFICE VISIT (OUTPATIENT)
Dept: URGENT CARE | Facility: URGENT CARE | Age: 62
End: 2022-07-12
Payer: COMMERCIAL

## 2022-07-12 VITALS
DIASTOLIC BLOOD PRESSURE: 85 MMHG | HEART RATE: 58 BPM | OXYGEN SATURATION: 98 % | TEMPERATURE: 97.3 F | SYSTOLIC BLOOD PRESSURE: 133 MMHG | BODY MASS INDEX: 27.62 KG/M2 | WEIGHT: 177 LBS

## 2022-07-12 DIAGNOSIS — Z71.1 CONCERN ABOUT HERNIA WITHOUT DIAGNOSIS: ICD-10-CM

## 2022-07-12 DIAGNOSIS — R10.31 ABDOMINAL PAIN, RIGHT LOWER QUADRANT: Primary | ICD-10-CM

## 2022-07-12 PROCEDURE — 99213 OFFICE O/P EST LOW 20 MIN: CPT | Performed by: INTERNAL MEDICINE

## 2022-07-12 NOTE — PROGRESS NOTES
"ED Provider Note    CHIEF COMPLAINT  Chief Complaint   Patient presents with   • Shortness of Breath     Pt had cough starting in novemeber, took abx, sob persisted and increased significantly yesterday. Pt went to , told he had pleural effusion.        HPI  Farhat Stahl is a 80 y.o. male who presents to the emergency department through triage for cough and shortness of breath.  Patient was referred from urgent care after evaluation yesterday demonstrated a large left pleural effusion.  Patient denies history of the same.  He does describe cough since early November, initially dry but now productive of clear sputum for 1 week.  He is having exertional dyspnea as well which is new for him.  No orthopnea, although he wears a CPAP and this has been quite helpful at night.  No lower extremity edema.  No chest pain, palpitations or syncope.  Denies fever or chills.  Denies night sweats or weight loss.  Normal appetite.    Patient states he has been treated for \"walking pneumonia\" twice previously although this was years ago.  Seasonal allergies, recurrent sinusitis for which she had antibiotics a month ago.    REVIEW OF SYSTEMS  See HPI for further details. All other systems are negative.     PAST MEDICAL HISTORY   has a past medical history of Arrhythmia, Arthritis, Bronchitis (2004), Fibula fracture (1981), High cholesterol, Hyperlipidemia, Hypertension, MEDICAL HOME, Pain, Pneumonia (2004), and Sleep apnea.    SOCIAL HISTORY  Social History     Tobacco Use   • Smoking status: Never Smoker   • Smokeless tobacco: Never Used   Substance and Sexual Activity   • Alcohol use: Yes     Alcohol/week: 0.6 - 1.2 oz     Types: 1 - 2 Shots of liquor per week     Frequency: 2-3 times a week     Drinks per session: 1 or 2     Comment: occ   • Drug use: No   • Sexual activity: Never       SURGICAL HISTORY   has a past surgical history that includes tonsillectomy (1945); exostosis excision (6/3/2011); lesion excision ortho " SUBJECTIVE:  Jaskaran Car is an 62 year old male who presents for pain in lower right abdomen/groin.  Has had since May 13th when was lifting at the gym and felt something pop and some sudden pain.  The pain has persisted but is a little better.  Radiates to upper leg a little and that has improved but still has pain in right lower groin area.  Lying down is most uncomfortable.  Walking is typically not painful.  Hurts if tries to run or jump.  No fevers, chills,  Sweats.  No n/v/d.  No redness or bruising in the groin.  Hasn't felt a bulge.  Thought he pulled a muscle but is not improving.  Has tried not lifting weights or doing work outs, but isn't improving.  No meds taken for sxs.  Had gallbladder removed in past, no other hx of abdominal or groin surgery.  No swelling or pain in scrotum or testicles.    PMH:   has a past medical history of Arthritis, Chondromalacia of patella, Displacement of lumbar intervertebral disc without myelopathy (10/02), External hemorrhoids, Leukopenia (5/1/2013), Leukopenia, Obstructive sleep apnea- CPAP at 5 cm water (2/23/2012), Right ankle pain (June 2006), Right knee pain (summer 2007), Thrombocytopaenia (6/12/2013), and Thrombocytopenia (H).  Patient Active Problem List   Diagnosis     Displacement of lumbar intervertebral disc without myelopathy     Cholecystitis     Benign prostatic hyperplasia with urinary frequency     Advanced directives, counseling/discussion     Patellar tendinitis     Knee pain     Obstructive sleep apnea- CPAP at 5 cm water     Pain in joint, ankle and foot     Inverse psoriasis     High risk medications (not anticoagulants) long-term use     Psoriatic arthritis (H)     High risk medication use     Thrombocytopenia (H)     Hyperlipidemia with target LDL less than 130     Impaired fasting glucose     Acute pharyngitis     Social History     Socioeconomic History     Marital status:      Spouse name: Reshma     Number of children: 2     Years of  Spoke to patient about the pathology results. Patient verbalized understanding. He denies any questions or concerns.    "(6/3/2011); toe arthroplasty (6/3/2011); lumbar laminectomy diskectomy (2005); and hammertoe correction (2/2/2015).    CURRENT MEDICATIONS  Home Medications     Reviewed by Eb Chacon (Pharmacy Tech) on 01/06/21 at 0941  Med List Status: Complete   Medication Last Dose Status   albuterol 108 (90 Base) MCG/ACT Aero Soln inhalation aerosol > 2 days Active   aspirin 81 MG tablet 1/5/2021 Active   benzonatate (TESSALON) 200 MG capsule Not Taking Active   carvedilol (COREG) 25 MG Tab 1/6/2021 Active   fluticasone (FLONASE) 50 MCG/ACT nasal spray 1/6/2021 Active   levothyroxine (SYNTHROID) 75 MCG Tab 1/6/2021 Active   lisinopril (PRINIVIL) 10 MG Tab 1/6/2021 Active   omeprazole (PRILOSEC) 40 MG delayed-release capsule 1/6/2021 Active   pravastatin (PRAVACHOL) 40 MG tablet 1/5/2021 Active                ALLERGIES  Allergies   Allergen Reactions   • Flexeril [Cyclobenzaprine Hcl]      confusion   • Other Environmental Runny Nose and Itching     Pollens       PHYSICAL EXAM  VITAL SIGNS: /74   Pulse 67   Temp 36.5 °C (97.7 °F) (Tympanic)   Resp 20   Ht 1.88 m (6' 2\")   Wt 124.5 kg (274 lb 7.6 oz)   SpO2 92%   BMI 35.24 kg/m²   Pulse ox interpretation: I interpret this pulse ox as normal.  Constitutional: Alert in no apparent distress.  HENT: Normocephalic, atraumatic. Bilateral external ears normal, Nose normal. Moist mucous membranes.    Eyes: Pupils are equal and reactive, Conjunctiva normal.   Neck: Normal range of motion, Supple  Lymphatic: No lymphadenopathy noted.   Cardiovascular: Regular rate and rhythm, no murmurs. Distal pulses intact.  No peripheral edema.  Thorax & Lungs: Clear right.  Left lower lobe rales.  No increased work of breathing, clip speech or retractions.  Abdomen: Obese, soft, non-distended, non-tender to palpation. No palpable or pulsatile masses. No peritoneal signs.   Skin: Warm, Dry, No erythema, No rash.   Musculoskeletal: Good range of motion in all major joints.  " education: None     Highest education level: None   Occupational History     Occupation: [employer]     Comment: [employer]     Employer: [employer]   Tobacco Use     Smoking status: Never Smoker     Smokeless tobacco: Never Used     Tobacco comment: no smokers in the household   Vaping Use     Vaping Use: Never used   Substance and Sexual Activity     Alcohol use: Yes     Comment: 3 to 4 drinks a week     Drug use: No     Sexual activity: Not Currently     Partners: Female     Birth control/protection: Surgical     Comment: tubal ligation   Other Topics Concern      Service No     Blood Transfusions No     Caffeine Concern No     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     Exercise Yes     Comment: 2 times a week: yoga and hockey, stretching     Bike Helmet Yes     Seat Belt Yes     Self-Exams No     Parent/sibling w/ CABG, MI or angioplasty before 65F 55M? No     Family History   Problem Relation Age of Onset     Lipids Father         medication     Coronary Artery Disease Father      Hypertension Father         medicaton     Hyperlipidemia Father      Arthritis Mother         rheumatoid     Arthritis Sister      Asthma No family hx of      C.A.D. No family hx of      Diabetes No family hx of      Cerebrovascular Disease No family hx of      Breast Cancer No family hx of      Cancer - colorectal No family hx of      Prostate Cancer No family hx of      Cancer No family hx of      Blood Disease No family hx of      Alzheimer Disease No family hx of      Cardiovascular No family hx of      Circulatory No family hx of      Eye Disorder No family hx of      Gastrointestinal Disease No family hx of      Genitourinary Problems No family hx of      Heart Disease No family hx of       Musculoskeletal Disorder No family hx of      Neurologic Disorder No family hx of      Respiratory No family hx of      Thyroid Disease No family hx of        ALLERGIES:  Oxycontin [oxycodone hydrochloride-polysorbate 80]    Current Outpatient Medications   Medication     Cholecalciferol (VITAMIN D3 PO)     HUMIRA PEN 40 MG/0.8ML pen kit     MAGNESIUM PO     Omega-3 Fatty Acids (FISH OIL) 1000 MG CPDR     order for DME     oxybutynin ER (DITROPAN-XL) 10 MG 24 hr tablet     predniSONE (DELTASONE) 5 MG tablet     Zinc 15 MG CAPS     fluticasone (FLONASE) 50 MCG/ACT nasal spray     ibuprofen (ADVIL/MOTRIN) 200 MG capsule     No current facility-administered medications for this visit.         ROS:  ROS is done and is negative for general/constitutional, eye, ENT, Respiratory, cardiovascular, GI, , Skin, musculoskeletal except as noted elsewhere.  All other review of systems negative except as noted elsewhere.      OBJECTIVE:  /85   Pulse 58   Temp 97.3  F (36.3  C) (Tympanic)   Wt 80.3 kg (177 lb)   SpO2 98%   BMI 27.62 kg/m    GENERAL APPEARANCE: Alert, in no acute distress  EYES: normal  NOSE:normal  OROPHARYNX:normal  NECK:No adenopathy,masses or thyromegaly  RESP: normal and clear to auscultation  CV:regular rate and rhythm and no murmurs, clicks, or gallops  ABDOMEN: Abdomen soft.   BS normal. Moderate tenderness in lower part of right lower quadrant which worsens with flexing abdominal muscles and a small bulge palpable when muscles tightened that resolves when relaxes.  No abdominal wall defect palpated. No masses, organomegaly  SKIN: no ulcers, lesions or rash  MUSCULOSKELETAL:Musculoskeletal normal      RESULTS  No results found for any visits on 07/12/22..  No results found for this or any previous visit (from the past 48 hour(s)).    ASSESSMENT/PLAN:    ASSESSMENT / PLAN:  (R10.31) Abdominal pain, right lower quadrant  (primary encounter diagnosis)  Comment: pt's hx and exam are concerning    Neurologic: Alert and orient x4.  Speech clear and cohesive.  Ambulates independently.  Psychiatric: Affect normal, Judgment normal, Mood normal.       DIAGNOSTIC STUDIES / PROCEDURES    LABS  Results for orders placed or performed during the hospital encounter of 01/06/21   CBC w/ Differential   Result Value Ref Range    WBC 8.3 4.8 - 10.8 K/uL    RBC 4.52 (L) 4.70 - 6.10 M/uL    Hemoglobin 14.5 14.0 - 18.0 g/dL    Hematocrit 43.5 42.0 - 52.0 %    MCV 96.2 81.4 - 97.8 fL    MCH 32.1 27.0 - 33.0 pg    MCHC 33.3 (L) 33.7 - 35.3 g/dL    RDW 46.9 35.9 - 50.0 fL    Platelet Count 209 164 - 446 K/uL    MPV 9.2 9.0 - 12.9 fL    Neutrophils-Polys 68.80 44.00 - 72.00 %    Lymphocytes 15.30 (L) 22.00 - 41.00 %    Monocytes 9.70 0.00 - 13.40 %    Eosinophils 5.00 0.00 - 6.90 %    Basophils 0.80 0.00 - 1.80 %    Immature Granulocytes 0.40 0.00 - 0.90 %    Nucleated RBC 0.00 /100 WBC    Neutrophils (Absolute) 5.69 1.82 - 7.42 K/uL    Lymphs (Absolute) 1.26 1.00 - 4.80 K/uL    Monos (Absolute) 0.80 0.00 - 0.85 K/uL    Eos (Absolute) 0.41 0.00 - 0.51 K/uL    Baso (Absolute) 0.07 0.00 - 0.12 K/uL    Immature Granulocytes (abs) 0.03 0.00 - 0.11 K/uL    NRBC (Absolute) 0.00 K/uL   Complete Metabolic Panel (CMP)   Result Value Ref Range    Sodium 139 135 - 145 mmol/L    Potassium 4.3 3.6 - 5.5 mmol/L    Chloride 104 96 - 112 mmol/L    Co2 23 20 - 33 mmol/L    Anion Gap 12.0 7.0 - 16.0    Glucose 111 (H) 65 - 99 mg/dL    Bun 15 8 - 22 mg/dL    Creatinine 1.07 0.50 - 1.40 mg/dL    Calcium 9.0 8.4 - 10.2 mg/dL    AST(SGOT) 15 12 - 45 U/L    ALT(SGPT) 15 2 - 50 U/L    Alkaline Phosphatase 102 (H) 30 - 99 U/L    Total Bilirubin 0.5 0.1 - 1.5 mg/dL    Albumin 3.4 3.2 - 4.9 g/dL    Total Protein 6.6 6.0 - 8.2 g/dL    Globulin 3.2 1.9 - 3.5 g/dL    A-G Ratio 1.1 g/dL   Lipase   Result Value Ref Range    Lipase 28 7 - 58 U/L   COVID/SARS CoV-2 PCR    Specimen: Nasopharyngeal; Respirate   Result Value Ref Range    COVID Order Status Received  "   SARS-CoV-2, PCR (In-House)   Result Value Ref Range    SARS-CoV-2 Source NP Swab    ESTIMATED GFR   Result Value Ref Range    GFR If African American >60 >60 mL/min/1.73 m 2    GFR If Non African American >60 >60 mL/min/1.73 m 2     RADIOLOGY  CT-CHEST (THORAX) WITH   Final Result      1.  Moderate to large left pleural effusion, low density. Extension into the anterior left hemithorax suggests some degree of loculation. Adjacent airspace disease is likely compressive atelectasis. Superimposed pneumonia cannot be excluded.      2.  Atherosclerosis.      3.  Peripheral reticulation with faint groundglass density in mild right lower lobe atelectasis. Findings are consistent with interstitial lung disease.                   COURSE & MEDICAL DECISION MAKING  Nursing notes and vital signs were reviewed. (See chart for details)  The patients records were reviewed, history was obtained from the patient;     Medical record review: 2 view chest x-ray yesterday 1/5/2021 with \"large left pleural effusion with left basilar atelectasis/consolidation.\"    Patient has been seen evaluated at bedside.  No acute respiratory distress, hemodynamically stable without hypoxia.  He is not requiring oxygen.  X-ray does demonstrate large left pleural effusion, he describes more than 2 months of progressive cough, now productive and shortness of breath.  Add labs, CT for further evaluation.      CT confirms large left-sided pleural effusion with some degree of loculation.  Cannot exclude superimposed pneumonia. No leukocytosis, left shift or bandemia.  No electrolyte derangement.  He is hemodynamically stable without fever, tachycardia, hypotension or hypoxia.  Antibiotics per protocol for me acquired pneumonia have been initiated.  Covid pending, although given duration of symptoms it seems less likely.  He will be hospitalized for further evaluation and treatment.    1145 -hospitalist paged.    12:11 PM Dr. Del Cid is aware the patient " for possible hernia  Plan: Adult General Surg Referral        Refer to surgeon for further evaluation and treatment. I reviewed supportive care, otc meds to use if needed, expected course, and signs of concern.  Follow up with surgeon within 1 week(s) or sooner if worsens in any way.  Reviewed red flag symptoms and is to go to the ER if experiences any of these.    (Z71.1) Concern about hernia without diagnosis  Comment: pts sxs and exam concerning for possible hernia  Plan: Adult General Surg Referral              Refer to surgeon for further evaluation and treatment. I reviewed supportive care, otc meds to use if needed, expected course, and signs of concern.  Follow up with surgeon within 1 week(s) or sooner if worsens in any way.  Reviewed red flag symptoms and is to go to the ER if experiences any of these.      See [facility] for orders, medications, letters, patient instructions    Becki Hudson M.D.     agreeable to consultation.  Agrees with continue IV antibiotics, thoracentesis later if no resolution.    FINAL IMPRESSION  (J90) Pleural effusion on left  (R06.00) Dyspnea, unspecified type  (J90) Loculated pleural effusion      Electronically signed by: Shaina Leon D.O., 1/6/2021 9:41 AM      This dictation was created using voice recognition software. The accuracy of the dictation is limited to the abilities of the software. I expect there may be some errors of grammar and possibly content. The nursing notes were reviewed and certain aspects of this information were incorporated into this note.

## 2022-07-13 ENCOUNTER — OFFICE VISIT (OUTPATIENT)
Dept: SURGERY | Facility: OTHER | Age: 62
End: 2022-07-13
Payer: COMMERCIAL

## 2022-07-13 ENCOUNTER — HOSPITAL ENCOUNTER (OUTPATIENT)
Dept: ULTRASOUND IMAGING | Facility: CLINIC | Age: 62
Discharge: HOME | End: 2022-07-13
Attending: SURGERY | Admitting: SURGERY
Payer: COMMERCIAL

## 2022-07-13 VITALS
BODY MASS INDEX: 27.78 KG/M2 | SYSTOLIC BLOOD PRESSURE: 124 MMHG | WEIGHT: 177 LBS | DIASTOLIC BLOOD PRESSURE: 76 MMHG | TEMPERATURE: 98.2 F | HEIGHT: 67 IN

## 2022-07-13 DIAGNOSIS — R10.32 LEFT GROIN PAIN: Primary | ICD-10-CM

## 2022-07-13 DIAGNOSIS — K40.90 LEFT INGUINAL HERNIA: ICD-10-CM

## 2022-07-13 DIAGNOSIS — R10.32 LEFT GROIN PAIN: ICD-10-CM

## 2022-07-13 DIAGNOSIS — R10.31 ABDOMINAL PAIN, RIGHT LOWER QUADRANT: ICD-10-CM

## 2022-07-13 DIAGNOSIS — Z71.1 CONCERN ABOUT HERNIA WITHOUT DIAGNOSIS: ICD-10-CM

## 2022-07-13 PROCEDURE — 76705 ECHO EXAM OF ABDOMEN: CPT

## 2022-07-13 PROCEDURE — 99243 OFF/OP CNSLTJ NEW/EST LOW 30: CPT | Performed by: SURGERY

## 2022-07-13 ASSESSMENT — PAIN SCALES - GENERAL: PAINLEVEL: MODERATE PAIN (5)

## 2022-07-13 NOTE — LETTER
7/13/2022         RE: Jaskaran Car  [address] [facility] [address]        Dear Colleague,    Thank you for referring your patient, Jaskaran Car, to the [facility]. Please see a copy of my visit note below.    Patient seen in consultation for possible hernia    HPI:  Patient is a 62 year old male with injury to left groin 2 months ago while lifting weights.  Since that time he has periodic discomfort especially when laying down and getting up out of bed.  He has drastically reduced his exercise regimen to avoid further injury.  His only abdominal surgery is laparoscopic cholecystectomy.  He has not had any imaging of this yet.  He denies problems with defecation or urination.  He has no obstructive symptoms.  He denies ever feeling of palpable bulge.    Review Of Systems    Skin: negative  Ears/Nose/Throat: negative  Respiratory: No shortness of breath, dyspnea on exertion, cough, or hemoptysis  Cardiovascular: negative  Gastrointestinal: negative  Genitourinary: negative  Musculoskeletal: negative  Neurologic: negative  Hematologic/Lymphatic/Immunologic: negative  Endocrine: negative      Past Medical History:   Diagnosis Date     Arthritis     psoriatic     Chondromalacia of patella      Displacement of lumbar intervertebral disc without myelopathy 10/02    L4/5 disc herniation with L4 nerve root impingement bilaterally     External hemorrhoids      Leukopenia 5/1/2013     Leukopenia      Obstructive sleep apnea- CPAP at 5 cm water 2/23/2012     Right ankle pain June 2006    MRI Nov 2006 or so     Right knee pain summer 2007    MRI Nov 2008     Thrombocytopaenia 6/12/2013     Thrombocytopenia (H)        Past Surgical History:   Procedure Laterality Date     ABDOMEN SURGERY       ARTHROSCOPY KNEE  8/30/2012    Procedure: ARTHROSCOPY KNEE;  Left knee arthroscopy, debridement and synovial biopsy;  Surgeon: Ivan Wiggins MD;  Location:  OR      CHOLECYSTECTOMY, LAPOROSCOPIC  12/21/2006     COLONOSCOPY  09/30/08    neg, recheck in 10 years     COLONOSCOPY N/A 9/30/2019    Procedure: COLONOSCOPY;  Surgeon: Patrick Garcia MD;  Location:  GI     EYE SURGERY       HC UGI ENDOSCOPY DIAG W OR W/O BRUSH/WASH  12/11/06    normal     KNEE SURGERY       ZZC STRESS TEST - REGULAR ([place])  2006    negative stress thallium       Family History   Problem Relation Age of Onset     Lipids Father         medication     Coronary Artery Disease Father      Hypertension Father         medicaton     Hyperlipidemia Father      Arthritis Mother         rheumatoid     Arthritis Sister      Asthma No family hx of      C.A.D. No family hx of      Diabetes No family hx of      Cerebrovascular Disease No family hx of      Breast Cancer No family hx of      Cancer - colorectal No family hx of      Prostate Cancer No family hx of      Cancer No family hx of      Blood Disease No family hx of      Alzheimer Disease No family hx of      Cardiovascular No family hx of      Circulatory No family hx of      Eye Disorder No family hx of      Gastrointestinal Disease No family hx of      Genitourinary Problems No family hx of      Heart Disease No family hx of      Musculoskeletal Disorder No family hx of      Neurologic Disorder No family hx of      Respiratory No family hx of      Thyroid Disease No family hx of        Social History     Socioeconomic History     Marital status:      Spouse name: Reshma     Number of children: 2     Years of education: Not on file     Highest education level: Not on file   Occupational History     Occupation: maintance     Comment: [employer]     Employer: [employer]   Tobacco Use     Smoking status: Never Smoker     Smokeless tobacco: Never Used     Tobacco comment: no smokers in the household   Vaping Use     Vaping Use: Never used   Substance and Sexual Activity     Alcohol use: Yes     Comment: 3 to 4 drinks a week     Drug use: No      "Sexual activity: Not Currently     Partners: Female     Birth control/protection: Surgical     Comment: tubal ligation   Other Topics Concern      Service No     Blood Transfusions No     Caffeine Concern No     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     Exercise Yes     Comment: 2 times a week: yoga and hockey, stretching     Bike Helmet Yes     Seat Belt Yes     Self-Exams No     Parent/sibling w/ CABG, MI or angioplasty before 65F 55M? No   Social History Narrative     Not on file     Social Determinants of Health     Financial Resource Strain: Not on file   Food Insecurity: Not on file   Transportation Needs: Not on file   Physical Activity: Not on file   Stress: Not on file   Social Connections: Not on file   Intimate Partner Violence: Not on file   Housing Stability: Not on file       Current Outpatient Medications   Medication Sig Dispense Refill     Cholecalciferol (VITAMIN D3 PO) Take 5,000 Units by mouth daily       HUMIRA PEN 40 MG/0.8ML pen kit every 14 days  0     ibuprofen (ADVIL/MOTRIN) 200 MG capsule Take 4 capsules (800 mg) by mouth every 8 hours as needed for inflammatory pain 20 capsule 0     MAGNESIUM PO Take by mouth daily       Omega-3 Fatty Acids (FISH OIL) 1000 MG CPDR Take  by mouth daily.       order for DME Equipment ordered: RESMED Auto PAP Mask type: Nasal Settings: 5-10cm H2O       oxybutynin ER (DITROPAN-XL) 10 MG 24 hr tablet TAKE 1 TABLET DAILY 90 tablet 3     predniSONE (DELTASONE) 5 MG tablet Take 5 mg by mouth daily  0     Zinc 15 MG CAPS Take 1 tablet by mouth daily 30 capsule        Medications and history reviewed    Physical exam:  Vitals: /76   Temp 98.2  F (36.8  C) (Temporal)   Ht 1.705 m (5' 7.13\")   Wt 80.3 kg (177 lb)   BMI " 27.61 kg/m    BMI= Body mass index is 27.61 kg/m .    Constitutional: Healthy, alert, non-distressed   Head: Normo-cephalic, atraumatic, no lesions, masses or tenderness   Cardiovascular: RRR, no new murmurs, +S1, +S2 heart sounds, no clicks, rubs or gallops   Respiratory: CTAB, no rales, rhonchi or wheezing, equal chest rise, good respiratory effort   Gastrointestinal: Soft, non-tender, non distended, no rebound rigidity or guarding, no masses or hernias palpated   Groin: Left inguinal canal with subtle protrusion with valsalva, unable to say with certainty that this is a hernia, Right groin without bulge.  : Deferred  Musculoskeletal: Moves all extremities, normal  strength, no deformities noted   Skin: No suspicious lesions or rashes   Psychiatric: Mentation appears normal, affect appropriate   Hematologic/Lymphatic/Immunologic: Normal cervical and supraclavicular lymph nodes   Patient able to get up on table without difficulty.    Labs show:  No results found for this or any previous visit (from the past 24 hour(s)).    Imaging shows:  No results found for this or any previous visit (from the past 744 hour(s)).     Assessment:     ICD-10-CM    1. Left groin pain  R10.32 US Hernia Evaluation   2. Concern about hernia without diagnosis  Z71.1 Adult General Surg Referral   3. Abdominal pain, right lower quadrant  R10.31 Adult General Surg Referral     Plan: I recommend further evaluation with ultrasound hernia evaluation of both groins.  If this does demonstrate hernia we will discuss possible surgical options.  If it does not; continued observation will likely be recommended.  Could attempt physical therapy if he desires.    30 minutes spent on the date of the encounter doing chart review, history and exam, documentation and further activities per the note    Jem Lucero, DO        Again, thank you for allowing me to participate in the care of your patient.        Sincerely,        Jem MICHEL  Jazmine, DO

## 2022-07-13 NOTE — PROGRESS NOTES
Patient seen in consultation for possible hernia    HPI:  Patient is a 62 year old male with injury to left groin 2 months ago while lifting weights.  Since that time he has periodic discomfort especially when laying down and getting up out of bed.  He has drastically reduced his exercise regimen to avoid further injury.  His only abdominal surgery is laparoscopic cholecystectomy.  He has not had any imaging of this yet.  He denies problems with defecation or urination.  He has no obstructive symptoms.  He denies ever feeling of palpable bulge.    Review Of Systems    Skin: negative  Ears/Nose/Throat: negative  Respiratory: No shortness of breath, dyspnea on exertion, cough, or hemoptysis  Cardiovascular: negative  Gastrointestinal: negative  Genitourinary: negative  Musculoskeletal: negative  Neurologic: negative  Hematologic/Lymphatic/Immunologic: negative  Endocrine: negative      Past Medical History:   Diagnosis Date     Arthritis     psoriatic     Chondromalacia of patella      Displacement of lumbar intervertebral disc without myelopathy 10/02    L4/5 disc herniation with L4 nerve root impingement bilaterally     External hemorrhoids      Leukopenia 5/1/2013     Leukopenia      Obstructive sleep apnea- CPAP at 5 cm water 2/23/2012     Right ankle pain June 2006    MRI Nov 2006 or so     Right knee pain summer 2007    MRI Nov 2008     Thrombocytopaenia 6/12/2013     Thrombocytopenia (H)        Past Surgical History:   Procedure Laterality Date     ABDOMEN SURGERY       ARTHROSCOPY KNEE  8/30/2012    Procedure: ARTHROSCOPY KNEE;  Left knee arthroscopy, debridement and synovial biopsy;  Surgeon: Ivan Wiggins MD;  Location: [facility] OR     CHOLECYSTECTOMY, LAPOROSCOPIC  12/21/2006     COLONOSCOPY  09/30/08    neg, recheck in 10 years     COLONOSCOPY N/A 9/30/2019    Procedure: COLONOSCOPY;  Surgeon: Patrick Garcia MD;  Location:  GI     EYE SURGERY        UGI ENDOSCOPY DIAG W OR W/O  BRUSH/WASH  12/11/06    normal     KNEE SURGERY       ZZC STRESS TEST - REGULAR ([place])  2006    negative stress thallium       Family History   Problem Relation Age of Onset     Lipids Father         medication     Coronary Artery Disease Father      Hypertension Father         medicaton     Hyperlipidemia Father      Arthritis Mother         rheumatoid     Arthritis Sister      Asthma No family hx of      C.A.D. No family hx of      Diabetes No family hx of      Cerebrovascular Disease No family hx of      Breast Cancer No family hx of      Cancer - colorectal No family hx of      Prostate Cancer No family hx of      Cancer No family hx of      Blood Disease No family hx of      Alzheimer Disease No family hx of      Cardiovascular No family hx of      Circulatory No family hx of      Eye Disorder No family hx of      Gastrointestinal Disease No family hx of      Genitourinary Problems No family hx of      Heart Disease No family hx of      Musculoskeletal Disorder No family hx of      Neurologic Disorder No family hx of      Respiratory No family hx of      Thyroid Disease No family hx of        Social History     Socioeconomic History     Marital status:      Spouse name: Reshma     Number of children: 2     Years of education: Not on file     Highest education level: Not on file   Occupational History     Occupation: [email or web address]     Comment: [employer]     Employer: [employer]   Tobacco Use     Smoking status: Never Smoker     Smokeless tobacco: Never Used     Tobacco comment: no smokers in the household   Vaping Use     Vaping Use: Never used   Substance and Sexual Activity     Alcohol use: Yes     Comment: 3 to 4 drinks a week     Drug use: No     Sexual activity: Not Currently     Partners: Female     Birth control/protection: Surgical     Comment: tubal ligation   Other Topics Concern      Service No     Blood Transfusions No     Caffeine Concern No     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     Exercise Yes     Comment: 2 times a week: yoga and hockey, stretching     Bike Helmet Yes     Seat Belt Yes     Self-Exams No     Parent/sibling w/ CABG, MI or angioplasty before 65F 55M? No   Social History Narrative     Not on file     Social Determinants of Health     Financial Resource Strain: Not on file   Food Insecurity: Not on file   Transportation Needs: Not on file   Physical Activity: Not on file   Stress: Not on file   Social Connections: Not on file   Intimate Partner Violence: Not on file   Housing Stability: Not on file       Current Outpatient Medications   Medication Sig Dispense Refill     Cholecalciferol (VITAMIN D3 PO) Take 5,000 Units by mouth daily       HUMIRA PEN 40 MG/0.8ML pen kit every 14 days  0     ibuprofen (ADVIL/MOTRIN) 200 MG capsule Take 4 capsules (800 mg) by mouth every 8 hours as needed for inflammatory pain 20 capsule 0     MAGNESIUM PO Take by mouth daily       Omega-3 Fatty Acids (FISH OIL) 1000 MG CPDR Take  by mouth daily.       order for DME Equipment ordered: RESMED Auto PAP Mask type: Nasal Settings: 5-10cm H2O       oxybutynin ER (DITROPAN-XL) 10 MG 24 hr tablet TAKE 1 TABLET DAILY 90 tablet 3     predniSONE (DELTASONE) 5 MG tablet Take 5 mg by mouth daily  0     Zinc 15 MG CAPS Take 1 tablet by mouth daily 30 capsule        Medications and history reviewed    Physical exam:  Vitals: /76   Temp 98.2  F (36.8  C) (Temporal)   Ht 1.705 m (5' 7.13\")   Wt 80.3 kg (177 lb)   BMI 27.61 kg/m    BMI= Body mass index is 27.61 kg/m .    Constitutional: Healthy, alert, non-distressed   Head: Normo-cephalic, atraumatic, no lesions, masses or tenderness   Cardiovascular: RRR, no new murmurs, +S1, +S2 heart sounds, no clicks, rubs or gallops "   Respiratory: CTAB, no rales, rhonchi or wheezing, equal chest rise, good respiratory effort   Gastrointestinal: Soft, non-tender, non distended, no rebound rigidity or guarding, no masses or hernias palpated   Groin: Left inguinal canal with subtle protrusion with valsalva, unable to say with certainty that this is a hernia, Right groin without bulge.  : Deferred  Musculoskeletal: Moves all extremities, normal  strength, no deformities noted   Skin: No suspicious lesions or rashes   Psychiatric: Mentation appears normal, affect appropriate   Hematologic/Lymphatic/Immunologic: Normal cervical and supraclavicular lymph nodes   Patient able to get up on table without difficulty.    Labs show:  No results found for this or any previous visit (from the past 24 hour(s)).    Imaging shows:  No results found for this or any previous visit (from the past 744 hour(s)).     Assessment:     ICD-10-CM    1. Left groin pain  R10.32 US Hernia Evaluation   2. Concern about hernia without diagnosis  Z71.1 Adult General Surg Referral   3. Abdominal pain, right lower quadrant  R10.31 Adult General Surg Referral     Plan: I recommend further evaluation with ultrasound hernia evaluation of both groins.  If this does demonstrate hernia we will discuss possible surgical options.  If it does not; continued observation will likely be recommended.  Could attempt physical therapy if he desires.    30 minutes spent on the date of the encounter doing chart review, history and exam, documentation and further activities per the note    Jem Lucero, DO

## 2022-07-20 ENCOUNTER — TELEPHONE (OUTPATIENT)
Dept: SURGERY | Facility: CLINIC | Age: 62
End: 2022-07-20

## 2022-07-20 NOTE — TELEPHONE ENCOUNTER
Type of surgery: Robot-assisted laparoscopic left inguinal hernia repair with mesh possible bilateral     Location of surgery: [facility] OR  Date and time of surgery: 8/22  Surgeon: Jazmine  Pre-Op Appt Date: 8/9  Post-Op Appt Date: 8/31   Packet sent out: Yes  Pre-cert/Authorization completed:  Not Applicable  Date: na

## 2022-08-09 ENCOUNTER — OFFICE VISIT (OUTPATIENT)
Dept: FAMILY MEDICINE | Facility: OTHER | Age: 62
End: 2022-08-09
Payer: COMMERCIAL

## 2022-08-09 VITALS
WEIGHT: 178 LBS | BODY MASS INDEX: 27.77 KG/M2 | SYSTOLIC BLOOD PRESSURE: 128 MMHG | DIASTOLIC BLOOD PRESSURE: 72 MMHG | RESPIRATION RATE: 16 BRPM | OXYGEN SATURATION: 98 % | HEART RATE: 64 BPM | TEMPERATURE: 97.6 F

## 2022-08-09 DIAGNOSIS — Z01.818 PREOP GENERAL PHYSICAL EXAM: Primary | ICD-10-CM

## 2022-08-09 DIAGNOSIS — K40.90 LEFT INGUINAL HERNIA: ICD-10-CM

## 2022-08-09 DIAGNOSIS — L40.50 PSORIATIC ARTHRITIS (H): ICD-10-CM

## 2022-08-09 LAB
ANION GAP SERPL CALCULATED.3IONS-SCNC: 3 MMOL/L (ref 3–14)
BUN SERPL-MCNC: 23 MG/DL (ref 7–30)
CALCIUM SERPL-MCNC: 9.1 MG/DL (ref 8.5–10.1)
CHLORIDE BLD-SCNC: 106 MMOL/L (ref 94–109)
CO2 SERPL-SCNC: 29 MMOL/L (ref 20–32)
CREAT SERPL-MCNC: 1.07 MG/DL (ref 0.66–1.25)
ERYTHROCYTE [DISTWIDTH] IN BLOOD BY AUTOMATED COUNT: 11.3 % (ref 10–15)
GFR SERPL CREATININE-BSD FRML MDRD: 78 ML/MIN/1.73M2
GLUCOSE BLD-MCNC: 93 MG/DL (ref 70–99)
HCT VFR BLD AUTO: 41.2 % (ref 40–53)
HGB BLD-MCNC: 14.8 G/DL (ref 13.3–17.7)
MCH RBC QN AUTO: 33.8 PG (ref 26.5–33)
MCHC RBC AUTO-ENTMCNC: 35.9 G/DL (ref 31.5–36.5)
MCV RBC AUTO: 94 FL (ref 78–100)
PLATELET # BLD AUTO: 144 10E3/UL (ref 150–450)
POTASSIUM BLD-SCNC: 3.7 MMOL/L (ref 3.4–5.3)
RBC # BLD AUTO: 4.38 10E6/UL (ref 4.4–5.9)
SODIUM SERPL-SCNC: 138 MMOL/L (ref 133–144)
WBC # BLD AUTO: 5.6 10E3/UL (ref 4–11)

## 2022-08-09 PROCEDURE — 80048 BASIC METABOLIC PNL TOTAL CA: CPT | Performed by: PHYSICIAN ASSISTANT

## 2022-08-09 PROCEDURE — 85027 COMPLETE CBC AUTOMATED: CPT | Performed by: PHYSICIAN ASSISTANT

## 2022-08-09 PROCEDURE — 99214 OFFICE O/P EST MOD 30 MIN: CPT | Performed by: PHYSICIAN ASSISTANT

## 2022-08-09 PROCEDURE — 36415 COLL VENOUS BLD VENIPUNCTURE: CPT | Performed by: PHYSICIAN ASSISTANT

## 2022-08-09 ASSESSMENT — PAIN SCALES - GENERAL: PAINLEVEL: NO PAIN (0)

## 2022-08-09 NOTE — PROGRESS NOTES
[address]  [facility] [address]  Phone: [phone]  Primary Provider: Becki Fernandez  Pre-op Performing Provider: TAMI ANDREWS    PREOPERATIVE EVALUATION:  Today's date: 8/9/2022    Jaskaran Car is a 62 year old male who presents for a preoperative evaluation.    Surgical Information:  Surgery/Procedure: Robot-assisted laparoscopic left inguinal hernia repair with mesh possible bilateral  Surgery Location: [place]  Surgeon: Jem Lucero  Surgery Date: 8/22/22  Time of Surgery: 7:30am  Where patient plans to recover: at home   Fax number for surgical facility: Note does not need to be faxed, will be available electronically in Epic.    Type of Anesthesia Anticipated: General    Assessment & Plan     The proposed surgical procedure is considered INTERMEDIATE risk.    Preop general physical exam  Left inguinal hernia  Patient was instructed on prep for upcoming procedure. He was given a copy of these instructions with his AVS. Labs returned grossly unremarkable with slightly below normal platelets. However, this appears to be stable.   - CBC with platelets; Future  - Basic metabolic panel  (Ca, Cl, CO2, Creat, Gluc, K, Na, BUN); Future  - CBC with platelets  - Basic metabolic panel  (Ca, Cl, CO2, Creat, Gluc, K, Na, BUN)    Psoriatic arthritis (H)  Last dose of humira will be on 08/10. It is recommended that the patient schedule the procedure 2 weeks from the humira dose. This will be just shy of 2 weeks, but should be acceptable. Patient will work with surgeon to ensure that he is healing appropriately prior to restarting. General recommendation is to wait 2 weeks prior to restart.      Risks and Recommendations:  The patient has the following additional risks and recommendations for perioperative complications:   - No identified additional risk factors other than previously addressed    Medication Instructions:  Adalimumab (usual dosing  is as frequent as Q7 days; typical maintenance dosing is every 7-14 days but may vary)  Schedule surgery to be at least 2 weeks after the last dose.  Resume at a minimum 14 days after surgery is completed and in the absence of wound healing problems, surgical site infection, and systemic infection, and only after given approval to restart by the operating surgeon or provider administering post-operative follow up evaluation.       - ibuprofen (Advil, Motrin): HOLD 1 day before surgery.     RECOMMENDATION:  APPROVAL GIVEN to proceed with proposed procedure, without further diagnostic evaluation.    Subjective     HPI related to upcoming procedure:   Scheduled for left inguinal hernia repair on 08/22 with general surgeon.     Preop Questions 8/2/2022   1. Have you ever had a heart attack or stroke? No   2. Have you ever had surgery on your heart or blood vessels, such as a stent placement, a coronary artery bypass, or surgery on an artery in your head, neck, heart, or legs? No   3. Do you have chest pain with activity? No   4. Do you have a history of  heart failure? No   5. Do you currently have a cold, bronchitis or symptoms of other infection? No   6. Do you have a cough, shortness of breath, or wheezing? No   7. Do you or anyone in your family have previous history of blood clots? No   8. Do you or does anyone in your family have a serious bleeding problem such as prolonged bleeding following surgeries or cuts? No   9. Have you ever had problems with anemia or been told to take iron pills? No   10. Have you had any abnormal blood loss such as black, tarry or bloody stools? No   11. Have you ever had a blood transfusion? No   12. Are you willing to have a blood transfusion if it is medically needed before, during, or after your surgery? Yes   13. Have you or any of your relatives ever had problems with anesthesia? No   14. Do you have sleep apnea, excessive snoring or daytime drowsiness? YES - has CPAP   14a. Do  you have a CPAP machine? Yes   15. Do you have any artifical heart valves or other implanted medical devices like a pacemaker, defibrillator, or continuous glucose monitor? No   16. Do you have artificial joints? No   17. Are you allergic to latex? No       Health Care Directive:  Patient does not have a Health Care Directive or Living Will: Discussed advance care planning with patient; information given to patient to review.    Preoperative Review of :   reviewed - no record of controlled substances prescribed.    Status of Chronic Conditions:  See problem list for active medical problems.  Problems all longstanding and stable, except as noted/documented.  See ROS for pertinent symptoms related to these conditions.      Review of Systems  Constitutional, neuro, ENT, endocrine, pulmonary, cardiac, gastrointestinal, genitourinary, musculoskeletal, integument and psychiatric systems are negative, except as otherwise noted.    Patient Active Problem List    Diagnosis Date Noted     High risk medication use 06/12/2013     Priority: High     Thrombocytopenia (H) 06/12/2013     Priority: High     Diagnosis updated by automated process. Provider to review and confirm.       Psoriatic arthritis (H) 12/21/2012     Priority: High     Acute pharyngitis 08/12/2015     Priority: Medium     Impaired fasting glucose 07/13/2015     Priority: Medium     Hyperlipidemia with target LDL less than 130 07/08/2013     Priority: Medium     Diagnosis updated by automated process. Provider to review and confirm.       High risk medications (not anticoagulants) long-term use 12/21/2012     Priority: Medium     Inverse psoriasis 06/26/2012     Priority: Medium     Pain in joint, ankle and foot 04/18/2012     Priority: Medium     Obstructive sleep apnea- CPAP at 5 cm water 02/23/2012     Priority: Medium     Patellar tendinitis 09/20/2011     Priority: Medium     Knee pain 09/20/2011     Priority: Medium     Advanced directives,  counseling/discussion 06/09/2011     Priority: Medium     Health care directive mailed to patient. Recommended that he makes a copy for his medical record.        Benign prostatic hyperplasia with urinary frequency 03/17/2008     Priority: Medium     Cholecystitis 11/28/2006     Priority: Medium     Problem list name updated by automated process. Provider to review       Displacement of lumbar intervertebral disc without myelopathy 12/23/2002     Priority: Medium     L4/5        Past Medical History:   Diagnosis Date     Arthritis     psoriatic     Chondromalacia of patella      Displacement of lumbar intervertebral disc without myelopathy 10/02    L4/5 disc herniation with L4 nerve root impingement bilaterally     External hemorrhoids      Leukopenia 5/1/2013     Leukopenia      Obstructive sleep apnea- CPAP at 5 cm water 2/23/2012     Right ankle pain June 2006    MRI Nov 2006 or so     Right knee pain summer 2007    MRI Nov 2008     Thrombocytopaenia 6/12/2013     Thrombocytopenia (H)      Past Surgical History:   Procedure Laterality Date     ABDOMEN SURGERY       ARTHROSCOPY KNEE  8/30/2012    Procedure: ARTHROSCOPY KNEE;  Left knee arthroscopy, debridement and synovial biopsy;  Surgeon: Ivan Wiggins MD;  Location: [facility] OR     CHOLECYSTECTOMY, LAPOROSCOPIC  12/21/2006     COLONOSCOPY  09/30/08    neg, recheck in 10 years     COLONOSCOPY N/A 9/30/2019    Procedure: COLONOSCOPY;  Surgeon: Patrick Garcia MD;  Location:  GI     EYE SURGERY        UGI ENDOSCOPY DIAG W OR W/O BRUSH/WASH  12/11/06    normal     KNEE SURGERY       ZZC STRESS TEST - REGULAR ([place])  2006    negative stress thallium     Current Outpatient Medications   Medication Sig Dispense Refill     Cholecalciferol (VITAMIN D3 PO) Take 5,000 Units by mouth daily       HUMIRA PEN 40 MG/0.8ML pen kit every 14 days  0     ibuprofen (ADVIL/MOTRIN) 200 MG capsule Take 4 capsules (800 mg) by mouth every 8 hours as needed for  inflammatory pain 20 capsule 0     MAGNESIUM PO Take by mouth daily       Omega-3 Fatty Acids (FISH OIL) 1000 MG CPDR Take  by mouth daily.       order for DME Equipment ordered: RESMED Auto PAP Mask type: Nasal Settings: 5-10cm H2O       oxybutynin ER (DITROPAN-XL) 10 MG 24 hr tablet TAKE 1 TABLET DAILY 90 tablet 3     predniSONE (DELTASONE) 5 MG tablet Take 5 mg by mouth daily  0     Zinc 15 MG CAPS Take 1 tablet by mouth daily 30 capsule        Allergies   Allergen Reactions     Oxycontin [Oxycodone Hydrochloride-Polysorbate 80] Itching        Social History     Tobacco Use     Smoking status: Never Smoker     Smokeless tobacco: Never Used     Tobacco comment: no smokers in the household   Substance Use Topics     Alcohol use: Yes     Comment: 3 to 4 drinks a week       History   Drug Use No         Objective     /72   Pulse 64   Temp 97.6  F (36.4  C) (Temporal)   Resp 16   Wt 80.7 kg (178 lb)   SpO2 98%   BMI 27.77 kg/m      Physical Exam    GENERAL APPEARANCE: healthy, alert and no distress     EYES: EOMI,  PERRL     HENT: ear canals and TM's normal and nose and mouth without ulcers or lesions     NECK: no adenopathy, no asymmetry, masses, or scars and thyroid normal to palpation     RESP: lungs clear to auscultation - no rales, rhonchi or wheezes     CV: regular rates and rhythm, normal S1 S2, no S3 or S4 and no murmur, click or rub     ABDOMEN:  soft, nontender, no HSM or masses and bowel sounds normal     MS: extremities normal- no gross deformities noted, no evidence of inflammation in joints, FROM in all extremities.     NEURO: Normal strength and tone, sensory exam grossly normal, mentation intact and speech normal     PSYCH: mentation appears normal. and affect normal/bright     LYMPHATICS: No cervical adenopathy    Recent Labs   Lab Test 01/05/22  0758 02/12/21  0758   HGB 14.8 15.9   * 144*    139   POTASSIUM 3.8 4.8   CR 1.04 1.10        Diagnostics:  Recent Results (from the  past 48 hour(s))   CBC with platelets    Collection Time: 08/09/22  4:17 PM   Result Value Ref Range    WBC Count 5.6 4.0 - 11.0 10e3/uL    RBC Count 4.38 (L) 4.40 - 5.90 10e6/uL    Hemoglobin 14.8 13.3 - 17.7 g/dL    Hematocrit 41.2 40.0 - 53.0 %    MCV 94 78 - 100 fL    MCH 33.8 (H) 26.5 - 33.0 pg    MCHC 35.9 31.5 - 36.5 g/dL    RDW 11.3 10.0 - 15.0 %    Platelet Count 144 (L) 150 - 450 10e3/uL   Basic metabolic panel  (Ca, Cl, CO2, Creat, Gluc, K, Na, BUN)    Collection Time: 08/09/22  4:17 PM   Result Value Ref Range    Sodium 138 133 - 144 mmol/L    Potassium 3.7 3.4 - 5.3 mmol/L    Chloride 106 94 - 109 mmol/L    Carbon Dioxide (CO2) 29 20 - 32 mmol/L    Anion Gap 3 3 - 14 mmol/L    Urea Nitrogen 23 7 - 30 mg/dL    Creatinine 1.07 0.66 - 1.25 mg/dL    Calcium 9.1 8.5 - 10.1 mg/dL    Glucose 93 70 - 99 mg/dL    GFR Estimate 78 >60 mL/min/1.73m2      No EKG required, no history of coronary heart disease, significant arrhythmia, peripheral arterial disease or other structural heart disease.    Revised Cardiac Risk Index (RCRI):  The patient has the following serious cardiovascular risks for perioperative complications:   - No serious cardiac risks = 0 points     RCRI Interpretation: 0 points: Class I (very low risk - 0.4% complication rate)           Signed Electronically by: Edward Mg PA-C  Copy of this evaluation report is provided to requesting physician.

## 2022-08-09 NOTE — PATIENT INSTRUCTIONS
Preparing for Your Surgery  Getting started  A nurse will call you to review your health history and instructions. They will give you an arrival time based on your scheduled surgery time. Please be ready to share:  Your doctor's clinic name and phone number  Your medical, surgical and anesthesia history  A list of allergies and sensitivities  A list of medicines, including herbal treatments and over-the-counter drugs  Whether the patient has a legal guardian (ask how to send us the papers in advance)  Please tell us if you're pregnant--or if there's any chance you might be pregnant. Some surgeries may injure a fetus (unborn baby), so they require a pregnancy test. Surgeries that are safe for a fetus don't always need a test, and you can choose whether to have one.   If you have a child who's having surgery, please ask for a copy of Preparing for Your Child's Surgery.    Preparing for surgery  Within 30 days of surgery: Have a pre-op exam (sometimes called an H&P, or History and Physical). This can be done at a clinic or pre-operative center.  If you're having a , you may not need this exam. Talk to your care team.  At your pre-op exam, talk to your care team about all medicines you take. If you need to stop any medicines before surgery, ask when to start taking them again.  We do this for your safety. Many medicines can make you bleed too much during surgery. Some change how well surgery (anesthesia) drugs work.  Call your insurance company to let them know you're having surgery. (If you don't have insurance, call [phone].)  Call your clinic if there's any change in your health. This includes signs of a cold or flu (sore throat, runny nose, cough, rash, fever). It also includes a scrape or scratch near the surgery site.  If you have questions on the day of surgery, call your hospital or surgery center.  COVID testing  You may need to be tested for COVID-19 before having surgery. If so, we will give  you instructions.  Eating and drinking guidelines  For your safety: Unless your surgeon tells you otherwise, follow the guidelines below.  Eat and drink as usual until 8 hours before surgery. After that, no food or milk.  Drink clear liquids until 2 hours before surgery. These are liquids you can see through, like water, Gatorade and Propel Water. You may also have black coffee and tea (no cream or milk).  Nothing by mouth within 2 hours of surgery. This includes gum, candy and breath mints.  If you drink alcohol: Stop drinking it the night before surgery.  If your care team tells you to take medicine on the morning of surgery, it's okay to take it with a sip of water.  Stop all NSAIDs (ibuprofen, advil, motrin, aleve, naproxen, aspirin, etc) 5 days prior   Preventing infection  Shower or bathe the night before and morning of your surgery. Follow the instructions your clinic gave you. (If no instructions, use regular soap.)  Don't shave or clip hair near your surgery site. We'll remove the hair if needed.  Don't smoke or vape the morning of surgery. You may chew nicotine gum up to 2 hours before surgery. A nicotine patch is okay.  Note: Some surgeries require you to completely quit smoking and nicotine. Check with your surgeon.  Your care team will make every effort to keep you safe from infection. We will:  Clean our hands often with soap and water (or an alcohol-based hand rub).  Clean the skin at your surgery site with a special soap that kills germs.  Give you a special gown to keep you warm. (Cold raises the risk of infection.)  Wear special hair covers, masks, gowns and gloves during surgery.  Give antibiotic medicine, if prescribed. Not all surgeries need antibiotics.  What to bring on the day of surgery  Photo ID and insurance card  Copy of your health care directive, if you have one  Glasses and hearing aides (bring cases)  You can't wear contacts during surgery  Inhaler and eye drops, if you use them (tell  us about these when you arrive)  CPAP machine or breathing device, if you use them  A few personal items, if spending the night  If you have . . .  A pacemaker, ICD (cardiac defibrillator) or other implant: Bring the ID card.  An implanted stimulator: Bring the remote control.  A legal guardian: Bring a copy of the certified (court-stamped) guardianship papers.  Please remove any jewelry, including body piercings. Leave jewelry and other valuables at home.  If you're going home the day of surgery  You must have a responsible adult drive you home. They should stay with you overnight as well.  If you don't have someone to stay with you, and you aren't safe to go home alone, we may keep you overnight. Insurance often won't pay for this.  After surgery  If it's hard to control your pain or you need more pain medicine, please call your surgeon's office.  Questions?   If you have any questions for your care team, list them here: _________________________________________________________________________________________________________________________________________________________________________ ____________________________________ ____________________________________ ____________________________________  For informational purposes only. Not to replace the advice of your health care provider. Copyright   2003, 2019 [facility]. All rights reserved. Clinically reviewed by Mariel Morel MD. Stockr [id number] - REV 07/21.

## 2022-08-19 ENCOUNTER — TELEPHONE (OUTPATIENT)
Dept: SURGERY | Facility: CLINIC | Age: 62
End: 2022-08-19

## 2022-08-19 NOTE — TELEPHONE ENCOUNTER
Reason for Call:  Form, our goal is to have forms completed with 72 hours, however, some forms may require a visit or additional information.    Type of letter, form or note:  FMLA    Who is the form from?: Work (if other please explain)    Where did the form come from: form was faxed in    What clinic location was the form placed at?: [facility]    Where the form was placed: Dr. Lucero Box/Folder    What number is listed as a contact on the form?: Fax: [phone] Phone: [phone]       Additional comments: NONE    Call taken on 8/19/2022 at 7:58 AM by Cami Odonnell

## 2022-08-21 ENCOUNTER — ANESTHESIA EVENT (OUTPATIENT)
Dept: SURGERY | Facility: CLINIC | Age: 62
End: 2022-08-21
Payer: COMMERCIAL

## 2022-08-21 NOTE — ANESTHESIA PREPROCEDURE EVALUATION
Anesthesia Pre-Procedure Evaluation    Patient: Jaskaran Car   MRN: [id number] : 1960        Procedure : Procedure(s):  Robot-assisted laparoscopic left inguinal hernia repair with mesh possible bilateral          Past Medical History:   Diagnosis Date     Arthritis     psoriatic     Chondromalacia of patella      Displacement of lumbar intervertebral disc without myelopathy 10/2002    L4/5 disc herniation with L4 nerve root impingement bilaterally     External hemorrhoids      Leukopenia 2013     Leukopenia      Obstructive sleep apnea- CPAP at 5 cm water 2012     Right ankle pain 2006    MRI 2006 or so     Right knee pain summer 2007    MRI 2008     Thrombocytopaenia 2013     Thrombocytopenia (H)       Past Surgical History:   Procedure Laterality Date     ABDOMEN SURGERY       ARTHROSCOPY KNEE  2012    Procedure: ARTHROSCOPY KNEE;  Left knee arthroscopy, debridement and synovial biopsy;  Surgeon: Ivan Wiggins MD;  Location:  OR     CHOLECYSTECTOMY, LAPOROSCOPIC  2006     COLONOSCOPY  2008    neg, recheck in 10 years     COLONOSCOPY N/A 2019    Procedure: COLONOSCOPY;  Surgeon: Patrick Garcia MD;  Location:  GI     EYE SURGERY        UGI ENDOSCOPY DIAG W OR W/O BRUSH/WASH  2006    normal     KNEE SURGERY       SOFT TISSUE SURGERY      FDL transfer     ZZC STRESS TEST - REGULAR ([place])  2006    negative stress thallium      Allergies   Allergen Reactions     Oxycontin [Oxycodone Hydrochloride-Polysorbate 80] Itching      Social History     Tobacco Use     Smoking status: Never Smoker     Smokeless tobacco: Never Used     Tobacco comment: no smokers in the household   Substance Use Topics     Alcohol use: Yes     Comment: 4 to 5 drinks a week      Wt Readings from Last 1 Encounters:   22 80.7 kg (178 lb)        Anesthesia Evaluation   Pt has had prior anesthetic. Type: General.    History of anesthetic  complications  - PONV.      ROS/MED HX  ENT/Pulmonary: Comment: - Acute pharyngitis     (+) sleep apnea, mild, uses CPAP,     Neurologic:  - neg neurologic ROS     Cardiovascular:  - neg cardiovascular ROS     METS/Exercise Tolerance:     Hematologic: Comments: - Thrombocytopenia       Musculoskeletal:  - neg musculoskeletal ROS     GI/Hepatic:       Renal/Genitourinary:       Endo:       Psychiatric/Substance Use:  - neg psychiatric ROS     Infectious Disease:       Malignancy:       Other:  - neg other ROS          Physical Exam    Airway        Mallampati: I   TM distance: > 3 FB   Neck ROM: full   Mouth opening: > 3 cm    Respiratory Devices and Support         Dental  no notable dental history         Cardiovascular   cardiovascular exam normal          Pulmonary   pulmonary exam normal                OUTSIDE LABS:  CBC:   Lab Results   Component Value Date    WBC 5.6 08/09/2022    WBC 5.9 01/05/2022    HGB 14.8 08/09/2022    HGB 14.8 01/05/2022    HCT 41.2 08/09/2022    HCT 42.7 01/05/2022     (L) 08/09/2022     (L) 01/05/2022     BMP:   Lab Results   Component Value Date     08/09/2022     01/05/2022    POTASSIUM 3.7 08/09/2022    POTASSIUM 3.8 01/05/2022    CHLORIDE 106 08/09/2022    CHLORIDE 107 01/05/2022    CO2 29 08/09/2022    CO2 28 01/05/2022    BUN 23 08/09/2022    BUN 19 01/05/2022    CR 1.07 08/09/2022    CR 1.04 01/05/2022    GLC 93 08/09/2022     (H) 01/05/2022     COAGS: No results found for: PTT, INR, FIBR  POC: No results found for: BGM, HCG, HCGS  HEPATIC:   Lab Results   Component Value Date    ALBUMIN 3.7 01/05/2022    PROTTOTAL 7.3 01/05/2022    ALT 37 01/05/2022    AST 33 01/05/2022    ALKPHOS 66 01/05/2022    BILITOTAL 0.7 01/05/2022     OTHER:   Lab Results   Component Value Date    PH 7.0 04/30/2003    MILTON 9.1 08/09/2022    TSH 1.26 09/30/2019    CRP <2.9 09/28/2018    SED 9 09/28/2018       Anesthesia Plan    ASA Status:  2   NPO Status:  NPO  Appropriate    Anesthesia Type: General.     - Airway: ETT   Induction: Intravenous.   Maintenance: Balanced.        Consents    Anesthesia Plan(s) and associated risks, benefits, and realistic alternatives discussed. Questions answered and patient/representative(s) expressed understanding.     - Discussed: Risks, Benefits and Alternatives for BOTH SEDATION and the PROCEDURE were discussed     - Discussed with:  Patient      - Extended Intubation/Ventilatory Support Discussed: No.      - Patient is DNR/DNI Status: No    Use of blood products discussed: Yes.     - Discussed with: Patient.     Postoperative Care    Pain management: IV analgesics.   PONV prophylaxis: Ondansetron (or other 5HT-3), Dexamethasone or Solumedrol, Background Propofol Infusion     Comments:    Other Comments: The risks and benefits of anesthesia, and the alternatives where applicable, have been discussed with the patient, and they wish to proceed.            So Yeon Kang

## 2022-08-21 NOTE — ANESTHESIA PREPROCEDURE EVALUATION
Anesthesia Pre-Procedure Evaluation    Patient: Jaskaran Car   MRN: [id number] : 1960        Procedure : Procedure(s):  Robot-assisted laparoscopic left inguinal hernia repair with mesh possible bilateral          Past Medical History:   Diagnosis Date     Arthritis     psoriatic     Chondromalacia of patella      Displacement of lumbar intervertebral disc without myelopathy 10/2002    L4/5 disc herniation with L4 nerve root impingement bilaterally     External hemorrhoids      Leukopenia 2013     Leukopenia      Obstructive sleep apnea- CPAP at 5 cm water 2012     Right ankle pain 2006    MRI 2006 or so     Right knee pain summer 2007    MRI 2008     Thrombocytopaenia 2013     Thrombocytopenia (H)       Past Surgical History:   Procedure Laterality Date     ABDOMEN SURGERY       ARTHROSCOPY KNEE  2012    Procedure: ARTHROSCOPY KNEE;  Left knee arthroscopy, debridement and synovial biopsy;  Surgeon: Ivan Wiggins MD;  Location:  OR     CHOLECYSTECTOMY, LAPOROSCOPIC  2006     COLONOSCOPY  2008    neg, recheck in 10 years     COLONOSCOPY N/A 2019    Procedure: COLONOSCOPY;  Surgeon: Patrick Garcia MD;  Location:  GI     EYE SURGERY        UGI ENDOSCOPY DIAG W OR W/O BRUSH/WASH  2006    normal     KNEE SURGERY       SOFT TISSUE SURGERY      FDL transfer     ZZC STRESS TEST - REGULAR ([place])  2006    negative stress thallium      Allergies   Allergen Reactions     Oxycontin [Oxycodone Hydrochloride-Polysorbate 80] Itching      Social History     Tobacco Use     Smoking status: Never Smoker     Smokeless tobacco: Never Used     Tobacco comment: no smokers in the household   Substance Use Topics     Alcohol use: Yes     Comment: 4 to 5 drinks a week      Wt Readings from Last 1 Encounters:   22 80.7 kg (178 lb)              OUTSIDE LABS:  CBC:   Lab Results   Component Value Date    WBC 5.6 2022    WBC 5.9  01/05/2022    HGB 14.8 08/09/2022    HGB 14.8 01/05/2022    HCT 41.2 08/09/2022    HCT 42.7 01/05/2022     (L) 08/09/2022     (L) 01/05/2022     BMP:   Lab Results   Component Value Date     08/09/2022     01/05/2022    POTASSIUM 3.7 08/09/2022    POTASSIUM 3.8 01/05/2022    CHLORIDE 106 08/09/2022    CHLORIDE 107 01/05/2022    CO2 29 08/09/2022    CO2 28 01/05/2022    BUN 23 08/09/2022    BUN 19 01/05/2022    CR 1.07 08/09/2022    CR 1.04 01/05/2022    GLC 93 08/09/2022     (H) 01/05/2022     COAGS: No results found for: PTT, INR, FIBR  POC: No results found for: BGM, HCG, HCGS  HEPATIC:   Lab Results   Component Value Date    ALBUMIN 3.7 01/05/2022    PROTTOTAL 7.3 01/05/2022    ALT 37 01/05/2022    AST 33 01/05/2022    ALKPHOS 66 01/05/2022    BILITOTAL 0.7 01/05/2022     OTHER:   Lab Results   Component Value Date    PH 7.0 04/30/2003    MILTON 9.1 08/09/2022    TSH 1.26 09/30/2019    CRP <2.9 09/28/2018    SED 9 09/28/2018               So Yeon Kang

## 2022-08-22 ENCOUNTER — ANESTHESIA (OUTPATIENT)
Dept: SURGERY | Facility: CLINIC | Age: 62
End: 2022-08-22
Payer: COMMERCIAL

## 2022-08-22 ENCOUNTER — TELEPHONE (OUTPATIENT)
Dept: SURGERY | Facility: CLINIC | Age: 62
End: 2022-08-22

## 2022-08-22 ENCOUNTER — HOSPITAL ENCOUNTER (OUTPATIENT)
Facility: CLINIC | Age: 62
Discharge: HOME | End: 2022-08-22
Attending: SURGERY | Admitting: SURGERY
Payer: COMMERCIAL

## 2022-08-22 VITALS
WEIGHT: 178 LBS | SYSTOLIC BLOOD PRESSURE: 133 MMHG | OXYGEN SATURATION: 98 % | BODY MASS INDEX: 27.94 KG/M2 | HEIGHT: 67 IN | RESPIRATION RATE: 13 BRPM | TEMPERATURE: 97.2 F | HEART RATE: 68 BPM | DIASTOLIC BLOOD PRESSURE: 84 MMHG

## 2022-08-22 DIAGNOSIS — Z98.890 S/P ROBOT-ASSISTED SURGICAL PROCEDURE: Primary | ICD-10-CM

## 2022-08-22 PROCEDURE — 272N000001 HC OR GENERAL SUPPLY STERILE: Performed by: SURGERY

## 2022-08-22 PROCEDURE — 250N000009 HC RX 250: Performed by: NURSE ANESTHETIST, CERTIFIED REGISTERED

## 2022-08-22 PROCEDURE — 999N000141 HC STATISTIC PRE-PROCEDURE NURSING ASSESSMENT: Performed by: SURGERY

## 2022-08-22 PROCEDURE — S2900 ROBOTIC SURGICAL SYSTEM: HCPCS | Performed by: SURGERY

## 2022-08-22 PROCEDURE — 250N000026 HC DESFLURANE, PER MIN: Performed by: SURGERY

## 2022-08-22 PROCEDURE — 250N000011 HC RX IP 250 OP 636: Performed by: SURGERY

## 2022-08-22 PROCEDURE — 250N000009 HC RX 250: Performed by: SURGERY

## 2022-08-22 PROCEDURE — C1781 MESH (IMPLANTABLE): HCPCS | Performed by: SURGERY

## 2022-08-22 PROCEDURE — 360N000080 HC SURGERY LEVEL 7, PER MIN: Performed by: SURGERY

## 2022-08-22 PROCEDURE — 258N000003 HC RX IP 258 OP 636: Performed by: NURSE ANESTHETIST, CERTIFIED REGISTERED

## 2022-08-22 PROCEDURE — 49650 LAP ING HERNIA REPAIR INIT: CPT | Mod: LT | Performed by: SURGERY

## 2022-08-22 PROCEDURE — 250N000011 HC RX IP 250 OP 636: Performed by: NURSE ANESTHETIST, CERTIFIED REGISTERED

## 2022-08-22 PROCEDURE — 250N000013 HC RX MED GY IP 250 OP 250 PS 637: Performed by: SURGERY

## 2022-08-22 PROCEDURE — 370N000017 HC ANESTHESIA TECHNICAL FEE, PER MIN: Performed by: SURGERY

## 2022-08-22 PROCEDURE — 710N000010 HC RECOVERY PHASE 1, LEVEL 2, PER MIN: Performed by: SURGERY

## 2022-08-22 PROCEDURE — 710N000012 HC RECOVERY PHASE 2, PER MINUTE: Performed by: SURGERY

## 2022-08-22 DEVICE — MESH DEXTILE ANATOMICAL 15CM X 10CM LG LEFT DXT1510AL: Type: IMPLANTABLE DEVICE | Site: ABDOMEN | Status: FUNCTIONAL

## 2022-08-22 RX ORDER — MEPERIDINE HYDROCHLORIDE 25 MG/ML
12.5 INJECTION INTRAMUSCULAR; INTRAVENOUS; SUBCUTANEOUS
Status: DISCONTINUED | OUTPATIENT
Start: 2022-08-22 | End: 2022-08-22 | Stop reason: HOSPADM

## 2022-08-22 RX ORDER — FENTANYL CITRATE 50 UG/ML
25 INJECTION, SOLUTION INTRAMUSCULAR; INTRAVENOUS
Status: CANCELLED | OUTPATIENT
Start: 2022-08-22

## 2022-08-22 RX ORDER — SODIUM CHLORIDE, SODIUM LACTATE, POTASSIUM CHLORIDE, CALCIUM CHLORIDE 600; 310; 30; 20 MG/100ML; MG/100ML; MG/100ML; MG/100ML
INJECTION, SOLUTION INTRAVENOUS CONTINUOUS
Status: DISCONTINUED | OUTPATIENT
Start: 2022-08-22 | End: 2022-08-22 | Stop reason: HOSPADM

## 2022-08-22 RX ORDER — HYDROCODONE BITARTRATE AND ACETAMINOPHEN 5; 325 MG/1; MG/1
2 TABLET ORAL
Status: COMPLETED | OUTPATIENT
Start: 2022-08-22 | End: 2022-08-22

## 2022-08-22 RX ORDER — FENTANYL CITRATE 50 UG/ML
INJECTION, SOLUTION INTRAMUSCULAR; INTRAVENOUS PRN
Status: DISCONTINUED | OUTPATIENT
Start: 2022-08-22 | End: 2022-08-22

## 2022-08-22 RX ORDER — DEXAMETHASONE SODIUM PHOSPHATE 4 MG/ML
INJECTION, SOLUTION INTRA-ARTICULAR; INTRALESIONAL; INTRAMUSCULAR; INTRAVENOUS; SOFT TISSUE PRN
Status: DISCONTINUED | OUTPATIENT
Start: 2022-08-22 | End: 2022-08-22

## 2022-08-22 RX ORDER — BUPIVACAINE HYDROCHLORIDE AND EPINEPHRINE 2.5; 5 MG/ML; UG/ML
INJECTION, SOLUTION EPIDURAL; INFILTRATION; INTRACAUDAL; PERINEURAL PRN
Status: DISCONTINUED | OUTPATIENT
Start: 2022-08-22 | End: 2022-08-22 | Stop reason: HOSPADM

## 2022-08-22 RX ORDER — PROPOFOL 10 MG/ML
INJECTION, EMULSION INTRAVENOUS CONTINUOUS PRN
Status: DISCONTINUED | OUTPATIENT
Start: 2022-08-22 | End: 2022-08-22

## 2022-08-22 RX ORDER — ONDANSETRON 4 MG/1
4 TABLET, ORALLY DISINTEGRATING ORAL EVERY 30 MIN PRN
Status: DISCONTINUED | OUTPATIENT
Start: 2022-08-22 | End: 2022-08-22 | Stop reason: HOSPADM

## 2022-08-22 RX ORDER — LIDOCAINE 40 MG/G
CREAM TOPICAL
Status: DISCONTINUED | OUTPATIENT
Start: 2022-08-22 | End: 2022-08-22 | Stop reason: HOSPADM

## 2022-08-22 RX ORDER — CEFAZOLIN SODIUM/WATER 2 G/20 ML
2 SYRINGE (ML) INTRAVENOUS
Status: COMPLETED | OUTPATIENT
Start: 2022-08-22 | End: 2022-08-22

## 2022-08-22 RX ORDER — ONDANSETRON 2 MG/ML
INJECTION INTRAMUSCULAR; INTRAVENOUS PRN
Status: DISCONTINUED | OUTPATIENT
Start: 2022-08-22 | End: 2022-08-22

## 2022-08-22 RX ORDER — PROPOFOL 10 MG/ML
INJECTION, EMULSION INTRAVENOUS PRN
Status: DISCONTINUED | OUTPATIENT
Start: 2022-08-22 | End: 2022-08-22

## 2022-08-22 RX ORDER — DIPHENHYDRAMINE HYDROCHLORIDE 50 MG/ML
INJECTION INTRAMUSCULAR; INTRAVENOUS PRN
Status: DISCONTINUED | OUTPATIENT
Start: 2022-08-22 | End: 2022-08-22

## 2022-08-22 RX ORDER — HYDROCODONE BITARTRATE AND ACETAMINOPHEN 5; 325 MG/1; MG/1
1 TABLET ORAL EVERY 6 HOURS PRN
Qty: 18 TABLET | Refills: 0 | Status: SHIPPED | OUTPATIENT
Start: 2022-08-22 | End: 2022-08-25

## 2022-08-22 RX ORDER — ONDANSETRON 2 MG/ML
4 INJECTION INTRAMUSCULAR; INTRAVENOUS EVERY 30 MIN PRN
Status: DISCONTINUED | OUTPATIENT
Start: 2022-08-22 | End: 2022-08-22 | Stop reason: HOSPADM

## 2022-08-22 RX ORDER — CEFAZOLIN SODIUM/WATER 2 G/20 ML
2 SYRINGE (ML) INTRAVENOUS SEE ADMIN INSTRUCTIONS
Status: DISCONTINUED | OUTPATIENT
Start: 2022-08-22 | End: 2022-08-22 | Stop reason: HOSPADM

## 2022-08-22 RX ORDER — HYDROCODONE BITARTRATE AND ACETAMINOPHEN 5; 325 MG/1; MG/1
1 TABLET ORAL
Status: COMPLETED | OUTPATIENT
Start: 2022-08-22 | End: 2022-08-22

## 2022-08-22 RX ORDER — KETOROLAC TROMETHAMINE 30 MG/ML
INJECTION, SOLUTION INTRAMUSCULAR; INTRAVENOUS PRN
Status: DISCONTINUED | OUTPATIENT
Start: 2022-08-22 | End: 2022-08-22

## 2022-08-22 RX ORDER — LIDOCAINE HYDROCHLORIDE 20 MG/ML
INJECTION, SOLUTION INFILTRATION; PERINEURAL PRN
Status: DISCONTINUED | OUTPATIENT
Start: 2022-08-22 | End: 2022-08-22

## 2022-08-22 RX ORDER — FENTANYL CITRATE 50 UG/ML
50 INJECTION, SOLUTION INTRAMUSCULAR; INTRAVENOUS EVERY 5 MIN PRN
Status: DISCONTINUED | OUTPATIENT
Start: 2022-08-22 | End: 2022-08-22 | Stop reason: HOSPADM

## 2022-08-22 RX ADMIN — SUGAMMADEX 200 MG: 100 INJECTION, SOLUTION INTRAVENOUS at 09:11

## 2022-08-22 RX ADMIN — SODIUM CHLORIDE, POTASSIUM CHLORIDE, SODIUM LACTATE AND CALCIUM CHLORIDE: 600; 310; 30; 20 INJECTION, SOLUTION INTRAVENOUS at 07:34

## 2022-08-22 RX ADMIN — ROCURONIUM BROMIDE 40 MG: 50 INJECTION, SOLUTION INTRAVENOUS at 07:41

## 2022-08-22 RX ADMIN — KETOROLAC TROMETHAMINE 30 MG: 30 INJECTION, SOLUTION INTRAMUSCULAR at 09:11

## 2022-08-22 RX ADMIN — PROPOFOL 200 MG: 10 INJECTION, EMULSION INTRAVENOUS at 07:40

## 2022-08-22 RX ADMIN — LIDOCAINE HYDROCHLORIDE 80 MG: 20 INJECTION, SOLUTION INFILTRATION; PERINEURAL at 07:40

## 2022-08-22 RX ADMIN — ONDANSETRON 4 MG: 2 INJECTION INTRAMUSCULAR; INTRAVENOUS at 09:11

## 2022-08-22 RX ADMIN — HYDROCODONE BITARTRATE AND ACETAMINOPHEN 1 TABLET: 5; 325 TABLET ORAL at 11:00

## 2022-08-22 RX ADMIN — MIDAZOLAM 1 MG: 1 INJECTION INTRAMUSCULAR; INTRAVENOUS at 07:32

## 2022-08-22 RX ADMIN — DEXAMETHASONE SODIUM PHOSPHATE 8 MG: 4 INJECTION, SOLUTION INTRA-ARTICULAR; INTRALESIONAL; INTRAMUSCULAR; INTRAVENOUS; SOFT TISSUE at 07:47

## 2022-08-22 RX ADMIN — Medication 2 G: at 07:32

## 2022-08-22 RX ADMIN — HYDROCODONE BITARTRATE AND ACETAMINOPHEN 1 TABLET: 5; 325 TABLET ORAL at 09:51

## 2022-08-22 RX ADMIN — FENTANYL CITRATE 50 MCG: 50 INJECTION, SOLUTION INTRAMUSCULAR; INTRAVENOUS at 09:10

## 2022-08-22 RX ADMIN — PROPOFOL 100 MCG/KG/MIN: 10 INJECTION, EMULSION INTRAVENOUS at 07:44

## 2022-08-22 RX ADMIN — DIPHENHYDRAMINE HYDROCHLORIDE 12.5 MG: 50 INJECTION, SOLUTION INTRAMUSCULAR; INTRAVENOUS at 07:51

## 2022-08-22 RX ADMIN — FENTANYL CITRATE 50 MCG: 50 INJECTION, SOLUTION INTRAMUSCULAR; INTRAVENOUS at 07:32

## 2022-08-22 ASSESSMENT — ACTIVITIES OF DAILY LIVING (ADL)
ADLS_ACUITY_SCORE: 35

## 2022-08-22 NOTE — TELEPHONE ENCOUNTER
Reason for Call:  Form, our goal is to have forms completed with 72 hours, however, some forms may require a visit or additional information.    Type of letter, form or note:  disability    Who is the form from?: [employer] (if other please explain)    Where did the form come from: form was faxed in    What clinic location was the form placed at?: [facility]    Where the form was placed: [facility] [place] Box/Folder    What number is listed as a contact on the form?: [phone]       Additional comments:     Call taken on 8/22/2022 at 10:00 AM by Rosy Alvarez

## 2022-08-22 NOTE — TELEPHONE ENCOUNTER
CHANI on file. [employer] form complete. Faxed to [phone], copy to scanning, copy placed in RN faxed items drawer.    Ren Jordan MA  [facility]  8/22/2022 12:52 PM

## 2022-08-22 NOTE — OP NOTE
Procedure Date: 08/22/2022     PROCEDURE:  Robot-assisted laparoscopic left inguinal hernia repair with mesh.     PREOPERATIVE DIAGNOSIS:  left inguinal hernia.     POSTOPERATIVE DIAGNOSIS:  left indirect inguinal hernia. Cord lipoma     SURGEON:  Jem Lucero DO     ASSISTANT:  None.     ANESTHESIA:  General endotracheal anesthesia.     SPECIMENS:  None.     COMPLICATIONS:  None immediately apparent.     ESTIMATED BLOOD LOSS:  5 mL.     INDICATIONS FOR PROCEDURE: Ramy is a 62-year-old male whom I met in the surgical clinic recently with a left groin pain with exertion.    He did not have obvious hernia on physical exam so he was followed up with ultrasound imaging which confirmed a left-sided fat-containing inguinal hernia.  Due to his ongoing symptoms,  I recommended robot-assisted laparoscopic left inguinal hernia repair with mesh, possible bilateral.  After informed discussion, he agreed to proceed with the stated procedure.     DESCRIPTION OF PROCEDURE:  After the informed consent was obtained, the patient was brought from the preoperative holding area to the operating room and placed in supine position.  Anesthesia was induced.  He was prepped and draped in normal sterile fashion.  Timeout was performed.  After correct patient and correct procedure were verified, we began by making an 8 mm incision supraumbilically.  A Veress needle was inserted into the peritoneal cavity, and the abdomen was insufflated to 15 mmHg.  Robot camera trocar was inserted.  General survey of the abdomen revealed a left-sided indirect inguinal hernia, right side without hernia.  The patient was placed in Trendelenburg position.  I placed 2 additional 8 mm robotic trocars in the left and right mid abdomen.  We chose a 15 x 10 cm Dextile mesh.  This was placed in the peritoneal cavity.  We placed a 2-0 Stratafix and a 2-0 Vicryl suture in the peritoneal cavity as well.  The robot was docked.  We chose a fenestrated bipolar  grasper on the left and a monopolar scissors on the right.  We incised the peritoneum in the left lower quadrant, developed the preperitoneal plane with mainly blunt dissection with a minimal amount of electrocautery.  The indirect hernia sac was grasped and retracted while the cord structures were carefully bluntly dissected away.  Once an appropriate space had been cleared in the preperitoneal space, the mesh was placed into position.  I secured the mesh into place using 2-0 Vicryl sutures in interrupted fashion, one suture above the pubic tubercle at Brian's ligament, two additional interrupted sutures flanking the inferior epigastric vessels superiorly.  The mesh appeared to be in excellent position.  The peritoneum was then reapproximated using the 2-0 Stratafix suture in a running fashion.  A survey of the operative field revealed no apparent complications.  The two sutures with needles intact were removed from the peritoneal cavity without issue.  The robot was then undocked.  The patient's abdomen was then desufflated, while the ports were removed under direct visualization.  The skin was instilled with 0.25% Marcaine with epinephrine for local anesthesia.  Skin was closed with inverted interrupted 4-0 Monocryl sutures and an Exofin dressing was applied.  At the completion of the case, all instruments, needles, and sponges were accounted for after a correct count.  The patient was then awoken from anesthesia and brought to the recovery room in stable condition.     Jem Lucero DO

## 2022-08-22 NOTE — ANESTHESIA CARE TRANSFER NOTE
Patient: Jaskaran Car    Procedure: Procedure(s):  Robot-assisted laparoscopic left inguinal hernia repair with mesh       Diagnosis: Left inguinal hernia [K40.90]  Diagnosis Additional Information: No value filed.    Anesthesia Type:   General     Note:    Oropharynx: oropharynx clear of all foreign objects and spontaneously breathing  Level of Consciousness: drowsy  Oxygen Supplementation: face mask  Level of Supplemental Oxygen (L/min / FiO2): 6  Independent Airway: airway patency satisfactory and stable  Dentition: dentition unchanged  Vital Signs Stable: post-procedure vital signs reviewed and stable  Report to RN Given: handoff report given  Patient transferred to: PACU    Handoff Report: Identifed the Patient, Identified the Reponsible Provider, Reviewed the pertinent medical history, Discussed the surgical course, Reviewed Intra-OP anesthesia mangement and issues during anesthesia, Set expectations for post-procedure period and Allowed opportunity for questions and acknowledgement of understanding      Vitals:  Vitals Value Taken Time   BP 84/72 08/22/22 0928   Temp     Pulse 67 08/22/22 0931   Resp 15 08/22/22 0931   SpO2 97 % 08/22/22 0931   Vitals shown include unvalidated device data.    Electronically Signed By: TIFFANI Card CRNA  August 22, 2022  9:32 AM

## 2022-08-22 NOTE — INTERVAL H&P NOTE
"I have reviewed the surgical (or preoperative) H&P that is linked to this encounter, and examined the patient. There are no significant changes    Clinical Conditions Present on Arrival:  Clinically Significant Risk Factors Present on Admission                   # Overweight: Estimated body mass index is 27.88 kg/m  as calculated from the following:    Height as of this encounter: 1.702 m (5' 7\").    Weight as of this encounter: 80.7 kg (178 lb).       "

## 2022-08-22 NOTE — ANESTHESIA PROCEDURE NOTES
Airway       Patient location during procedure: OR       Procedure Start/Stop Times: 8/22/2022 7:43 AM  Staff -        Performed By: CRNA  Consent for Airway        Urgency: elective  Indications and Patient Condition       Indications for airway management: lonnie-procedural       Induction type:intravenous       Mask difficulty assessment: 1 - vent by mask    Final Airway Details       Final airway type: endotracheal airway       Successful airway: ETT - single  Endotracheal Airway Details        ETT size (mm): 7.0       Cuffed: yes       Successful intubation technique: direct laryngoscopy       DL Blade Type: MAC 3       Grade View of Cords: 1       Adjucts: stylet       Position: Right       Measured from: lips       Bite block used: Oral Airway    Post intubation assessment        Placement verified by: capnometry, equal breath sounds and chest rise        Number of attempts at approach: 1       Number of other approaches attempted: 0       Secured with: plastic tape       Ease of procedure: easy       Dentition: Intact and Unchanged    Medication(s) Administered   Medication Administration Time: 8/22/2022 7:43 AM

## 2022-08-22 NOTE — BRIEF OP NOTE
[facility] Brief Operative Note    Pre-operative diagnosis: Left inguinal hernia [K40.90]   Post-operative diagnosis left indirect inguinal hernia, left cord lipoma     Procedure: Procedure(s):  Robot-assisted laparoscopic left inguinal hernia repair with mesh   Surgeon(s): Surgeon(s) and Role:     * Jem Lucero, DO - Primary   Estimated blood loss: 5 mL    Specimens: * No specimens in log *   Findings: Small indirect hernia with medium sized left cord lipoma

## 2022-08-22 NOTE — TELEPHONE ENCOUNTER
Form received is a new form for patient to return to work. Patient has follow up appointment with Dr. Lucero on 8/31/22. Form placed at RN desk.     Tarah Johnson RN on 8/22/2022 at 4:34 PM

## 2022-08-22 NOTE — TELEPHONE ENCOUNTER
Form is currently on rn's desk- they are working on it.  Tammy Shannon, [facility]

## 2022-08-22 NOTE — DISCHARGE INSTRUCTIONS
[facility]    Home Care Following Hernia Repair (Open or Laparoscopic)    Dr. Lucero    Hernia Type:  Inguinal    Care of the Incision:  Remove gauze dressing (if present) after 24 hours and then it is ok to shower.   If surgical glue was used, keep your incision dry for 24 hours.  Then you may shower, but don t submerge under water for at least 2 weeks.  Gently pat your incision dry with a freshly laundered towel.  Do not touch your incision with bare hands or pick at scabs.  Leave your incision open to air.  Cover it only if clothing rubs or irritates it.  Activity:  Gradually increase your activity.  Walk short distances several times each day and increase the distance as your strength allows.  To promote circulation, do not cross your legs while sitting.  No strenuous lifting or straining for 2 weeks.   Do not lift anything over 20 pounds for 2 weeks.  Return to work will be determined by the type of work you do and should be discussed with your physician.  Do not drive or operate equipment while taking prescription pain medicines.  You may drive 1 week after surgery if you have stopped taking prescription pain medicines and are pain-free enough to react quickly and make an emergency stop if necessary.    Diet:  Return to the diet you were on before surgery.  Drink plenty of  water.  Avoid foods that cause constipation.    REMEMBER--most prescription pain pills cause constipation.  Walking, extra fluids, and increased fiber (fresh fruits and vegetables, etc.) are natural remedies for constipation.  You can also take mineral oil, 1-2 Tablespoons per day.  If still constipated you may try a stool softener such as Colace or Miralax.    Call Your Physician if You Have:  Redness, increased swelling or cloudy drainage from your incision.  A temperature of more than 101 degrees F.  Worsening pain in your incision not relieved by your prescription pain pills and/or a short rest.  Any questions or  concerns about your recovery, please call     Business hours [phone]    After hours [phone] Nurse Advice Line (24 hours a day)    Follow-up Care:  Make an appointment 2-3 weeks after your surgery if not already done.  Call [phone]

## 2022-08-22 NOTE — ANESTHESIA POSTPROCEDURE EVALUATION
Patient: Jaskaran Car    Procedure: Procedure(s):  Robot-assisted laparoscopic left inguinal hernia repair with mesh       Anesthesia Type:  General    Note:  Disposition: Outpatient   Postop Pain Control: Uneventful            Sign Out: Well controlled pain   PONV: No   Neuro/Psych: Uneventful            Sign Out: Acceptable/Baseline neuro status   Airway/Respiratory: Uneventful            Sign Out: Acceptable/Baseline resp. status   CV/Hemodynamics: Uneventful            Sign Out: Acceptable CV status   Other NRE: NONE   DID A NON-ROUTINE EVENT OCCUR? No    Event details/Postop Comments:  Pt was happy with anesthesia care.  No complications.  I will follow up with the pt if needed.           Last vitals:  Vitals Value Taken Time   /85 08/22/22 1000   Temp 97.2  F (36.2  C) 08/22/22 1000   Pulse 62 08/22/22 1002   Resp 7 08/22/22 1002   SpO2 99 % 08/22/22 1002   Vitals shown include unvalidated device data.    Electronically Signed By: TIFFANI Card CRNA  August 22, 2022  12:09 PM

## 2022-08-24 NOTE — TELEPHONE ENCOUNTER
Forms completed to the best of writer's knowledge and faxed to [facility] at [phone].    Tarah Johnson RN on 8/24/2022 at 2:50 PM

## 2022-08-31 ENCOUNTER — OFFICE VISIT (OUTPATIENT)
Dept: SURGERY | Facility: OTHER | Age: 62
End: 2022-08-31
Payer: COMMERCIAL

## 2022-08-31 VITALS
WEIGHT: 180 LBS | BODY MASS INDEX: 28.25 KG/M2 | DIASTOLIC BLOOD PRESSURE: 70 MMHG | TEMPERATURE: 98.3 F | SYSTOLIC BLOOD PRESSURE: 112 MMHG | HEIGHT: 67 IN

## 2022-08-31 DIAGNOSIS — Z98.890 S/P ROBOT-ASSISTED SURGICAL PROCEDURE: Primary | ICD-10-CM

## 2022-08-31 PROCEDURE — 99024 POSTOP FOLLOW-UP VISIT: CPT | Performed by: SURGERY

## 2022-08-31 NOTE — PROGRESS NOTES
General Surgery Follow Up    Pt returns for follow up visit s/p robot-assisted laparoscopic inguinal hernia repair with mesh    HPI:  Doing pretty well.  Some occasional discomfort.  Still off of work.  Had some constipation issues at first but now having no issues with bowels or bladder.      Past Medical History:   Diagnosis Date     Arthritis     psoriatic     Chondromalacia of patella      Displacement of lumbar intervertebral disc without myelopathy 10/2002    L4/5 disc herniation with L4 nerve root impingement bilaterally     External hemorrhoids      Leukopenia 05/01/2013     Leukopenia      Obstructive sleep apnea- CPAP at 5 cm water 02/23/2012     Right ankle pain 06/2006    MRI Nov 2006 or so     Right knee pain summer 2007    MRI Nov 2008     Thrombocytopaenia 06/12/2013     Thrombocytopenia (H)        Past Surgical History:   Procedure Laterality Date     ABDOMEN SURGERY       ARTHROSCOPY KNEE  08/30/2012    Procedure: ARTHROSCOPY KNEE;  Left knee arthroscopy, debridement and synovial biopsy;  Surgeon: Ivan Wiggins MD;  Location:  OR     CHOLECYSTECTOMY, LAPOROSCOPIC  12/21/2006     COLONOSCOPY  09/30/2008    neg, recheck in 10 years     COLONOSCOPY N/A 09/30/2019    Procedure: COLONOSCOPY;  Surgeon: Patrick Garcia MD;  Location:  GI     DAVINCI HERNIORRHAPHY INGUINAL Left 8/22/2022    Procedure: Robot-assisted laparoscopic left inguinal hernia repair with mesh;  Surgeon: Jem Lucero DO;  Location:  OR     EYE SURGERY       HC UGI ENDOSCOPY DIAG W OR W/O BRUSH/WASH  12/11/2006    normal     KNEE SURGERY       SOFT TISSUE SURGERY      FDL transfer     ZZC STRESS TEST - REGULAR ([place])  01/01/2006    negative stress thallium       Social History     Socioeconomic History     Marital status:      Spouse name: Reshma     Number of children: 2     Years of education: Not on file     Highest education level: Not on file   Occupational History     Occupation:  maintance     Comment: [employer]     Employer: [employer]   Tobacco Use     Smoking status: Never Smoker     Smokeless tobacco: Never Used     Tobacco comment: no smokers in the household   Vaping Use     Vaping Use: Never used   Substance and Sexual Activity     Alcohol use: Yes     Comment: 4 to 5 drinks a week     Drug use: Never     Sexual activity: Not Currently     Partners: Female     Birth control/protection: Female Surgical     Comment: tubal ligation   Other Topics Concern      Service No     Blood Transfusions No     Caffeine Concern No     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     Exercise Yes     Comment: 2 times a week: yoga and hockey, stretching     Bike Helmet Yes     Seat Belt Yes     Self-Exams No     Parent/sibling w/ CABG, MI or angioplasty before 65F 55M? No   Social History Narrative     Not on file     Social Determinants of Health     Financial Resource Strain: Not on file   Food Insecurity: Not on file   Transportation Needs: Not on file   Physical Activity: Not on file   Stress: Not on file   Social Connections: Not on file   Intimate Partner Violence: Not on file   Housing Stability: Not on file       Current Outpatient Medications   Medication Sig Dispense Refill     Cholecalciferol (VITAMIN D3 PO) Take 5,000 Units by mouth daily       HUMIRA PEN 40 MG/0.8ML pen kit every 14 days  0     ibuprofen (ADVIL/MOTRIN) 200 MG capsule Take 4 capsules (800 mg) by mouth every 8 hours as needed for inflammatory pain 20 capsule 0     MAGNESIUM PO Take by mouth daily       Omega-3 Fatty Acids (FISH OIL) 1000 MG CPDR Take  by mouth daily.       order for DME Equipment ordered: RESMED Auto PAP Mask type: Nasal Settings: 5-10cm H2O       oxybutynin ER  "(DITROPAN-XL) 10 MG 24 hr tablet TAKE 1 TABLET DAILY 90 tablet 3     predniSONE (DELTASONE) 5 MG tablet Take 5 mg by mouth daily  0     Zinc 15 MG CAPS Take 1 tablet by mouth daily 30 capsule        Medications and history reviewed    Physical exam:  Vitals: /70   Temp 98.3  F (36.8  C) (Temporal)   Ht 1.705 m (5' 7.13\")   Wt 81.6 kg (180 lb)   BMI 28.08 kg/m    BMI= Body mass index is 28.08 kg/m .    HEART: RRR, no new murmurs  LUNGS: CTAB, equal chest rise, good effort  ABD: soft, non tender, non distended  INCISIONS: Clean dry and intact  EXT: SNYDER, no deformities    PATHOLOGY:  None    Assessment:     ICD-10-CM    1. S/P robot-assisted surgical procedure  Z98.890      Plan: Doing well.  Restrictions reviewed and questions answered.  Follow-up as needed.    Jem Lucero, DO  "

## 2022-08-31 NOTE — LETTER
8/31/2022         RE: Jaskaran Car  [address] [facility] [address]        Dear Colleague,    Thank you for referring your patient, Jaskaran Car, to the [facility]. Please see a copy of my visit note below.    General Surgery Follow Up    Pt returns for follow up visit s/p robot-assisted laparoscopic inguinal hernia repair with mesh    HPI:  Doing pretty well.  Some occasional discomfort.  Still off of work.  Had some constipation issues at first but now having no issues with bowels or bladder.      Past Medical History:   Diagnosis Date     Arthritis     psoriatic     Chondromalacia of patella      Displacement of lumbar intervertebral disc without myelopathy 10/2002    L4/5 disc herniation with L4 nerve root impingement bilaterally     External hemorrhoids      Leukopenia 05/01/2013     Leukopenia      Obstructive sleep apnea- CPAP at 5 cm water 02/23/2012     Right ankle pain 06/2006    MRI Nov 2006 or so     Right knee pain summer 2007    MRI Nov 2008     Thrombocytopaenia 06/12/2013     Thrombocytopenia (H)        Past Surgical History:   Procedure Laterality Date     ABDOMEN SURGERY       ARTHROSCOPY KNEE  08/30/2012    Procedure: ARTHROSCOPY KNEE;  Left knee arthroscopy, debridement and synovial biopsy;  Surgeon: Ivan Wiggins MD;  Location:  OR     CHOLECYSTECTOMY, LAPOROSCOPIC  12/21/2006     COLONOSCOPY  09/30/2008    neg, recheck in 10 years     COLONOSCOPY N/A 09/30/2019    Procedure: COLONOSCOPY;  Surgeon: Patrick Garcia MD;  Location:  GI     DAVINCI HERNIORRHAPHY INGUINAL Left 8/22/2022    Procedure: Robot-assisted laparoscopic left inguinal hernia repair with mesh;  Surgeon: Jem Lucero DO;  Location:  OR     EYE SURGERY       [facility] UGI ENDOSCOPY DIAG W OR W/O BRUSH/WASH  12/11/2006    normal     KNEE SURGERY       SOFT TISSUE SURGERY      FDL transfer     ZZC STRESS TEST - REGULAR ([place])  01/01/2006    negative stress  thallium       Social History     Socioeconomic History     Marital status:      Spouse name: Reshma     Number of children: 2     Years of education: Not on file     Highest education level: Not on file   Occupational History     Occupation: [employer]     Comment: [employer]     Employer: [employer]   Tobacco Use     Smoking status: Never Smoker     Smokeless tobacco: Never Used     Tobacco comment: no smokers in the household   Vaping Use     Vaping Use: Never used   Substance and Sexual Activity     Alcohol use: Yes     Comment: 4 to 5 drinks a week     Drug use: Never     Sexual activity: Not Currently     Partners: Female     Birth control/protection: Female Surgical     Comment: tubal ligation   Other Topics Concern      Service No     Blood Transfusions No     Caffeine Concern No     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     Exercise Yes     Comment: 2 times a week: yoga and hockey, stretching     Bike Helmet Yes     Seat Belt Yes     Self-Exams No     Parent/sibling w/ CABG, MI or angioplasty before 65F 55M? No   Social History Narrative     Not on file     Social Determinants of Health     Financial Resource Strain: Not on file   Food Insecurity: Not on file   Transportation Needs: Not on file   Physical Activity: Not on file   Stress: Not on file   Social Connections: Not on file   Intimate Partner Violence: Not on file   Housing Stability: Not on file       Current Outpatient Medications   Medication Sig Dispense Refill     Cholecalciferol (VITAMIN D3 PO) Take 5,000 Units by mouth daily       HUMIRA PEN 40 MG/0.8ML pen kit every 14 days  0     ibuprofen (ADVIL/MOTRIN) 200 MG capsule Take 4 capsules (800 mg) by mouth every 8 hours as needed for inflammatory  "pain 20 capsule 0     MAGNESIUM PO Take by mouth daily       Omega-3 Fatty Acids (FISH OIL) 1000 MG CPDR Take  by mouth daily.       order for DME Equipment ordered: RESMED Auto PAP Mask type: Nasal Settings: 5-10cm H2O       oxybutynin ER (DITROPAN-XL) 10 MG 24 hr tablet TAKE 1 TABLET DAILY 90 tablet 3     predniSONE (DELTASONE) 5 MG tablet Take 5 mg by mouth daily  0     Zinc 15 MG CAPS Take 1 tablet by mouth daily 30 capsule        Medications and history reviewed    Physical exam:  Vitals: /70   Temp 98.3  F (36.8  C) (Temporal)   Ht 1.705 m (5' 7.13\")   Wt 81.6 kg (180 lb)   BMI 28.08 kg/m    BMI= Body mass index is 28.08 kg/m .    HEART: RRR, no new murmurs  LUNGS: CTAB, equal chest rise, good effort  ABD: soft, non tender, non distended  INCISIONS: Clean dry and intact  EXT: SNYDER, no deformities    PATHOLOGY:  None    Assessment:     ICD-10-CM    1. S/P robot-assisted surgical procedure  Z98.890      Plan: Doing well.  Restrictions reviewed and questions answered.  Follow-up as needed.    Jem Lucero, DO      Again, thank you for allowing me to participate in the care of your patient.        Sincerely,        Jem Lucero, DO    "

## 2022-10-09 ENCOUNTER — HEALTH MAINTENANCE LETTER (OUTPATIENT)
Age: 62
End: 2022-10-09

## 2022-10-13 ENCOUNTER — TRANSFERRED RECORDS (OUTPATIENT)
Dept: HEALTH INFORMATION MANAGEMENT | Facility: CLINIC | Age: 62
End: 2022-10-13

## 2022-11-22 DIAGNOSIS — N40.1 BENIGN PROSTATIC HYPERPLASIA WITH INCOMPLETE BLADDER EMPTYING: ICD-10-CM

## 2022-11-22 DIAGNOSIS — R39.14 BENIGN PROSTATIC HYPERPLASIA WITH INCOMPLETE BLADDER EMPTYING: ICD-10-CM

## 2022-11-28 RX ORDER — OXYBUTYNIN CHLORIDE 10 MG/1
TABLET, EXTENDED RELEASE ORAL
Qty: 90 TABLET | Refills: 0 | Status: SHIPPED | OUTPATIENT
Start: 2022-11-28 | End: 2023-03-01

## 2022-11-28 NOTE — TELEPHONE ENCOUNTER
Pending Prescriptions:                       Disp   Refills    oxybutynin ER (DITROPAN XL) 10 MG 24 hr t*90 tab*0            Sig: TAKE 1 TABLET DAILY    Medication is being filled for 1 time boris refill only due to:  Patient is due for preventative exam (due around 1/9/23).    Please call and help schedule.  Thank you!    Kassie Evans RN on 11/28/2022 at 10:13 AM

## 2023-02-12 ENCOUNTER — HEALTH MAINTENANCE LETTER (OUTPATIENT)
Age: 63
End: 2023-02-12

## 2023-02-26 DIAGNOSIS — N40.1 BENIGN PROSTATIC HYPERPLASIA WITH INCOMPLETE BLADDER EMPTYING: ICD-10-CM

## 2023-02-26 DIAGNOSIS — R39.14 BENIGN PROSTATIC HYPERPLASIA WITH INCOMPLETE BLADDER EMPTYING: ICD-10-CM

## 2023-03-01 RX ORDER — OXYBUTYNIN CHLORIDE 10 MG/1
TABLET, EXTENDED RELEASE ORAL
Qty: 90 TABLET | Refills: 1 | Status: SHIPPED | OUTPATIENT
Start: 2023-03-01 | End: 2023-09-24

## 2023-09-21 DIAGNOSIS — N40.1 BENIGN PROSTATIC HYPERPLASIA WITH INCOMPLETE BLADDER EMPTYING: ICD-10-CM

## 2023-09-21 DIAGNOSIS — R39.14 BENIGN PROSTATIC HYPERPLASIA WITH INCOMPLETE BLADDER EMPTYING: ICD-10-CM

## 2023-09-22 RX ORDER — OXYBUTYNIN CHLORIDE 10 MG/1
10 TABLET, EXTENDED RELEASE ORAL DAILY
Qty: 90 TABLET | Refills: 1 | OUTPATIENT
Start: 2023-09-22

## 2023-09-24 RX ORDER — OXYBUTYNIN CHLORIDE 10 MG/1
10 TABLET, EXTENDED RELEASE ORAL DAILY
Qty: 90 TABLET | Refills: 0 | Status: SHIPPED | OUTPATIENT
Start: 2023-09-24 | End: 2023-11-29

## 2023-09-25 NOTE — TELEPHONE ENCOUNTER
Appointment has been scheduled, provider notified, Rx called into pharmacy. I will done this encounter.

## 2023-10-11 ENCOUNTER — TRANSFERRED RECORDS (OUTPATIENT)
Dept: HEALTH INFORMATION MANAGEMENT | Facility: CLINIC | Age: 63
End: 2023-10-11
Payer: COMMERCIAL

## 2023-10-11 LAB
AST SERPL-CCNC: 26 U/L (ref 5–34)
CREATININE (EXTERNAL): 1 MG/DL (ref 0.5–1.3)
GFR ESTIMATED (EXTERNAL): 80 ML/MIN/1.73M2

## 2023-11-29 ENCOUNTER — OFFICE VISIT (OUTPATIENT)
Dept: FAMILY MEDICINE | Facility: OTHER | Age: 63
End: 2023-11-29
Payer: COMMERCIAL

## 2023-11-29 VITALS
WEIGHT: 180 LBS | HEIGHT: 66 IN | DIASTOLIC BLOOD PRESSURE: 70 MMHG | SYSTOLIC BLOOD PRESSURE: 114 MMHG | HEART RATE: 64 BPM | BODY MASS INDEX: 28.93 KG/M2 | TEMPERATURE: 97.9 F | RESPIRATION RATE: 16 BRPM | OXYGEN SATURATION: 96 %

## 2023-11-29 DIAGNOSIS — D69.6 THROMBOCYTOPENIA (H): ICD-10-CM

## 2023-11-29 DIAGNOSIS — L40.50 PSORIATIC ARTHRITIS (H): ICD-10-CM

## 2023-11-29 DIAGNOSIS — E78.5 HYPERLIPIDEMIA WITH TARGET LDL LESS THAN 130: ICD-10-CM

## 2023-11-29 DIAGNOSIS — R35.0 BENIGN PROSTATIC HYPERPLASIA WITH URINARY FREQUENCY: ICD-10-CM

## 2023-11-29 DIAGNOSIS — N40.1 BENIGN PROSTATIC HYPERPLASIA WITH URINARY FREQUENCY: ICD-10-CM

## 2023-11-29 DIAGNOSIS — Z79.899 HIGH RISK MEDICATION USE: ICD-10-CM

## 2023-11-29 DIAGNOSIS — R39.14 BENIGN PROSTATIC HYPERPLASIA WITH INCOMPLETE BLADDER EMPTYING: ICD-10-CM

## 2023-11-29 DIAGNOSIS — G47.33 OBSTRUCTIVE SLEEP APNEA: ICD-10-CM

## 2023-11-29 DIAGNOSIS — N40.1 BENIGN PROSTATIC HYPERPLASIA WITH INCOMPLETE BLADDER EMPTYING: ICD-10-CM

## 2023-11-29 DIAGNOSIS — Z00.00 ROUTINE GENERAL MEDICAL EXAMINATION AT A HEALTH CARE FACILITY: Primary | ICD-10-CM

## 2023-11-29 DIAGNOSIS — M20.42 HAMMERTOE OF LEFT FOOT: ICD-10-CM

## 2023-11-29 LAB
ALBUMIN SERPL BCG-MCNC: 4.6 G/DL (ref 3.5–5.2)
ALP SERPL-CCNC: 72 U/L (ref 40–150)
ALT SERPL W P-5'-P-CCNC: 27 U/L (ref 0–70)
ANION GAP SERPL CALCULATED.3IONS-SCNC: 13 MMOL/L (ref 7–15)
AST SERPL W P-5'-P-CCNC: 25 U/L (ref 0–45)
BILIRUB SERPL-MCNC: 0.7 MG/DL
BUN SERPL-MCNC: 14.5 MG/DL (ref 8–23)
CALCIUM SERPL-MCNC: 9.2 MG/DL (ref 8.8–10.2)
CHLORIDE SERPL-SCNC: 100 MMOL/L (ref 98–107)
CHOLEST SERPL-MCNC: 256 MG/DL
CREAT SERPL-MCNC: 1.18 MG/DL (ref 0.67–1.17)
DEPRECATED HCO3 PLAS-SCNC: 27 MMOL/L (ref 22–29)
EGFRCR SERPLBLD CKD-EPI 2021: 69 ML/MIN/1.73M2
ERYTHROCYTE [DISTWIDTH] IN BLOOD BY AUTOMATED COUNT: 11.7 % (ref 10–15)
GLUCOSE SERPL-MCNC: 92 MG/DL (ref 70–99)
HCT VFR BLD AUTO: 43.7 % (ref 40–53)
HDLC SERPL-MCNC: 66 MG/DL
HGB BLD-MCNC: 15.4 G/DL (ref 13.3–17.7)
LDLC SERPL CALC-MCNC: 164 MG/DL
MCH RBC QN AUTO: 34.1 PG (ref 26.5–33)
MCHC RBC AUTO-ENTMCNC: 35.2 G/DL (ref 31.5–36.5)
MCV RBC AUTO: 97 FL (ref 78–100)
NONHDLC SERPL-MCNC: 190 MG/DL
PLATELET # BLD AUTO: 136 10E3/UL (ref 150–450)
POTASSIUM SERPL-SCNC: 3.7 MMOL/L (ref 3.4–5.3)
PROT SERPL-MCNC: 7.1 G/DL (ref 6.4–8.3)
PSA SERPL DL<=0.01 NG/ML-MCNC: 0.28 NG/ML (ref 0–4.5)
RBC # BLD AUTO: 4.51 10E6/UL (ref 4.4–5.9)
SODIUM SERPL-SCNC: 140 MMOL/L (ref 135–145)
TRIGL SERPL-MCNC: 128 MG/DL
WBC # BLD AUTO: 4.4 10E3/UL (ref 4–11)

## 2023-11-29 PROCEDURE — 80061 LIPID PANEL: CPT | Performed by: FAMILY MEDICINE

## 2023-11-29 PROCEDURE — 80053 COMPREHEN METABOLIC PANEL: CPT | Performed by: FAMILY MEDICINE

## 2023-11-29 PROCEDURE — 36415 COLL VENOUS BLD VENIPUNCTURE: CPT | Performed by: FAMILY MEDICINE

## 2023-11-29 PROCEDURE — 99214 OFFICE O/P EST MOD 30 MIN: CPT | Mod: 25 | Performed by: FAMILY MEDICINE

## 2023-11-29 PROCEDURE — 99396 PREV VISIT EST AGE 40-64: CPT | Performed by: FAMILY MEDICINE

## 2023-11-29 PROCEDURE — 85027 COMPLETE CBC AUTOMATED: CPT | Performed by: FAMILY MEDICINE

## 2023-11-29 PROCEDURE — G0103 PSA SCREENING: HCPCS | Performed by: FAMILY MEDICINE

## 2023-11-29 RX ORDER — OXYBUTYNIN CHLORIDE 15 MG/1
15 TABLET, EXTENDED RELEASE ORAL DAILY
Qty: 90 TABLET | Refills: 1 | Status: SHIPPED | OUTPATIENT
Start: 2023-11-29 | End: 2024-07-18

## 2023-11-29 ASSESSMENT — PAIN SCALES - GENERAL: PAINLEVEL: NO PAIN (0)

## 2023-11-29 NOTE — PROGRESS NOTES
SUBJECTIVE:   Ramy is a 63 year old, presenting for the following:  Physical        11/29/2023     4:32 PM   Additional Questions   Roomed by Stormy   Accompanied by Self       History of Present Illness       Reason for visit:  Yearly Physical    He eats 4 or more servings of fruits and vegetables daily.He consumes 0 sweetened beverage(s) daily.He exercises with enough effort to increase his heart rate 30 to 60 minutes per day.  He exercises with enough effort to increase his heart rate 4 days per week.   He is taking medications regularly.  Healthy Habits:     Getting at least 3 servings of Calcium per day:  Yes    Bi-annual eye exam:  Yes    Dental care twice a year:  Yes    Sleep apnea or symptoms of sleep apnea:  Sleep apnea    Diet:  Regular (no restrictions)    Frequency of exercise:  4-5 days/week    Duration of exercise:  Greater than 60 minutes    Taking medications regularly:  0    Medication side effects:  None    Additional concerns today:  No    Concern - corn on toe left foot   Onset: couple months   Description:   In between middle and ring toe, bulged and dark in center   Intensity: severe  Progression of Symptoms:  same  Accompanying Signs & Symptoms:  Hurts to walk with shoes   Previous history of similar problem:   no  Precipitating factors:   Worsened by: shoes   Alleviating factors:  Improved by: shoe spacers      Therapies Tried and outcome: none    The patient presents for evaluation of multiple medical concerns.    He is using a CPAP machine for his sleep apnea. He switched using a different style of mask. He was using the pillows before, but now he is using the nasal mask. It is working a lot better for him.    He has a callus on his left toe. He has been trying to dig it center out. It does not keep him from doing anything, but it is sore.    His psoriatic arthritis has been under control with Humira. He has flareups once in a while. It is usually in his hand and they are a little tender  right now.    His bladder is hit or miss. Most this week it has been really good. There are days it is back to normal. It comes and goes. He is on Ditropan XL 10 mg.    He denies any skin changes or moles. His hernia has been repaired and he is doing good.    Have you ever done Advance Care Planning? (For example, a Health Directive, POLST, or a discussion with a medical provider or your loved ones about your wishes): No, advance care planning information given to patient to review.  Advanced care planning was discussed at today's visit.    Social History     Tobacco Use    Smoking status: Never    Smokeless tobacco: Never    Tobacco comments:     no smokers in the household   Substance Use Topics    Alcohol use: Yes     Comment: 4 to 5 drinks a week             1/3/2022     1:59 PM   Alcohol Use   Prescreen: >3 drinks/day or >7 drinks/week? No       Last PSA:   PSA   Date Value Ref Range Status   02/12/2021 0.30 0 - 4 ug/L Final     Comment:     Assay Method:  Chemiluminescence using Siemens Vista analyzer       Reviewed orders with patient. Reviewed health maintenance and updated orders accordingly - Yes  Lab work is in process    Reviewed and updated as needed this visit by clinical staff   Tobacco  Allergies  Meds  Problems  Med Hx  Surg Hx  Fam Hx          Reviewed and updated as needed this visit by Provider   Tobacco  Allergies  Meds  Problems  Med Hx  Surg Hx  Fam Hx             Review of Systems  CONSTITUTIONAL: NEGATIVE for fever, chills, change in weight  INTEGUMENTARY/SKIN: NEGATIVE for worrisome rashes, moles or lesions  EYES: NEGATIVE for vision changes or irritation  ENT: NEGATIVE for ear, mouth and throat problems  RESP: NEGATIVE for significant cough or SOB  CV: NEGATIVE for chest pain, palpitations or peripheral edema  GI: NEGATIVE for nausea, abdominal pain, heartburn, or change in bowel habits   male: negative for dysuria, hematuria, decreased urinary stream, erectile dysfunction,  "urethral discharge  MUSCULOSKELETAL: NEGATIVE for significant arthralgias or myalgia  NEURO: NEGATIVE for weakness, dizziness or paresthesias  PSYCHIATRIC: NEGATIVE for changes in mood or affect    OBJECTIVE:   /70   Pulse 64   Temp 97.9  F (36.6  C) (Temporal)   Resp 16   Ht 1.68 m (5' 6.14\")   Wt 81.6 kg (180 lb)   SpO2 96%   BMI 28.93 kg/m      Physical Exam  GENERAL: healthy, alert and no distress  EYES: Eyes grossly normal to inspection, PERRL and conjunctivae and sclerae normal  HENT: ear canals and TM's normal, nose and mouth without ulcers or lesions  NECK: no adenopathy, no asymmetry, masses, or scars and thyroid normal to palpation  RESP: lungs clear to auscultation - no rales, rhonchi or wheezes  CV: regular rate and rhythm, normal S1 S2, no S3 or S4, no murmur, click or rub, no peripheral edema and peripheral pulses strong  ABDOMEN: soft, nontender, no hepatosplenomegaly, no masses and bowel sounds normal  MS: hammertoes on the L foot and are rubbing together with a callus formation on the 3rd toe at the distal metacarpal as it rubs on the 2nd toe  SKIN: no suspicious lesions or rashes  NEURO: Normal strength and tone, mentation intact and speech normal  PSYCH: mentation appears normal, affect normal/bright        ASSESSMENT/PLAN:       ICD-10-CM    1. Routine general medical examination at a health care facility  Z00.00       2. Benign prostatic hyperplasia with incomplete bladder emptying  N40.1 oxyBUTYnin ER (DITROPAN XL) 15 MG 24 hr tablet    R39.14       3. Psoriatic arthritis (H)  L40.50       4. Thrombocytopenia (H24)  D69.6 CBC with platelets     CBC with platelets      5. High risk medication use  Z79.899       6. Benign prostatic hyperplasia with urinary frequency  N40.1 PSA, screen    R35.0 PSA, screen      7. Hyperlipidemia with target LDL less than 130  E78.5 Lipid panel reflex to direct LDL Fasting     Comprehensive metabolic panel (BMP + Alb, Alk Phos, ALT, AST, Total. Bili, " TP)     Lipid panel reflex to direct LDL Fasting     Comprehensive metabolic panel (BMP + Alb, Alk Phos, ALT, AST, Total. Bili, TP)      8. Hammertoe of left foot  M20.42 Orthopedic  Referral      9. Obstructive sleep apnea- CPAP at 5 cm water  G47.33 CPAP/Comprehensive Sleep Order        1. Paperwork.  Paperwork was filled out for [employer] and approved    2. Hammertoe.  This can be problematic, but it wears and irritates. He was recommended to use brace or filler as able for a while to maintain it, but eventually he may need surgical management. Referred to podiatry. Though healing can be problematic with his psoriatic arthritis, so preference for conservative management as able.    3. Psoriatic arthritis.  Doing well on humira.    4. Thrombocytopenia.  His platelets are on the low end of normal for a while. It has been a year since we last checked, so we will check labs again.    5. Health maintenance.  He was recommended to get pneumonia, COVID-19, and RSV vaccines. He was provided with a printout on advanced directives and living pike.    Patient has been advised of split billing requirements and indicates understanding: Yes      COUNSELING:   Reviewed preventive health counseling, as reflected in patient instructions       Regular exercise       Healthy diet/nutrition        He reports that he has never smoked. He has never used smokeless tobacco.            Becki Fernandez MD, MD  [facility]

## 2023-11-29 NOTE — PROGRESS NOTES
DME (Durable Medical Equipment) Orders and Documentation  No orders of the defined types were placed in this encounter.     The patient was assessed and it was determined the patient is in need of the following listed DME Supplies/Equipment. Please complete supporting documentation below to demonstrate medical necessity.

## 2024-01-05 ENCOUNTER — OFFICE VISIT (OUTPATIENT)
Dept: PODIATRY | Facility: CLINIC | Age: 64
End: 2024-01-05
Payer: COMMERCIAL

## 2024-01-05 DIAGNOSIS — M20.42 HAMMERTOE OF LEFT FOOT: ICD-10-CM

## 2024-01-05 DIAGNOSIS — M79.672 LEFT FOOT PAIN: Primary | ICD-10-CM

## 2024-01-05 PROCEDURE — 99203 OFFICE O/P NEW LOW 30 MIN: CPT | Performed by: PODIATRIST

## 2024-01-05 NOTE — LETTER
1/5/2024         RE: Jaskaran Car  [address] [facility] [address]        Dear Colleague,    Thank you for referring your patient, Jaskaran Car, to the [facility]. Please see a copy of my visit note below.    Past Medical History:   Diagnosis Date     Arthritis     psoriatic     Chondromalacia of patella      Displacement of lumbar intervertebral disc without myelopathy 10/2002    L4/5 disc herniation with L4 nerve root impingement bilaterally     External hemorrhoids      Leukopenia 05/01/2013     Leukopenia      Obstructive sleep apnea- CPAP at 5 cm water 02/23/2012     Right ankle pain 06/2006    MRI Nov 2006 or so     Right knee pain summer 2007    MRI Nov 2008     Thrombocytopaenia 06/12/2013     Thrombocytopenia (H24)      Patient Active Problem List   Diagnosis     Displacement of lumbar intervertebral disc without myelopathy     Cholecystitis     Benign prostatic hyperplasia with urinary frequency     Advanced directives, counseling/discussion     Patellar tendinitis     Knee pain     Obstructive sleep apnea- CPAP at 5 cm water     Pain in joint, ankle and foot     Inverse psoriasis     High risk medications (not anticoagulants) long-term use     Psoriatic arthritis (H)     High risk medication use     Thrombocytopenia (H24)     Hyperlipidemia with target LDL less than 130     Impaired fasting glucose     Acute pharyngitis     Past Surgical History:   Procedure Laterality Date     ABDOMEN SURGERY       ARTHROSCOPY KNEE  08/30/2012    Procedure: ARTHROSCOPY KNEE;  Left knee arthroscopy, debridement and synovial biopsy;  Surgeon: Ivan Wiggins MD;  Location:  OR     CHOLECYSTECTOMY, LAPOROSCOPIC  12/21/2006     COLONOSCOPY  09/30/2008    neg, recheck in 10 years     COLONOSCOPY N/A 09/30/2019    Procedure: COLONOSCOPY;  Surgeon: Patrick Garcia MD;  Location:  GI     DAVINCI HERNIORRHAPHY INGUINAL Left 8/22/2022    Procedure:  Robot-assisted laparoscopic left inguinal hernia repair with mesh;  Surgeon: Jem Lucero DO;  Location: [facility] OR     EYE SURGERY       [facility] UGI ENDOSCOPY DIAG W OR W/O BRUSH/WASH  12/11/2006    normal     KNEE SURGERY       SOFT TISSUE SURGERY      FDL transfer     ZZC STRESS TEST - REGULAR ([place])  01/01/2006    negative stress thallium     Social History     Socioeconomic History     Marital status:      Spouse name: Reshma     Number of children: 2     Years of education: Not on file     Highest education level: Not on file   Occupational History     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[employer]     Comment: [place] [place] [employer]     Employer: [employer]   Tobacco Use     Smoking status: Never     Smokeless tobacco: Never     Tobacco comments:     no smokers in the household   Vaping Use     Vaping Use: Never used   Substance and Sexual Activity     Alcohol use: Yes     Comment: 4 to 5 drinks a week     Drug use: Never     Sexual activity: Not Currently     Partners: Female     Birth control/protection: Female Surgical     Comment: tubal ligation   Other Topics Concern      Service No     Blood Transfusions No     Caffeine Concern No     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     Exercise Yes     Comment: 2 times a week: yoga and hockey, stretching     Bike Helmet Yes     Seat Belt Yes     Self-Exams No     Parent/sibling w/ CABG, MI or angioplasty before 65F 55M? No   Social History Narrative     Not on file     Social Determinants of Health     Financial Resource Strain: Low Risk  (11/26/2023)    Financial Resource Strain      Within the past 12 months, have you or your family members you live with been unable to get utilities (heat, electricity) when it was really needed?: No   Food  Insecurity: Low Risk  (11/26/2023)    Food Insecurity      Within the past 12 months, did you worry that your food would run out before you got money to buy more?: No      Within the past 12 months, did the food you bought just not last and you didn t have money to get more?: No   Transportation Needs: Low Risk  (11/26/2023)    Transportation Needs      Within the past 12 months, has lack of transportation kept you from medical appointments, getting your medicines, non-medical meetings or appointments, work, or from getting things that you need?: No   Physical Activity: Not on file   Stress: Not on file   Social Connections: Not on file   Interpersonal Safety: Low Risk  (11/29/2023)    Interpersonal Safety      Do you feel physically and emotionally safe where you currently live?: Yes      Within the past 12 months, have you been hit, slapped, kicked or otherwise physically hurt by someone?: No      Within the past 12 months, have you been humiliated or emotionally abused in other ways by your partner or ex-partner?: No   Housing Stability: Low Risk  (11/26/2023)    Housing Stability      Do you have housing? : Yes      Are you worried about losing your housing?: No     Family History   Problem Relation Age of Onset     Lipids Father         medication     Coronary Artery Disease Father      Hypertension Father         medicaton     Hyperlipidemia Father      Cerebrovascular Disease Father         11/11/2020     Other Cancer Father         Lung  2020     Arthritis Mother         rheumatoid     Arthritis Sister      Asthma No family hx of      C.A.D. No family hx of      Diabetes No family hx of      Breast Cancer No family hx of      Cancer - colorectal No family hx of      Prostate Cancer No family hx of      Cancer No family hx of      Blood Disease No family hx of      Alzheimer Disease No family hx of      Cardiovascular No family hx of      Circulatory No family hx of      Eye Disorder No family hx of       Gastrointestinal Disease No family hx of      Genitourinary Problems No family hx of      Heart Disease No family hx of      Musculoskeletal Disorder No family hx of      Neurologic Disorder No family hx of      Respiratory No family hx of      Thyroid Disease No family hx of            Subjective findings 63-year-old presents from Becki Fernandez MD, I reviewed Dr. Ribeiro 11/29/2023 note, for left foot toe problems.  Relates he feels it started in the spring on the second toe can get a sore on it and then that cleared up, and then the third toe started getting a callus on it and hurting, relates to no injuries, relates it intermittently gets tender and pain in it at times, has used toe spacers, has custom foot orthotics from his chiropractor that he uses, wears steel toed boots at work and that seems to be when it bothers him the most.    Objective findings- DP and PT are 2 out of 4 left.  Has laterally deviated Hallux with dorsal medial first MPJ prominence, dorsally contracted digits second and third greater than third and fourth, hyperkeratotic tissue buildup medial third DIPJ, no erythema, no drainage, no edema, no calor, no pain on palpation, no gross tendon voids.  He has hyperkeratotic tissue buildup plantar medial first MPJ and hallux.    Assessment and plan- Hammertoes causing pain, Hallux valgus with Bunion left foot.  Diagnosis and treatment options discussed with the patient.  He is advised on stretching, toe spacers and shoe gear.  He is wearing custom foot orthotics.  Referral to surgery given and use discussed with them for any surgical correction.  Return to clinic and see me as needed.                  Low level of medical decision making.      Again, thank you for allowing me to participate in the care of your patient.        Sincerely,        Candido Plummer DPM

## 2024-01-05 NOTE — NURSING NOTE
Jaskaran Car's chief complaint for this visit includes:  Chief Complaint   Patient presents with    Consult     Philip on the left foot, last spring     PCP: Becki Fernandez    Referring Provider:  Becki Fernandez MD  [address] [place] [address]    There were no vitals taken for this visit.  Data Unavailable     Do you need any medication refills at today's visit? NO    Allergies   Allergen Reactions    Oxycontin [Oxycodone Hydrochloride-Polysorbate 80] Itching       Radha Cannon LPN

## 2024-01-05 NOTE — PROGRESS NOTES
Past Medical History:   Diagnosis Date    Arthritis     psoriatic    Chondromalacia of patella     Displacement of lumbar intervertebral disc without myelopathy 10/2002    L4/5 disc herniation with L4 nerve root impingement bilaterally    External hemorrhoids     Leukopenia 05/01/2013    Leukopenia     Obstructive sleep apnea- CPAP at 5 cm water 02/23/2012    Right ankle pain 06/2006    MRI Nov 2006 or so    Right knee pain summer 2007    MRI Nov 2008    Thrombocytopaenia 06/12/2013    Thrombocytopenia (H24)      Patient Active Problem List   Diagnosis    Displacement of lumbar intervertebral disc without myelopathy    Cholecystitis    Benign prostatic hyperplasia with urinary frequency    Advanced directives, counseling/discussion    Patellar tendinitis    Knee pain    Obstructive sleep apnea- CPAP at 5 cm water    Pain in joint, ankle and foot    Inverse psoriasis    High risk medications (not anticoagulants) long-term use    Psoriatic arthritis (H)    High risk medication use    Thrombocytopenia (H24)    Hyperlipidemia with target LDL less than 130    Impaired fasting glucose    Acute pharyngitis     Past Surgical History:   Procedure Laterality Date    ABDOMEN SURGERY      ARTHROSCOPY KNEE  08/30/2012    Procedure: ARTHROSCOPY KNEE;  Left knee arthroscopy, debridement and synovial biopsy;  Surgeon: Ivan Wiggins MD;  Location: [facility] OR    CHOLECYSTECTOMY, LAPOROSCOPIC  12/21/2006    COLONOSCOPY  09/30/2008    neg, recheck in 10 years    COLONOSCOPY N/A 09/30/2019    Procedure: COLONOSCOPY;  Surgeon: Patrick Garcia MD;  Location:  GI    DAVINCI HERNIORRHAPHY INGUINAL Left 8/22/2022    Procedure: Robot-assisted laparoscopic left inguinal hernia repair with mesh;  Surgeon: Jem Lucero DO;  Location:  OR    EYE SURGERY      [facility] UGI ENDOSCOPY DIAG W OR W/O BRUSH/WASH  12/11/2006    normal    KNEE SURGERY      SOFT TISSUE SURGERY      FDL transfer    ZZC STRESS TEST - REGULAR ([place])   01/01/2006    negative stress thallium     Social History     Socioeconomic History    Marital status:      Spouse name: Reshma    Number of children: 2    Years of education: Not on file    Highest education level: Not on file   Occupational History    Occupation: maintance     Comment: [employer]     Employer: [employer]   Tobacco Use    Smoking status: Never    Smokeless tobacco: Never    Tobacco comments:     no smokers in the household   Vaping Use    Vaping Use: Never used   Substance and Sexual Activity    Alcohol use: Yes     Comment: 4 to 5 drinks a week    Drug use: Never    Sexual activity: Not Currently     Partners: Female     Birth control/protection: Female Surgical     Comment: tubal ligation   Other Topics Concern     Service No    Blood Transfusions No    Caffeine Concern No    Occupational Exposure Yes     Comment: cleaning products    Hobby Hazards Yes     Comment: [place], 4 amb[employer]    Sleep Concern No    Stress Concern No    Weight Concern No    Special Diet Yes     Comment: low fat    Back Care Yes     Comment: herniated disc- back ex.    Exercise Yes     Comment: 2 times a week: yoga and hockey, stretching    Bike Helmet Yes    Seat Belt Yes    Self-Exams No    Parent/sibling w/ CABG, MI or angioplasty before 65F 55M? No   Social History Narrative    Not on file     Social Determinants of Health     Financial Resource Strain: Low Risk  (11/26/2023)    Financial Resource Strain     Within the past 12 months, have you or your family members you live with been unable to get utilities (heat, electricity) when it was really needed?: No   Food Insecurity: Low Risk  (11/26/2023)    Food Insecurity     Within the past 12 months, did you worry that your food would run out before you got money to buy more?: No     Within the past 12 months, did the food you bought just not last and you didn t have money to get more?: No   Transportation Needs: Low Risk  (11/26/2023)    Transportation  Needs     Within the past 12 months, has lack of transportation kept you from medical appointments, getting your medicines, non-medical meetings or appointments, work, or from getting things that you need?: No   Physical Activity: Not on file   Stress: Not on file   Social Connections: Not on file   Interpersonal Safety: Low Risk  (11/29/2023)    Interpersonal Safety     Do you feel physically and emotionally safe where you currently live?: Yes     Within the past 12 months, have you been hit, slapped, kicked or otherwise physically hurt by someone?: No     Within the past 12 months, have you been humiliated or emotionally abused in other ways by your partner or ex-partner?: No   Housing Stability: Low Risk  (11/26/2023)    Housing Stability     Do you have housing? : Yes     Are you worried about losing your housing?: No     Family History   Problem Relation Age of Onset    Lipids Father         medication    Coronary Artery Disease Father     Hypertension Father         medicaton    Hyperlipidemia Father     Cerebrovascular Disease Father         11/11/2020    Other Cancer Father         Lung  2020    Arthritis Mother         rheumatoid    Arthritis Sister     Asthma No family hx of     C.A.D. No family hx of     Diabetes No family hx of     Breast Cancer No family hx of     Cancer - colorectal No family hx of     Prostate Cancer No family hx of     Cancer No family hx of     Blood Disease No family hx of     Alzheimer Disease No family hx of     Cardiovascular No family hx of     Circulatory No family hx of     Eye Disorder No family hx of     Gastrointestinal Disease No family hx of     Genitourinary Problems No family hx of     Heart Disease No family hx of     Musculoskeletal Disorder No family hx of     Neurologic Disorder No family hx of     Respiratory No family hx of     Thyroid Disease No family hx of            Subjective findings 63-year-old presents from Becki Fernandez MD, I reviewed Dr. Ribeiro 11/29/2023  note, for left foot toe problems.  Relates he feels it started in the spring on the second toe can get a sore on it and then that cleared up, and then the third toe started getting a callus on it and hurting, relates to no injuries, relates it intermittently gets tender and pain in it at times, has used toe spacers, has custom foot orthotics from his chiropractor that he uses, wears steel toed boots at work and that seems to be when it bothers him the most.    Objective findings- DP and PT are 2 out of 4 left.  Has laterally deviated Hallux with dorsal medial first MPJ prominence, dorsally contracted digits second and third greater than third and fourth, hyperkeratotic tissue buildup medial third DIPJ, no erythema, no drainage, no edema, no calor, no pain on palpation, no gross tendon voids.  He has hyperkeratotic tissue buildup plantar medial first MPJ and hallux.    Assessment and plan- Hammertoes causing pain, Hallux valgus with Bunion left foot.  Diagnosis and treatment options discussed with the patient.  He is advised on stretching, toe spacers and shoe gear.  He is wearing custom foot orthotics.  Referral to surgery given and use discussed with them for any surgical correction.  Return to clinic and see me as needed.                  Low level of medical decision making.

## 2024-01-10 ENCOUNTER — ANCILLARY PROCEDURE (OUTPATIENT)
Dept: GENERAL RADIOLOGY | Facility: CLINIC | Age: 64
End: 2024-01-10
Attending: PODIATRIST
Payer: COMMERCIAL

## 2024-01-10 ENCOUNTER — OFFICE VISIT (OUTPATIENT)
Dept: PODIATRY | Facility: CLINIC | Age: 64
End: 2024-01-10
Payer: COMMERCIAL

## 2024-01-10 VITALS — HEIGHT: 66 IN | BODY MASS INDEX: 28.93 KG/M2 | WEIGHT: 180 LBS

## 2024-01-10 DIAGNOSIS — L40.50 PSORIATIC ARTHRITIS (H): ICD-10-CM

## 2024-01-10 DIAGNOSIS — M21.6X1 PRONATION OF BOTH FEET: ICD-10-CM

## 2024-01-10 DIAGNOSIS — M20.42 HAMMERTOE OF LEFT FOOT: Primary | ICD-10-CM

## 2024-01-10 DIAGNOSIS — M21.6X2 PRONATION OF BOTH FEET: ICD-10-CM

## 2024-01-10 DIAGNOSIS — M21.619 BUNION: ICD-10-CM

## 2024-01-10 DIAGNOSIS — M20.42 HAMMERTOE OF LEFT FOOT: ICD-10-CM

## 2024-01-10 PROCEDURE — 99204 OFFICE O/P NEW MOD 45 MIN: CPT | Performed by: PODIATRIST

## 2024-01-10 PROCEDURE — 73630 X-RAY EXAM OF FOOT: CPT | Mod: LT | Performed by: RADIOLOGY

## 2024-01-10 RX ORDER — ADALIMUMAB 40MG/0.4ML
40 KIT SUBCUTANEOUS
COMMUNITY
Start: 2023-12-18

## 2024-01-10 NOTE — PATIENT INSTRUCTIONS
We wish you continued good healing. If you have any questions or concerns, please do not hesitate to contact us at  [phone]    Echo itt (secure e-mail communication and access to your chart) to send a message or to make an appointment.    Please remember to call and schedule a follow up appointment if one was recommended at your earliest convenience.     PODIATRY CLINIC HOURS  TELEPHONE NUMBER    Dr. Junior BROWNPNATHANIEL FACFAS        Clinics:  [facility]   [place]  Tuesday 1PM-6PM  [place]  Wednesday 745AM-330PM  [facility]  Monday 2nd,4th  830AM-4PM  Thursday/Friday 745AM-230PM     [place] APPOINTMENTS  [phone]    [place] APPOINTMENTS  [phone]          If you need a medication refill, please contact us you may need lab work and/or a follow up visit prior to your refill (i.e. Antifungal medications).  If MRI needed please call Imaging at [phone]   HOW DO I GET MY KNEE SCOOTER? Knee scooters can be picked up at ANY Medical Supply stores with your knee scooter Prescription.  OR  Bring your signed prescription to an [facility] Medical Equipment showroom.   Set up an appointment for your custom Orthotics. Call any Orthotics locations call [phone]

## 2024-01-10 NOTE — LETTER
1/10/2024         RE: Jaskaran Car  [address] [facility] [address]        Dear Colleague,    Thank you for referring your patient, Jaskaran Car, to the [facility]. Please see a copy of my visit note below.    S:  Patient seen today in consult from Dr. Gonzalez and complains of painful left toes.  This started in May 2023.  Started on his second toe first.  He notices more elevated.  It got raw and open on the PIPJ.  This healed up.  He is getting pain in his medial fourth toe.  Aggravated by activity and relieved by rest.  Patient has psoriatic arthritis.  He takes Humira for this.  Prednisone 5 mg daily.  Works as an  wearing steel toed boots.  Patient has foot level layers from chiropractor and steel toed boots.  His boots are over a-year-old.  When patient walks barefoot no pain at all.  Patient has never smoked.  Mother had history of rheumatoid arthritis.      ROS:  A 10-point review of systems was performed and is positive for that noted in the HPI and as seen below.  All other areas are negative    Past Medical History:   Diagnosis Date     Arthritis     psoriatic     Chondromalacia of patella      Displacement of lumbar intervertebral disc without myelopathy 10/2002    L4/5 disc herniation with L4 nerve root impingement bilaterally     External hemorrhoids      Leukopenia 05/01/2013     Leukopenia      Obstructive sleep apnea- CPAP at 5 cm water 02/23/2012     Right ankle pain 06/2006    MRI Nov 2006 or so     Right knee pain summer 2007    MRI Nov 2008     Thrombocytopaenia 06/12/2013     Thrombocytopenia (H24)        Past Surgical History:   Procedure Laterality Date     ABDOMEN SURGERY       ARTHROSCOPY KNEE  08/30/2012    Procedure: ARTHROSCOPY KNEE;  Left knee arthroscopy, debridement and synovial biopsy;  Surgeon: Ivan Wiggins MD;  Location: [facility] OR     CHOLECYSTECTOMY, LAPOROSCOPIC  12/21/2006     COLONOSCOPY  09/30/2008    neg,  recheck in 10 years     COLONOSCOPY N/A 09/30/2019    Procedure: COLONOSCOPY;  Surgeon: Patrick Garcia MD;  Location: [facility]     DAVINCI HERNIORRHAPHY INGUINAL Left 8/22/2022    Procedure: Robot-assisted laparoscopic left inguinal hernia repair with mesh;  Surgeon: Jem Lucero DO;  Location: [facility] OR     EYE SURGERY       [facility] UGI ENDOSCOPY DIAG W OR W/O BRUSH/WASH  12/11/2006    normal     KNEE SURGERY       SOFT TISSUE SURGERY      FDL transfer     ZZC STRESS TEST - REGULAR ([place])  01/01/2006    negative stress thallium       Family History   Problem Relation Age of Onset     Lipids Father         medication     Coronary Artery Disease Father      Hypertension Father         medicaton     Hyperlipidemia Father      Cerebrovascular Disease Father         11/11/2020     Other Cancer Father         Lung  2020     Arthritis Mother         rheumatoid     Arthritis Sister      Asthma No family hx of      C.A.D. No family hx of      Diabetes No family hx of      Breast Cancer No family hx of      Cancer - colorectal No family hx of      Prostate Cancer No family hx of      Cancer No family hx of      Blood Disease No family hx of      Alzheimer Disease No family hx of      Cardiovascular No family hx of      Circulatory No family hx of      Eye Disorder No family hx of      Gastrointestinal Disease No family hx of      Genitourinary Problems No family hx of      Heart Disease No family hx of      Musculoskeletal Disorder No family hx of      Neurologic Disorder No family hx of      Respiratory No family hx of      Thyroid Disease No family hx of        Social History     Tobacco Use     Smoking status: Never     Smokeless tobacco: Never     Tobacco comments:     no smokers in the household   Substance Use Topics     Alcohol use: Yes     Comment: 4 to 5 drinks a week         Current Outpatient Medications:      HUMIRA *CF* PEN 40 MG/0.4ML pen kit, Inject 40 mg Subcutaneous, Disp: , Rfl:      Cholecalciferol  "(VITAMIN D3 PO), Take 5,000 Units by mouth daily, Disp: , Rfl:      HUMIRA PEN 40 MG/0.8ML pen kit, every 14 days, Disp: , Rfl: 0     MAGNESIUM PO, Take by mouth daily, Disp: , Rfl:      Omega-3 Fatty Acids (FISH OIL) 1000 MG CPDR, Take  by mouth daily., Disp: , Rfl:      order for DME, Equipment ordered: RESMED Auto PAP Mask type: Nasal Settings: 5-10cm H2O, Disp: , Rfl:      oxyBUTYnin ER (DITROPAN XL) 15 MG 24 hr tablet, Take 1 tablet (15 mg) by mouth daily, Disp: 90 tablet, Rfl: 1     predniSONE (DELTASONE) 5 MG tablet, Take 5 mg by mouth daily, Disp: , Rfl: 0     Zinc 15 MG CAPS, Take 1 tablet by mouth daily, Disp: 30 capsule, Rfl:        Allergies   Allergen Reactions     Oxycontin [Oxycodone Hydrochloride-Polysorbate 80] Itching        O:      Vitals: Ht 1.676 m (5' 6\")   Wt 81.6 kg (180 lb)   BMI 29.05 kg/m      Constitutional/ general:  Pt is in no apparent distress, appears well-nourished.  Cooperative with history and physical exam.     Psych:  The patient answered questions appropriately.  Normal affect.  Seems to have reasonable expectations, in terms of treatment.     Eyes:  Visual scanning/ tracking without deficit.     Ears:  Response to auditory stimuli is normal.  negative hearing aid devices.  Auricles in proper alignment.     Lymphatic:  Popliteal lymph nodes not enlarged.     Lungs:  Non labored breathing, non labored speech. No cough.  No audible wheezing. Even, quiet breathing.      Vascular:  Pedal pulses are palpable bilaterally for both the DP and PT arteries.  CFT < 3 sec.  negative edema.  negative varicosities.  Pedal hair growth noted.     Neuro:  Alert and oriented x 3. Coordinated gait.  Light touch sensation is intact to the L4, L5, S1 distributions. No obvious deficits.  No evidence of neurological-based weakness, spasticity, or contracture in the lower extremities.  Achilles reflexes equal and symmetrical.       Derm: Normal texture and turgor.  No erythema, ecchymosis, or " cyanosis.  No open lesions.     Musculoskeletal:    Lower extremity muscle strength is normal.  Patient is ambulatory without an assistive device or brace.    With weightbearing pronated arch.  Right bunion deformity noted.  Normal range of motion first MTPJ and this is slightly track bound.  Second toe hammered.  I can reduce this.  No pain at the PIPJ.  Callus medial third toe DIPJ.  Healthy tissue with noted with debridement.  No underlying masses.    Radiographic Exam:  X-Ray Findings:  I personally reviewed the films.  Consistent with above findings    A:    Psoriatic arthritis   Pronation  Bunion deformity  Hammertoes with pain    P:  X-rays taken today of left foot.  Reviewed past left foot x-rays.  Reviewed Dr. Gonzalez's recent note and recent rheumatology notes.  Explained to patient how pronated foot causes more stress on forefoot and toes.  Discussed with inflammatory arthritis also more prone to deformities.  His shoes that are quite malleable.  Discussed stiff supportive shoe and I made suggestions.  Also discussed over-the-counter arch supports.  Discussed orthotics as well.  Wrote a prescription for these.  I made suggestions on good how shoes to help keep forefoot offloaded.  If work boots old and malleable will consider stiffer work boot.  Orthotics and work boots could also be helpful.  Instructed patient on taping down second toe or using Budin splint to offload forefoot deformities.  Discussed range of motion of toes to keep the supple.  We discussed surgery today.  Discussed he would need head osteotomy and PIPJ fusion of second toe.  Discussed recovery.  He is at higher risk for complications because of his psoriatic arthritis and the medications he is taking.  Will start with conservative treatments first.  RTC as needed.  Thank you for allowing me participate in the care of this patient.        Junior Dykes DPM, FACFAS            Again, thank you for allowing me to participate in the care  of your patient.        Sincerely,        Junior yDkes DPM

## 2024-01-10 NOTE — PROGRESS NOTES
S:  Patient seen today in consult from Dr. Gonzalez and complains of painful left toes.  This started in May 2023.  Started on his second toe first.  He notices more elevated.  It got raw and open on the PIPJ.  This healed up.  He is getting pain in his medial fourth toe.  Aggravated by activity and relieved by rest.  Patient has psoriatic arthritis.  He takes Humira for this.  Prednisone 5 mg daily.  Works as an  wearing steel toed boots.  Patient has foot level layers from chiropractor and steel toed boots.  His boots are over a-year-old.  When patient walks barefoot no pain at all.  Patient has never smoked.  Mother had history of rheumatoid arthritis.      ROS:  A 10-point review of systems was performed and is positive for that noted in the HPI and as seen below.  All other areas are negative    Past Medical History:   Diagnosis Date    Arthritis     psoriatic    Chondromalacia of patella     Displacement of lumbar intervertebral disc without myelopathy 10/2002    L4/5 disc herniation with L4 nerve root impingement bilaterally    External hemorrhoids     Leukopenia 05/01/2013    Leukopenia     Obstructive sleep apnea- CPAP at 5 cm water 02/23/2012    Right ankle pain 06/2006    MRI Nov 2006 or so    Right knee pain summer 2007    MRI Nov 2008    Thrombocytopaenia 06/12/2013    Thrombocytopenia (H24)        Past Surgical History:   Procedure Laterality Date    ABDOMEN SURGERY      ARTHROSCOPY KNEE  08/30/2012    Procedure: ARTHROSCOPY KNEE;  Left knee arthroscopy, debridement and synovial biopsy;  Surgeon: Ivan Wiggins MD;  Location:  OR    CHOLECYSTECTOMY, LAPOROSCOPIC  12/21/2006    COLONOSCOPY  09/30/2008    neg, recheck in 10 years    COLONOSCOPY N/A 09/30/2019    Procedure: COLONOSCOPY;  Surgeon: Patrick Garcia MD;  [facility]    [facility]    Procedure: Robot-assisted laparoscopic left inguinal hernia repair with mesh;  Surgeon:  Jem Lucero, DO;  Location: [facility] OR    EYE SURGERY      [facility] UGI ENDOSCOPY DIAG W OR W/O BRUSH/WASH  12/11/2006    normal    KNEE SURGERY      SOFT TISSUE SURGERY      FDL transfer    ZZC STRESS TEST - REGULAR ([place])  01/01/2006    negative stress thallium       Family History   Problem Relation Age of Onset    Lipids Father         medication    Coronary Artery Disease Father     Hypertension Father         medicaton    Hyperlipidemia Father     Cerebrovascular Disease Father         11/11/2020    Other Cancer Father         Lung  2020    Arthritis Mother         rheumatoid    Arthritis Sister     Asthma No family hx of     C.A.D. No family hx of     Diabetes No family hx of     Breast Cancer No family hx of     Cancer - colorectal No family hx of     Prostate Cancer No family hx of     Cancer No family hx of     Blood Disease No family hx of     Alzheimer Disease No family hx of     Cardiovascular No family hx of     Circulatory No family hx of     Eye Disorder No family hx of     Gastrointestinal Disease No family hx of     Genitourinary Problems No family hx of     Heart Disease No family hx of     Musculoskeletal Disorder No family hx of     Neurologic Disorder No family hx of     Respiratory No family hx of     Thyroid Disease No family hx of        Social History     Tobacco Use    Smoking status: Never    Smokeless tobacco: Never    Tobacco comments:     no smokers in the household   Substance Use Topics    Alcohol use: Yes     Comment: 4 to 5 drinks a week         Current Outpatient Medications:     HUMIRA *CF* PEN 40 MG/0.4ML pen kit, Inject 40 mg Subcutaneous, Disp: , Rfl:     Cholecalciferol (VITAMIN D3 PO), Take 5,000 Units by mouth daily, Disp: , Rfl:     HUMIRA PEN 40 MG/0.8ML pen kit, every 14 days, Disp: , Rfl: 0    MAGNESIUM PO, Take by mouth daily, Disp: , Rfl:     Omega-3 Fatty Acids (FISH OIL) 1000 MG CPDR, Take  by mouth daily., Disp: , Rfl:     order for DME, Equipment ordered: RESMED  "Auto PAP Mask type: Nasal Settings: 5-10cm H2O, Disp: , Rfl:     oxyBUTYnin ER (DITROPAN XL) 15 MG 24 hr tablet, Take 1 tablet (15 mg) by mouth daily, Disp: 90 tablet, Rfl: 1    predniSONE (DELTASONE) 5 MG tablet, Take 5 mg by mouth daily, Disp: , Rfl: 0    Zinc 15 MG CAPS, Take 1 tablet by mouth daily, Disp: 30 capsule, Rfl:        Allergies   Allergen Reactions    Oxycontin [Oxycodone Hydrochloride-Polysorbate 80] Itching        O:      Vitals: Ht 1.676 m (5' 6\")   Wt 81.6 kg (180 lb)   BMI 29.05 kg/m      Constitutional/ general:  Pt is in no apparent distress, appears well-nourished.  Cooperative with history and physical exam.     Psych:  The patient answered questions appropriately.  Normal affect.  Seems to have reasonable expectations, in terms of treatment.     Eyes:  Visual scanning/ tracking without deficit.     Ears:  Response to auditory stimuli is normal.  negative hearing aid devices.  Auricles in proper alignment.     Lymphatic:  Popliteal lymph nodes not enlarged.     Lungs:  Non labored breathing, non labored speech. No cough.  No audible wheezing. Even, quiet breathing.      Vascular:  Pedal pulses are palpable bilaterally for both the DP and PT arteries.  CFT < 3 sec.  negative edema.  negative varicosities.  Pedal hair growth noted.     Neuro:  Alert and oriented x 3. Coordinated gait.  Light touch sensation is intact to the L4, L5, S1 distributions. No obvious deficits.  No evidence of neurological-based weakness, spasticity, or contracture in the lower extremities.  Achilles reflexes equal and symmetrical.       Derm: Normal texture and turgor.  No erythema, ecchymosis, or cyanosis.  No open lesions.     Musculoskeletal:    Lower extremity muscle strength is normal.  Patient is ambulatory without an assistive device or brace.    With weightbearing pronated arch.  Right bunion deformity noted.  Normal range of motion first MTPJ and this is slightly track bound.  Second toe hammered.  I can " reduce this.  No pain at the PIPJ.  Callus medial third toe DIPJ.  Healthy tissue with noted with debridement.  No underlying masses.    Radiographic Exam:  X-Ray Findings:  I personally reviewed the films.  Consistent with above findings    A:    Psoriatic arthritis   Pronation  Bunion deformity  Hammertoes with pain    P:  X-rays taken today of left foot.  Reviewed past left foot x-rays.  Reviewed Dr. Gonzalez's recent note and recent rheumatology notes.  Explained to patient how pronated foot causes more stress on forefoot and toes.  Discussed with inflammatory arthritis also more prone to deformities.  His shoes that are quite malleable.  Discussed stiff supportive shoe and I made suggestions.  Also discussed over-the-counter arch supports.  Discussed orthotics as well.  Wrote a prescription for these.  I made suggestions on good how shoes to help keep forefoot offloaded.  If work boots old and malleable will consider stiffer work boot.  Orthotics and work boots could also be helpful.  Instructed patient on taping down second toe or using Budin splint to offload forefoot deformities.  Discussed range of motion of toes to keep the supple.  We discussed surgery today.  Discussed he would need head osteotomy and PIPJ fusion of second toe.  Discussed recovery.  He is at higher risk for complications because of his psoriatic arthritis and the medications he is taking.  Will start with conservative treatments first.  RTC as needed.  Thank you for allowing me participate in the care of this patient.        Junior Dykes DPM, FACFAS

## 2024-02-26 ENCOUNTER — HOSPITAL ENCOUNTER (EMERGENCY)
Facility: CLINIC | Age: 64
Discharge: HOME | End: 2024-02-26
Attending: PHYSICIAN ASSISTANT | Admitting: PHYSICIAN ASSISTANT
Payer: COMMERCIAL

## 2024-02-26 ENCOUNTER — APPOINTMENT (OUTPATIENT)
Dept: GENERAL RADIOLOGY | Facility: CLINIC | Age: 64
End: 2024-02-26
Attending: PHYSICIAN ASSISTANT
Payer: COMMERCIAL

## 2024-02-26 ENCOUNTER — NURSE TRIAGE (OUTPATIENT)
Dept: FAMILY MEDICINE | Facility: OTHER | Age: 64
End: 2024-02-26
Payer: COMMERCIAL

## 2024-02-26 VITALS
SYSTOLIC BLOOD PRESSURE: 141 MMHG | OXYGEN SATURATION: 97 % | BODY MASS INDEX: 29.89 KG/M2 | DIASTOLIC BLOOD PRESSURE: 70 MMHG | HEART RATE: 77 BPM | RESPIRATION RATE: 20 BRPM | WEIGHT: 186 LBS | TEMPERATURE: 98.1 F | HEIGHT: 66 IN

## 2024-02-26 DIAGNOSIS — J20.9 ACUTE BRONCHITIS: ICD-10-CM

## 2024-02-26 LAB
FLUAV RNA SPEC QL NAA+PROBE: NEGATIVE
FLUBV RNA RESP QL NAA+PROBE: NEGATIVE
RSV RNA SPEC NAA+PROBE: NEGATIVE
SARS-COV-2 RNA RESP QL NAA+PROBE: NEGATIVE

## 2024-02-26 PROCEDURE — 87637 SARSCOV2&INF A&B&RSV AMP PRB: CPT | Performed by: PHYSICIAN ASSISTANT

## 2024-02-26 PROCEDURE — 99284 EMERGENCY DEPT VISIT MOD MDM: CPT | Performed by: PHYSICIAN ASSISTANT

## 2024-02-26 PROCEDURE — 94640 AIRWAY INHALATION TREATMENT: CPT

## 2024-02-26 PROCEDURE — 99284 EMERGENCY DEPT VISIT MOD MDM: CPT | Mod: 25 | Performed by: PHYSICIAN ASSISTANT

## 2024-02-26 PROCEDURE — 71046 X-RAY EXAM CHEST 2 VIEWS: CPT

## 2024-02-26 PROCEDURE — 250N000009 HC RX 250: Performed by: PHYSICIAN ASSISTANT

## 2024-02-26 RX ORDER — ALBUTEROL SULFATE 90 UG/1
2 AEROSOL, METERED RESPIRATORY (INHALATION) EVERY 6 HOURS PRN
Qty: 18 G | Refills: 0 | Status: SHIPPED | OUTPATIENT
Start: 2024-02-26

## 2024-02-26 RX ORDER — IPRATROPIUM BROMIDE AND ALBUTEROL SULFATE 2.5; .5 MG/3ML; MG/3ML
3 SOLUTION RESPIRATORY (INHALATION) ONCE
Status: COMPLETED | OUTPATIENT
Start: 2024-02-26 | End: 2024-02-26

## 2024-02-26 RX ORDER — PREDNISONE 20 MG/1
TABLET ORAL
Qty: 10 TABLET | Refills: 0 | Status: SHIPPED | OUTPATIENT
Start: 2024-02-26 | End: 2024-05-10

## 2024-02-26 RX ORDER — AZITHROMYCIN 250 MG/1
TABLET, FILM COATED ORAL
Qty: 6 TABLET | Refills: 0 | Status: SHIPPED | OUTPATIENT
Start: 2024-02-26 | End: 2024-03-02

## 2024-02-26 RX ADMIN — IPRATROPIUM BROMIDE AND ALBUTEROL SULFATE 3 ML: .5; 3 SOLUTION RESPIRATORY (INHALATION) at 18:14

## 2024-02-26 ASSESSMENT — COLUMBIA-SUICIDE SEVERITY RATING SCALE - C-SSRS
6. HAVE YOU EVER DONE ANYTHING, STARTED TO DO ANYTHING, OR PREPARED TO DO ANYTHING TO END YOUR LIFE?: NO
2. HAVE YOU ACTUALLY HAD ANY THOUGHTS OF KILLING YOURSELF IN THE PAST MONTH?: NO
1. IN THE PAST MONTH, HAVE YOU WISHED YOU WERE DEAD OR WISHED YOU COULD GO TO SLEEP AND NOT WAKE UP?: NO

## 2024-02-26 ASSESSMENT — ACTIVITIES OF DAILY LIVING (ADL)
ADLS_ACUITY_SCORE: 35
ADLS_ACUITY_SCORE: 35

## 2024-02-26 NOTE — ED TRIAGE NOTES
Pt presents with concerns of a cough that he states that he has had since 1/5/2024.  Pt states that it is getting worse.  Describes as a dry cough.      Triage Assessment (Adult)       Row Name 02/26/24 1730 02/26/24 9983       Triage Assessment    Airway WDL WDL --       Respiratory WDL    Respiratory WDL X;cough --    Cough Frequency frequent --    Cough Type dry --       Skin Circulation/Temperature WDL    Skin Circulation/Temperature WDL WDL --       Cardiac WDL    Cardiac WDL WDL --       Peripheral/Neurovascular WDL    Peripheral Neurovascular WDL WDL --       Cognitive/Neuro/Behavioral WDL    Cognitive/Neuro/Behavioral WDL WDL --

## 2024-02-26 NOTE — TELEPHONE ENCOUNTER
Nurse Triage SBAR    Is this a 2nd Level Triage? NO    Situation: patient feeling short of breath    Background: patient has had a cough for a few weeks.Afebrile. Denies chest pain. Noted yesterday that he was starting to fell a little short of breath. Today breathing is worse and patient notes some wheezing.     Assessment: patient feel short of breath with exertion and hears wheezing when he breaths. States it seems to be getting worse as they day progresses. Denies chest pain but reports his chest feels full.     Protocol Recommended Disposition:   Go To Office Now    Recommendation: patient will go to  as there are no appts available.          Does the patient meet one of the following criteria for ADS visit consideration? No  Reason for Disposition   MILD difficulty breathing (e.g., minimal/no SOB at rest, SOB with walking, pulse < 100) of new-onset or worse than normal    Additional Information   Negative: Chest pain   Negative: Wheezing (high pitched whistling sound) and previous asthma attacks or use of asthma medicines   Negative: Difficulty breathing and within 14 days of COVID-19 Exposure   Negative: Difficulty breathing and only present when coughing   Negative: Difficulty breathing and only from stuffy nose   Negative: Difficulty breathing and only from stuffy nose or runny nose from common cold   Negative: SEVERE difficulty breathing (e.g., struggling for each breath, speaks in single words, pulse > 120)   Negative: Breathing stopped and hasn't returned   Negative: Choking on something   Negative: Bluish (or gray) lips or face   Negative: Difficult to awaken or acting confused (e.g., disoriented, slurred speech)   Negative: Passed out (i.e., fainted, collapsed and was not responding)   Negative: Wheezing started suddenly after medicine, an allergic food, or bee sting   Negative: Stridor (harsh sound while breathing in)   Negative: Slow, shallow and weak breathing   Negative: Sounds like a  "life-threatening emergency to the triager   Negative: Fever > 103 F (39.4 C)   Negative: Fever > 101 F (38.3 C) and over 60 years of age   Negative: Fever > 100.0 F (37.8 C) and bedridden (e.g., nursing home patient, stroke, chronic illness, recovering from surgery)   Negative: Fever > 100.0 F (37.8 C) and diabetes mellitus or weak immune system (e.g., HIV positive, cancer chemo, splenectomy, organ transplant, chronic steroids)   Negative: Periods where breathing stops and then resumes normally and bedridden (e.g., nursing home patient, CVA)   Negative: Pregnant or postpartum (from 0 to 6 weeks after delivery)   Negative: Patient sounds very sick or weak to the triager   Negative: MODERATE difficulty breathing (e.g., speaks in phrases, SOB even at rest, pulse 100-120) of new-onset or worse than normal   Negative: Oxygen level (e.g., pulse oximetry) 90% or lower   Negative: Wheezing can be heard across the room   Negative: Drooling or spitting out saliva (because can't swallow)   Negative: Any history of prior \"blood clot\" in leg or lungs   Negative: Illness requiring prolonged bedrest in past month (e.g., immobilization, long hospital stay)   Negative: Hip or leg fracture (broken bone) in past month (or had cast on leg or ankle in past month)   Negative: Major surgery in the past month   Negative: Long-distance travel in past month (e.g., car, bus, train, plane; with trip lasting 6 or more hours)   Negative: Cancer treatment in past six months (or has cancer now)   Negative: Extra heartbeats, irregular heart beating, or heart is beating very fast (i.e., 'palpitations')    Protocols used: Breathing Difficulty-A-OH    "

## 2024-02-26 NOTE — ED PROVIDER NOTES
History     Chief Complaint   Patient presents with    Cough       HPI  Jaskaran Car is a 63 year old male who presents to the emergency department complaining of a cough. The patient reports around the middle of January he developed a cough.  It was not severe and actually had gotten better.  The last 2 days however he developed a worsening cough.  He describes it as dry but it feels like there is stuff in his chest he needs to cough up.  He tried some Mucinex to see if that would help break things up but it did not help much.  He has not had any fevers or bodyaches.  No significant congestion or sore throat.  He is on immunosuppression for his psoriatic arthritis.        Allergies:  Allergies   Allergen Reactions    Oxycontin [Oxycodone Hydrochloride-Polysorbate 80] Itching       Problem List:    Patient Active Problem List    Diagnosis Date Noted    High risk medication use 06/12/2013     Priority: High    Thrombocytopenia (H24) 06/12/2013     Priority: High     Diagnosis updated by automated process. Provider to review and confirm.      Psoriatic arthritis (H) 12/21/2012     Priority: High    Acute pharyngitis 08/12/2015     Priority: Medium    Impaired fasting glucose 07/13/2015     Priority: Medium    Hyperlipidemia with target LDL less than 130 07/08/2013     Priority: Medium     Diagnosis updated by automated process. Provider to review and confirm.      High risk medications (not anticoagulants) long-term use 12/21/2012     Priority: Medium    Inverse psoriasis 06/26/2012     Priority: Medium    Pain in joint, ankle and foot 04/18/2012     Priority: Medium    Obstructive sleep apnea- CPAP at 5 cm water 02/23/2012     Priority: Medium    Patellar tendinitis 09/20/2011     Priority: Medium    Knee pain 09/20/2011     Priority: Medium    Advanced directives, counseling/discussion 06/09/2011     Priority: Medium     Health care directive mailed to patient. Recommended that he makes a copy for his medical  record.       Benign prostatic hyperplasia with urinary frequency 03/17/2008     Priority: Medium    Cholecystitis 11/28/2006     Priority: Medium     Problem list name updated by automated process. Provider to review      Displacement of lumbar intervertebral disc without myelopathy 12/23/2002     Priority: Medium     L4/5          Past Medical History:    Past Medical History:   Diagnosis Date    Arthritis     Chondromalacia of patella     Displacement of lumbar intervertebral disc without myelopathy 10/2002    External hemorrhoids     Leukopenia 05/01/2013    Leukopenia     Obstructive sleep apnea- CPAP at 5 cm water 02/23/2012    Right ankle pain 06/2006    Right knee pain summer 2007    Thrombocytopaenia 06/12/2013    Thrombocytopenia (H24)        Past Surgical History:    Past Surgical History:   Procedure Laterality Date    ABDOMEN SURGERY      ARTHROSCOPY KNEE  08/30/2012    Procedure: ARTHROSCOPY KNEE;  Left knee arthroscopy, debridement and synovial biopsy;  Surgeon: Ivan Wiggins MD;  Location: [facility] OR    CHOLECYSTECTOMY, LAPOROSCOPIC  12/21/2006    COLONOSCOPY  09/30/2008    neg, recheck in 10 years    COLONOSCOPY N/A 09/30/2019    Procedure: COLONOSCOPY;  Surgeon: Patrick Garcia MD;  Location:  GI    DAVINCI HERNIORRHAPHY INGUINAL Left 8/22/2022    Procedure: Robot-assisted laparoscopic left inguinal hernia repair with mesh;  Surgeon: Jem Lucero DO;  Location:  OR    EYE SURGERY      [facility] UGI ENDOSCOPY DIAG W OR W/O BRUSH/WASH  12/11/2006    normal    KNEE SURGERY      SOFT TISSUE SURGERY      FDL transfer    ZZC STRESS TEST - REGULAR ([place])  01/01/2006    negative stress thallium       Family History:    Family History   Problem Relation Age of Onset    Lipids Father         medication    Coronary Artery Disease Father     Hypertension Father         medicaton    Hyperlipidemia Father     Cerebrovascular Disease Father         11/11/2020    Other Cancer Father          "Lung  2020    Arthritis Mother         rheumatoid    Arthritis Sister     Asthma No family hx of     C.A.D. No family hx of     Diabetes No family hx of     Breast Cancer No family hx of     Cancer - colorectal No family hx of     Prostate Cancer No family hx of     Cancer No family hx of     Blood Disease No family hx of     Alzheimer Disease No family hx of     Cardiovascular No family hx of     Circulatory No family hx of     Eye Disorder No family hx of     Gastrointestinal Disease No family hx of     Genitourinary Problems No family hx of     Heart Disease No family hx of     Musculoskeletal Disorder No family hx of     Neurologic Disorder No family hx of     Respiratory No family hx of     Thyroid Disease No family hx of        Social History:  Marital Status:   [2]  Social History     Tobacco Use    Smoking status: Never    Smokeless tobacco: Never    Tobacco comments:     no smokers in the household   Vaping Use    Vaping Use: Never used   Substance Use Topics    Alcohol use: Yes     Comment: 4 to 5 drinks a week    Drug use: Never        Medications:    albuterol (VENTOLIN HFA) 108 (90 Base) MCG/ACT inhaler  azithromycin (ZITHROMAX) 250 MG tablet  predniSONE (DELTASONE) 20 MG tablet  Cholecalciferol (VITAMIN D3 PO)  HUMIRA *CF* PEN 40 MG/0.4ML pen kit  HUMIRA PEN 40 MG/0.8ML pen kit  MAGNESIUM PO  Omega-3 Fatty Acids (FISH OIL) 1000 MG CPDR  order for DME  oxyBUTYnin ER (DITROPAN XL) 15 MG 24 hr tablet  predniSONE (DELTASONE) 5 MG tablet  Zinc 15 MG CAPS          Review of Systems   All other systems reviewed and are negative.        Physical Exam   BP: (!) 143/103  Pulse: 60  Temp: 98.1  F (36.7  C)  Resp: 18  Height: 167.6 cm (5' 6\")  Weight: 84.4 kg (186 lb)  SpO2: 97 %      Physical Exam  Vitals and nursing note reviewed.   Constitutional:       General: He is not in acute distress.     Appearance: Normal appearance. He is well-developed. He is not toxic-appearing or diaphoretic.   HENT:      " Head: Normocephalic and atraumatic.      Nose: Nose normal.      Mouth/Throat:      Mouth: Mucous membranes are moist.      Pharynx: Oropharynx is clear.   Eyes:      Conjunctiva/sclera: Conjunctivae normal.      Pupils: Pupils are equal, round, and reactive to light.   Cardiovascular:      Rate and Rhythm: Normal rate and regular rhythm.      Heart sounds: Normal heart sounds.   Pulmonary:      Effort: Pulmonary effort is normal. No respiratory distress.      Breath sounds: Normal breath sounds.      Comments: Frequent harsh, dry, wheezy sounding cough  Abdominal:      General: Bowel sounds are normal. There is no distension.      Palpations: Abdomen is soft.      Tenderness: There is no abdominal tenderness.   Musculoskeletal:         General: No deformity.      Cervical back: Neck supple.   Skin:     General: Skin is warm and dry.   Neurological:      General: No focal deficit present.      Mental Status: He is alert and oriented to person, place, and time. Mental status is at baseline.      Coordination: Coordination normal.   Psychiatric:         Mood and Affect: Mood normal.           ED Course          Procedures      Results for orders placed or performed during the hospital encounter of 02/26/24 (from the past 24 hour(s))   Symptomatic Influenza A/B, RSV, & SARS-CoV2 PCR (COVID-19) Nasopharyngeal    Specimen: Nasopharyngeal; Swab   Result Value Ref Range    Influenza A PCR Negative Negative    Influenza B PCR Negative Negative    RSV PCR Negative Negative    SARS CoV2 PCR Negative Negative    Narrative    Testing was performed using the Xpert Xpress CoV2/Flu/RSV Assay on the [employer] GeneXpert Instrument. This test should be ordered for the detection of SARS-CoV-2, influenza, and RSV viruses in individuals who meet clinical and/or epidemiological criteria. Test performance is unknown in asymptomatic patients. This test is for in vitro diagnostic use under the FDA EUA for laboratories certified under CLIA to  perform high or moderate complexity testing. This test has not been FDA cleared or approved. A negative result does not rule out the presence of PCR inhibitors in the specimen or target RNA in concentration below the limit of detection for the assay. If only one viral target is positive but coinfection with multiple targets is suspected, the sample should be re-tested with another FDA cleared, approved, or authorized test, if coinfection would change clinical management. This test was validated by the [facility]. These laboratories are certified under the Clinical Laboratory Improvement Amendments of 1988 (CLIA-88) as qualified to perform high complexity laboratory testing.   XR Chest 2 Views    Narrative    EXAM: XR CHEST 2 VIEWS  LOCATION: [facility]  DATE: 2/26/2024    INDICATION: cough  COMPARISON: 08/12/2015.      Impression    IMPRESSION: Negative chest.       Medications   ipratropium - albuterol 0.5 mg/2.5 mg/3 mL (DUONEB) neb solution 3 mL (3 mLs Nebulization $Given 2/26/24 1814)         Assessments & Plan (with Medical Decision Making)  Jaskaran Car is a 63 year old male who presented to the ED complaining of a cough.  He has had a cough since the middle of January but reports it acutely worsened in the last few days.  No associated fever.  On arrival he had mildly elevated blood pressure but otherwise normal vital signs.  Oxygenation 97% on room air.  No evidence of respiratory distress however he was coughing frequently which sounded a little wheezy.  Lung sounds fortunately demonstrated good air movement with no obvious crackles to suggest pneumonia.  Patient was administered a DuoNeb here which she reported alleviated his coughing for some time.  The chest x-ray showed no acute consolidations to suggest pneumonia.  Viral screening was negative.  I do not think further workup is warranted given his reassuring exam and vital signs.  Clinically I  suspect a bronchitis based on his symptomology.  Because he is immunosuppressed I think it would be reasonable to cover him with antibiotic therapy.  Azithromycin will be prescribed.  I will also give him a 5-day course of prednisone as well as an albuterol inhaler to be used as needed.  He was advised to follow-up with his clinic provider in a week if no improvement with these therapies.  Return precautions were also provided.  All questions answered and patient discharged home in stable condition.     I have reviewed the nursing notes.    I have reviewed the findings, diagnosis, plan and need for follow up with the patient.      Discharge Medication List as of 2/26/2024  7:17 PM        START taking these medications    Details   albuterol (VENTOLIN HFA) 108 (90 Base) MCG/ACT inhaler Inhale 2 puffs into the lungs every 6 hours as needed for shortness of breath, wheezing or cough, Disp-18 g, R-0, E-PrescribePharmacy may dispense brand covered by insurance (Proair, or proventil or ventolin or generic albuterol inhaler)      azithromycin (ZITHROMAX) 250 MG tablet Take 2 tablets (500 mg) by mouth daily for 1 day, THEN 1 tablet (250 mg) daily for 4 days., Disp-6 tablet, R-0, E-Prescribe      !! predniSONE (DELTASONE) 20 MG tablet Take two tablets (= 40mg) each day for 5 (five) days, Disp-10 tablet, R-0, E-Prescribe       !! - Potential duplicate medications found. Please discuss with provider.          Final diagnoses:   Acute bronchitis     Note: Chart documentation done in part with Dragon Voice Recognition software. Although reviewed after completion, some word and grammatical errors may remain.    2/26/2024   [facility] EMERGENCY DEPT       Juana Montalvo PA-C  02/26/24 2015

## 2024-02-27 NOTE — DISCHARGE INSTRUCTIONS
I think you probably have bronchitis.  Take the azithromycin for treatment of this along with the prednisone which will help reduce inflammation in your lungs.  Use the albuterol inhaler as needed for increased coughing episodes or shortness of breath.  Follow-up with your clinic provider in 5 to 7 days if no improvement with these therapies.  If you do have any worsening symptoms please return to the emergency department.    Thank you for choosing [facility]. It was a pleasure taking care of you today. If you have any questions, please call [phone].    Juana Montalvo PA-C

## 2024-04-10 ENCOUNTER — TRANSFERRED RECORDS (OUTPATIENT)
Dept: HEALTH INFORMATION MANAGEMENT | Facility: CLINIC | Age: 64
End: 2024-04-10
Payer: COMMERCIAL

## 2024-04-10 LAB
AST SERPL-CCNC: 35 U/L (ref 5–34)
CREATININE (EXTERNAL): 1.08 MG/DL (ref 0.5–1.3)

## 2024-04-12 ENCOUNTER — TRANSFERRED RECORDS (OUTPATIENT)
Dept: HEALTH INFORMATION MANAGEMENT | Facility: CLINIC | Age: 64
End: 2024-04-12
Payer: COMMERCIAL

## 2024-05-10 ENCOUNTER — OFFICE VISIT (OUTPATIENT)
Dept: FAMILY MEDICINE | Facility: OTHER | Age: 64
End: 2024-05-10
Payer: COMMERCIAL

## 2024-05-10 ENCOUNTER — ANCILLARY PROCEDURE (OUTPATIENT)
Dept: GENERAL RADIOLOGY | Facility: OTHER | Age: 64
End: 2024-05-10
Attending: PHYSICIAN ASSISTANT
Payer: COMMERCIAL

## 2024-05-10 VITALS
HEART RATE: 53 BPM | TEMPERATURE: 98.4 F | WEIGHT: 177.5 LBS | RESPIRATION RATE: 19 BRPM | BODY MASS INDEX: 28.53 KG/M2 | DIASTOLIC BLOOD PRESSURE: 70 MMHG | SYSTOLIC BLOOD PRESSURE: 126 MMHG | HEIGHT: 66 IN | OXYGEN SATURATION: 97 %

## 2024-05-10 DIAGNOSIS — D69.6 THROMBOCYTOPENIA (H): ICD-10-CM

## 2024-05-10 DIAGNOSIS — R09.89 CHEST CONGESTION: ICD-10-CM

## 2024-05-10 DIAGNOSIS — J20.9 ACUTE BRONCHITIS, UNSPECIFIED ORGANISM: ICD-10-CM

## 2024-05-10 DIAGNOSIS — R05.1 ACUTE COUGH: ICD-10-CM

## 2024-05-10 DIAGNOSIS — R05.1 ACUTE COUGH: Primary | ICD-10-CM

## 2024-05-10 LAB
BASOPHILS # BLD AUTO: 0 10E3/UL (ref 0–0.2)
BASOPHILS NFR BLD AUTO: 1 %
EOSINOPHIL # BLD AUTO: 0.1 10E3/UL (ref 0–0.7)
EOSINOPHIL NFR BLD AUTO: 2 %
ERYTHROCYTE [DISTWIDTH] IN BLOOD BY AUTOMATED COUNT: 11.3 % (ref 10–15)
HCT VFR BLD AUTO: 43.2 % (ref 40–53)
HGB BLD-MCNC: 15.1 G/DL (ref 13.3–17.7)
IMM GRANULOCYTES # BLD: 0 10E3/UL
IMM GRANULOCYTES NFR BLD: 1 %
LYMPHOCYTES # BLD AUTO: 1.1 10E3/UL (ref 0.8–5.3)
LYMPHOCYTES NFR BLD AUTO: 31 %
MCH RBC QN AUTO: 33.2 PG (ref 26.5–33)
MCHC RBC AUTO-ENTMCNC: 35 G/DL (ref 31.5–36.5)
MCV RBC AUTO: 95 FL (ref 78–100)
MONOCYTES # BLD AUTO: 0.3 10E3/UL (ref 0–1.3)
MONOCYTES NFR BLD AUTO: 8 %
NEUTROPHILS # BLD AUTO: 2.1 10E3/UL (ref 1.6–8.3)
NEUTROPHILS NFR BLD AUTO: 58 %
PLATELET # BLD AUTO: 152 10E3/UL (ref 150–450)
RBC # BLD AUTO: 4.55 10E6/UL (ref 4.4–5.9)
WBC # BLD AUTO: 3.7 10E3/UL (ref 4–11)

## 2024-05-10 PROCEDURE — 71046 X-RAY EXAM CHEST 2 VIEWS: CPT | Mod: TC | Performed by: RADIOLOGY

## 2024-05-10 PROCEDURE — 85025 COMPLETE CBC W/AUTO DIFF WBC: CPT | Performed by: PHYSICIAN ASSISTANT

## 2024-05-10 PROCEDURE — 99214 OFFICE O/P EST MOD 30 MIN: CPT | Performed by: PHYSICIAN ASSISTANT

## 2024-05-10 PROCEDURE — 36415 COLL VENOUS BLD VENIPUNCTURE: CPT | Performed by: PHYSICIAN ASSISTANT

## 2024-05-10 RX ORDER — BENZONATATE 200 MG/1
200 CAPSULE ORAL 3 TIMES DAILY PRN
Qty: 30 CAPSULE | Refills: 0 | Status: SHIPPED | OUTPATIENT
Start: 2024-05-10

## 2024-05-10 RX ORDER — METHYLPREDNISOLONE 4 MG
TABLET, DOSE PACK ORAL
Qty: 21 TABLET | Refills: 0 | Status: SHIPPED | OUTPATIENT
Start: 2024-05-10

## 2024-05-10 ASSESSMENT — PAIN SCALES - GENERAL: PAINLEVEL: NO PAIN (0)

## 2024-05-10 ASSESSMENT — ENCOUNTER SYMPTOMS: COUGH: 1

## 2024-05-10 NOTE — PROGRESS NOTES
"  Assessment & Plan     Acute bronchitis, unspecified organism  Acute cough  Chest congestion  Thrombocytopenia (H24) -  history noted  Overall presentation physical exam is consistent with bronchitis.  But blood cell count is low so suspected viral nature of this.  Treat as noted below and follow-up with primary care provider in the near future as recommended.  - CBC with platelets and differential; Future  - XR Chest 2 Views; Future  - CBC with platelets and differential  - methylPREDNISolone (MEDROL DOSEPAK) 4 MG tablet therapy pack; Follow Package Directions  - benzonatate (TESSALON) 200 MG capsule; Take 1 capsule (200 mg) by mouth 3 times daily as needed      BMI  Estimated body mass index is 28.39 kg/m  as calculated from the following:    Height as of this encounter: 1.684 m (5' 6.3\").    Weight as of this encounter: 80.5 kg (177 lb 8 oz).   Weight management plan: Patient was referred to their PCP to discuss a diet and exercise plan.    Judah Mccann is a 64 year old, presenting for the following health issues:  Cough      5/10/2024     9:48 AM   Additional Questions   Roomed by joshua murray   Accompanied by self         5/10/2024     9:48 AM   Patient Reported Additional Medications   Patient reports taking the following new medications none     History of Present Illness       Reason for visit:  Cough and chest congestion after a head cold  Symptom onset:  1-2 weeks ago  Symptoms include:  Cough and trouble breathing  Symptom intensity:  Moderate  Had these symptoms before:  Yes  Has tried/received treatment for these symptoms:  Yes  Previous treatment was successful:  Yes  Prior treatment description:  Z-hi and prednisone  What makes it worse:  Exercise  What makes it better:  Rest    He eats 2-3 servings of fruits and vegetables daily.He consumes 0 sweetened beverage(s) daily.He exercises with enough effort to increase his heart rate 30 to 60 minutes per day.  He exercises with enough effort to increase " "his heart rate 4 days per week.   He is taking medications regularly.         Review of Systems  Constitutional, HEENT, cardiovascular, pulmonary, GI, , musculoskeletal, neuro, skin, endocrine and psych systems are negative, except as otherwise noted.      Objective    /70   Pulse 53   Temp 98.4  F (36.9  C) (Temporal)   Resp 19   Ht 1.684 m (5' 6.3\")   Wt 80.5 kg (177 lb 8 oz)   SpO2 97%   BMI 28.39 kg/m    Body mass index is 28.39 kg/m .  Physical Exam   GENERAL: alert and no distress  RESP: prolonged expiratory phase with a harsh nonproductive cough he does have bronchospasm.  CV: regular rate and rhythm, normal S1 S2, no S3 or S4, no murmur, click or rub, no peripheral edema  ABDOMEN: soft, nontender, no hepatosplenomegaly, no masses and bowel sounds normal  MS: no gross musculoskeletal defects noted, no edema  SKIN: no suspicious lesions or rashes to exposed visible skin today.  NEURO: Normal strength and tone, mentation intact and speech normal  PSYCH: fatigued    X-ray: negative for concerning pathology to my independent review today.  Overall lung fields appear to be relatively normal.  I did contact the radiologist about findings at the right heart base up against the diaphragm and he reviews several x-rays that support more of a benign fat pad in this region.  Should he not recover well from current episode might be wise to double check a CT scan of the lungs to evaluate further.  It will be overread by radiology.    Results for orders placed or performed in visit on 05/10/24 (from the past 24 hour(s))   CBC with platelets and differential    Narrative    The following orders were created for panel order CBC with platelets and differential.  Procedure                               Abnormality         Status                     ---------                               -----------         ------                     CBC with platelets and d...[[id number]]  Abnormal            Final result         "         Please view results for these tests on the individual orders.   CBC with platelets and differential   Result Value Ref Range    WBC Count 3.7 (L) 4.0 - 11.0 10e3/uL    RBC Count 4.55 4.40 - 5.90 10e6/uL    Hemoglobin 15.1 13.3 - 17.7 g/dL    Hematocrit 43.2 40.0 - 53.0 %    MCV 95 78 - 100 fL    MCH 33.2 (H) 26.5 - 33.0 pg    MCHC 35.0 31.5 - 36.5 g/dL    RDW 11.3 10.0 - 15.0 %    Platelet Count 152 150 - 450 10e3/uL    % Neutrophils 58 %    % Lymphocytes 31 %    % Monocytes 8 %    % Eosinophils 2 %    % Basophils 1 %    % Immature Granulocytes 1 %    Absolute Neutrophils 2.1 1.6 - 8.3 10e3/uL    Absolute Lymphocytes 1.1 0.8 - 5.3 10e3/uL    Absolute Monocytes 0.3 0.0 - 1.3 10e3/uL    Absolute Eosinophils 0.1 0.0 - 0.7 10e3/uL    Absolute Basophils 0.0 0.0 - 0.2 10e3/uL    Absolute Immature Granulocytes 0.0 <=0.4 10e3/uL           Signed Electronically by: Gómez Carmen PA-C

## 2024-07-17 DIAGNOSIS — N40.1 BENIGN PROSTATIC HYPERPLASIA WITH INCOMPLETE BLADDER EMPTYING: ICD-10-CM

## 2024-07-17 DIAGNOSIS — R39.14 BENIGN PROSTATIC HYPERPLASIA WITH INCOMPLETE BLADDER EMPTYING: ICD-10-CM

## 2024-07-18 RX ORDER — OXYBUTYNIN CHLORIDE 15 MG/1
15 TABLET, EXTENDED RELEASE ORAL DAILY
Qty: 90 TABLET | Refills: 1 | Status: SHIPPED | OUTPATIENT
Start: 2024-07-18

## 2024-10-09 ENCOUNTER — TRANSFERRED RECORDS (OUTPATIENT)
Dept: HEALTH INFORMATION MANAGEMENT | Facility: CLINIC | Age: 64
End: 2024-10-09
Payer: COMMERCIAL

## 2025-01-23 DIAGNOSIS — R39.14 BENIGN PROSTATIC HYPERPLASIA WITH INCOMPLETE BLADDER EMPTYING: ICD-10-CM

## 2025-01-23 DIAGNOSIS — N40.1 BENIGN PROSTATIC HYPERPLASIA WITH INCOMPLETE BLADDER EMPTYING: ICD-10-CM

## 2025-01-25 ENCOUNTER — HEALTH MAINTENANCE LETTER (OUTPATIENT)
Age: 65
End: 2025-01-25

## 2025-01-27 RX ORDER — OXYBUTYNIN CHLORIDE 15 MG/1
15 TABLET, EXTENDED RELEASE ORAL DAILY
Qty: 90 TABLET | Refills: 0 | Status: SHIPPED | OUTPATIENT
Start: 2025-01-27

## 2025-01-27 NOTE — TELEPHONE ENCOUNTER
Due for appt. Tita given, please schedule. (Phone, ov, or evisit as appropriate).  Becki Fernandez MD

## 2025-02-17 ENCOUNTER — HOSPITAL ENCOUNTER (EMERGENCY)
Facility: CLINIC | Age: 65
Discharge: HOME | End: 2025-02-17
Attending: EMERGENCY MEDICINE | Admitting: EMERGENCY MEDICINE
Payer: COMMERCIAL

## 2025-02-17 VITALS
DIASTOLIC BLOOD PRESSURE: 81 MMHG | BODY MASS INDEX: 28.45 KG/M2 | HEIGHT: 66 IN | HEART RATE: 58 BPM | SYSTOLIC BLOOD PRESSURE: 136 MMHG | OXYGEN SATURATION: 99 % | WEIGHT: 177 LBS | RESPIRATION RATE: 18 BRPM | TEMPERATURE: 97.4 F

## 2025-02-17 DIAGNOSIS — J10.1 INFLUENZA A: ICD-10-CM

## 2025-02-17 LAB
FLUAV RNA SPEC QL NAA+PROBE: POSITIVE
FLUBV RNA RESP QL NAA+PROBE: NEGATIVE
RSV RNA SPEC NAA+PROBE: NEGATIVE
SARS-COV-2 RNA RESP QL NAA+PROBE: NEGATIVE

## 2025-02-17 PROCEDURE — 99283 EMERGENCY DEPT VISIT LOW MDM: CPT | Performed by: EMERGENCY MEDICINE

## 2025-02-17 PROCEDURE — 87637 SARSCOV2&INF A&B&RSV AMP PRB: CPT | Performed by: FAMILY MEDICINE

## 2025-02-17 PROCEDURE — 87637 SARSCOV2&INF A&B&RSV AMP PRB: CPT | Performed by: EMERGENCY MEDICINE

## 2025-02-17 ASSESSMENT — ACTIVITIES OF DAILY LIVING (ADL): ADLS_ACUITY_SCORE: 41

## 2025-02-18 NOTE — ED TRIAGE NOTES
Pt presents with flu symptoms for a week. Was in [place] and it was a miserable week. Pt has head and chest congestion.          Retinal tear and detachment warning symptoms reviewed and patient instructed to call immediately if increasing floaters, flashes, or decreasing peripheral vision.

## 2025-02-18 NOTE — ED PROVIDER NOTES
History     Chief Complaint   Patient presents with    Flu Symptoms     HPI  Jaskaran Car is a 64 year old male who presents for evaluation of flulike symptoms.  These symptoms began 8 days ago.  He then flew to [place].  He initially had significant cough, body aches and some dyspnea.  Symptoms have improved over the last few days.  He just flew back from [place] tonight and wanted to have this checked out more.    Allergies:  Allergies   Allergen Reactions    Oxycontin [Oxycodone Hydrochloride-Polysorbate 80] Itching       Problem List:    Patient Active Problem List    Diagnosis Date Noted    High risk medication use 06/12/2013     Priority: High    Thrombocytopenia 06/12/2013     Priority: High     Diagnosis updated by automated process. Provider to review and confirm.      Psoriatic arthritis (H) 12/21/2012     Priority: High    Acute pharyngitis 08/12/2015     Priority: Medium    Impaired fasting glucose 07/13/2015     Priority: Medium    Hyperlipidemia with target LDL less than 130 07/08/2013     Priority: Medium     Diagnosis updated by automated process. Provider to review and confirm.      High risk medications (not anticoagulants) long-term use 12/21/2012     Priority: Medium    Inverse psoriasis 06/26/2012     Priority: Medium    Pain in joint, ankle and foot 04/18/2012     Priority: Medium    Obstructive sleep apnea- CPAP at 5 cm water 02/23/2012     Priority: Medium    Patellar tendinitis 09/20/2011     Priority: Medium    Knee pain 09/20/2011     Priority: Medium    Benign prostatic hyperplasia with urinary frequency 03/17/2008     Priority: Medium    Cholecystitis 11/28/2006     Priority: Medium     Problem list name updated by automated process. Provider to review      Displacement of lumbar intervertebral disc without myelopathy 12/23/2002     Priority: Medium     L4/5          Past Medical History:    Past Medical History:   Diagnosis Date    Arthritis     Chondromalacia of patella      Displacement of lumbar intervertebral disc without myelopathy 10/2002    External hemorrhoids     Leukopenia 05/01/2013    Leukopenia     Obstructive sleep apnea- CPAP at 5 cm water 02/23/2012    Right ankle pain 06/2006    Right knee pain summer 2007    Thrombocytopaenia 06/12/2013    Thrombocytopenia        Past Surgical History:    Past Surgical History:   Procedure Laterality Date    ABDOMEN SURGERY      ARTHROSCOPY KNEE  08/30/2012    Procedure: ARTHROSCOPY KNEE;  Left knee arthroscopy, debridement and synovial biopsy;  Surgeon: Ivan Wiggins MD;  Location: [facility] OR    CHOLECYSTECTOMY, LAPOROSCOPIC  12/21/2006    COLONOSCOPY  09/30/2008    neg, recheck in 10 years    COLONOSCOPY N/A 09/30/2019    Procedure: COLONOSCOPY;  Surgeon: Patrick Garcia MD;  Location:  GI    DAVINCI HERNIORRHAPHY INGUINAL Left 8/22/2022    Procedure: Robot-assisted laparoscopic left inguinal hernia repair with mesh;  Surgeon: Jem Lucero DO;  Location: [facility] OR    EYE SURGERY      [facility] UGI ENDOSCOPY DIAG W OR W/O BRUSH/WASH  12/11/2006    normal    KNEE SURGERY      SOFT TISSUE SURGERY      FDL transfer    ZZC STRESS TEST - REGULAR ([place])  01/01/2006    negative stress thallium       Family History:    Family History   Problem Relation Age of Onset    Lipids Father         medication    Coronary Artery Disease Father     Hypertension Father         medicaton    Hyperlipidemia Father     Cerebrovascular Disease Father         11/11/2020    Other Cancer Father         Lung  2020    Arthritis Mother         rheumatoid    Arthritis Sister     Asthma No family hx of     C.A.D. No family hx of     Diabetes No family hx of     Breast Cancer No family hx of     Cancer - colorectal No family hx of     Prostate Cancer No family hx of     Cancer No family hx of     Blood Disease No family hx of     Alzheimer Disease No family hx of     Cardiovascular No family hx of     Circulatory No family hx of     Eye Disorder No  "family hx of     Gastrointestinal Disease No family hx of     Genitourinary Problems No family hx of     Heart Disease No family hx of     Musculoskeletal Disorder No family hx of     Neurologic Disorder No family hx of     Respiratory No family hx of     Thyroid Disease No family hx of        Social History:  Marital Status:   [2]  Social History     Tobacco Use    Smoking status: Never    Smokeless tobacco: Never    Tobacco comments:     no smokers in the household   Vaping Use    Vaping status: Never Used   Substance Use Topics    Alcohol use: Yes     Comment: 4 to 5 drinks a week    Drug use: Never        Medications:    albuterol (VENTOLIN HFA) 108 (90 Base) MCG/ACT inhaler  benzonatate (TESSALON) 200 MG capsule  Cholecalciferol (VITAMIN D3 PO)  HUMIRA *CF* PEN 40 MG/0.4ML pen kit  MAGNESIUM PO  methylPREDNISolone (MEDROL DOSEPAK) 4 MG tablet therapy pack  Omega-3 Fatty Acids (FISH OIL) 1000 MG CPDR  order for DME  oxyBUTYnin ER (DITROPAN XL) 15 MG 24 hr tablet  Zinc 15 MG CAPS          Review of Systems  All other systems are reviewed and are negative    Physical Exam   BP: (!) 140/84  Pulse: 59  Temp: 96.8  F (36  C)  Resp: 18  Height: 167.6 cm (5' 6\")  Weight: 80.3 kg (177 lb)  SpO2: 100 %      Physical Exam  Vitals and nursing note reviewed.   Constitutional:       General: He is not in acute distress.     Appearance: He is well-developed. He is not diaphoretic.   HENT:      Head: Normocephalic and atraumatic.      Nose: Nose normal.      Mouth/Throat:      Mouth: Mucous membranes are moist.      Pharynx: Oropharynx is clear.   Eyes:      General: No scleral icterus.        Right eye: No discharge.         Left eye: No discharge.      Conjunctiva/sclera: Conjunctivae normal.   Cardiovascular:      Rate and Rhythm: Normal rate and regular rhythm.      Heart sounds: Normal heart sounds. No murmur heard.  Pulmonary:      Effort: Pulmonary effort is normal. No respiratory distress.      Breath sounds: " Normal breath sounds. No stridor.   Abdominal:      General: There is no distension.      Palpations: Abdomen is soft.      Tenderness: There is no abdominal tenderness. There is no guarding or rebound.   Musculoskeletal:         General: Normal range of motion.      Cervical back: Normal range of motion and neck supple.   Skin:     General: Skin is warm and dry.      Coloration: Skin is not pale.      Findings: No erythema or rash.   Neurological:      General: No focal deficit present.      Mental Status: He is alert.      Cranial Nerves: No cranial nerve deficit.      Motor: No abnormal muscle tone.   Psychiatric:         Mood and Affect: Mood normal.         ED Course        Procedures                Results for orders placed or performed during the hospital encounter of 02/17/25 (from the past 24 hours)   Influenza A/B, RSV and SARS-CoV2 PCR (COVID-19) Nasopharyngeal    Specimen: Nasopharyngeal; Swab   Result Value Ref Range    Influenza A PCR Positive (A) Negative    Influenza B PCR Negative Negative    RSV PCR Negative Negative    SARS CoV2 PCR Negative Negative    Narrative    Testing was performed using the Xpert Xpress CoV2/Flu/RSV Assay on the [employer] Gene[employer]pert Instrument. This test should be ordered for the detection of SARS-CoV2, influenza, and RSV viruses in individuals with signs and symptoms of respiratory tract infection. This test is for in vitro diagnostic use under the [place] FDA for laboratories certified under CLIA to perform high or moderate complexity testing. This test has been [place] FDA cleared. A negative result does not rule out the presence of PCR inhibitors in the specimen or target RNA in concentration below the limit of detection for the assay. If only one viral target is positive but coinfection with multiple targets is suspected, the sample should be re-tested with another FDA cleared, approved, or authorized test, if coninfection would change clinical management. This test was validated  by the [facility]. These laboratories are certified under the Clinical Laboratory Improvement Amendments of 1988 (CLIA-88) as qualified to perfom high complexity laboratory testing.       Medications - No data to display    Assessments & Plan (with Medical Decision Making)  64-year-old male who presented just after getting off the plane from returning from [place].  Has been having symptoms of cough, body aches and dyspnea.  He tested positive for influenza A.  He was reassured by this.  Symptoms have been improving.  Did offer him further workup with blood work, chest x-ray and/or EKG to rule out any cardiac ischemia, pneumonia or other concerns.  He declined any further workup.  He stated he was just happy to know what was probably causing his symptoms and that his symptoms were improving he felt comfortable returning home.  Did offer he can return at any time if he would like further workup.     I have reviewed the nursing notes.    I have reviewed the findings, diagnosis, plan and need for follow up with the patient.        New Prescriptions    No medications on file       Final diagnoses:   Influenza A       2/17/2025   [facility] EMERGENCY DEPT       Real Jordan MD  02/17/25 1928

## 2025-02-18 NOTE — DISCHARGE INSTRUCTIONS
Plenty of rest and fluids.  Tylenol as needed for any fever.  Return if any worsening chest pain or would like any further workup for this.

## 2025-03-11 ENCOUNTER — APPOINTMENT (OUTPATIENT)
Dept: LAB | Facility: OTHER | Age: 65
End: 2025-03-11
Attending: PHYSICIAN ASSISTANT
Payer: COMMERCIAL

## 2025-03-11 ENCOUNTER — VIRTUAL VISIT (OUTPATIENT)
Dept: FAMILY MEDICINE | Facility: CLINIC | Age: 65
End: 2025-03-11
Payer: COMMERCIAL

## 2025-03-11 DIAGNOSIS — J02.9 SORE THROAT: Primary | ICD-10-CM

## 2025-03-11 LAB
DEPRECATED S PYO AG THROAT QL EIA: NEGATIVE
FLUAV AG SPEC QL IA: NEGATIVE
FLUBV AG SPEC QL IA: NEGATIVE
S PYO DNA THROAT QL NAA+PROBE: NOT DETECTED

## 2025-03-11 PROCEDURE — 87804 INFLUENZA ASSAY W/OPTIC: CPT | Performed by: PHYSICIAN ASSISTANT

## 2025-03-11 PROCEDURE — 98005 SYNCH AUDIO-VIDEO EST LOW 20: CPT | Performed by: PHYSICIAN ASSISTANT

## 2025-03-11 PROCEDURE — 87651 STREP A DNA AMP PROBE: CPT | Performed by: PHYSICIAN ASSISTANT

## 2025-03-11 ASSESSMENT — ENCOUNTER SYMPTOMS: SORE THROAT: 1

## 2025-03-11 NOTE — PROGRESS NOTES
"Ramy is a 64 year old who is being evaluated via a billable video visit.    How would you like to obtain your AVS? MyChart  If the video visit is dropped, the invitation should be resent by: Text to cell phone: [phone]  Will anyone else be joining your video visit? No      Assessment & Plan     Sore throat  -strep and flu negative  -symptomatic cares discussed   -discussed alarm signs and symptoms to monitor for and discussed when to be reevaluated in the UC or ED   Follow-up prn  - Streptococcus A Rapid Screen w/Reflex to PCR - Clinic Collect  - Influenza A & B Antigen - Clinic Collect  - Group A Streptococcus PCR Throat Swab          BMI  Estimated body mass index is 28.57 kg/m  as calculated from the following:    Height as of 2/17/25: 1.676 m (5' 6\").    Weight as of 2/17/25: 80.3 kg (177 lb).             Subjective   Ramy is a 64 year old, presenting for the following health issues:  Pharyngitis (Having sx of sore throat, runny nose and congested for the last day)        3/11/2025    10:21 AM   Additional Questions   Roomed by Batsheva     -sore throat, body aches, headche, ear plugging  -wife had strep yesterday  -sneezing as well  -no Sob or BRAUN      History of Present Illness       Reason for visit:  Poissible strep  Symptom onset:  Today  Symptoms include:  Runny nose, sore throat, headache  Symptom intensity:  Moderate  Symptom progression:  Worsening  Had these symptoms before:  Yes  Has tried/received treatment for these symptoms:  Yes   He is taking medications regularly.          Objective    Vitals - Patient Reported  Weight (Patient Reported): 77.1 kg (170 lb)  Height (Patient Reported): 167.6 cm (5' 6\")  BMI (Based on Pt Reported Ht/Wt): 27.44        Physical Exam   GENERAL: alert and no distress  EYES: Eyes grossly normal to inspection.  No discharge or erythema, or obvious scleral/conjunctival abnormalities.  RESP: No audible wheeze, cough, or visible cyanosis.    SKIN: Visible skin clear. " No significant rash, abnormal pigmentation or lesions.  NEURO: Cranial nerves grossly intact.  Mentation and speech appropriate for age.  PSYCH: Appropriate affect, tone, and pace of words    Results for orders placed or performed in visit on 03/11/25   Streptococcus A Rapid Screen w/Reflex to PCR - Clinic Collect     Status: Normal    Specimen: Throat; Swab   Result Value Ref Range    Group A Strep antigen Negative Negative   Influenza A & B Antigen - Clinic Collect     Status: Normal    Specimen: Nose; Swab   Result Value Ref Range    Influenza A antigen Negative Negative    Influenza B antigen Negative Negative    Narrative    Test results must be correlated with clinical data. If necessary, results should be confirmed by a molecular assay or viral culture.   Group A Streptococcus PCR Throat Swab     Status: Normal    Specimen: Throat; Swab   Result Value Ref Range    Group A strep by PCR Not Detected Not Detected    Narrative    The Xpert Xpress Strep A test, performed on the [employer], is a rapid, qualitative in vitro diagnostic test for the detection of Streptococcus pyogenes (Group A ß-hemolytic Streptococcus, Strep A) in throat swab specimens from patients with signs and symptoms of pharyngitis. The Xpert Xpress Strep A test can be used as an aid in the diagnosis of Group A Streptococcal pharyngitis. The assay is not intended to monitor treatment for Group A Streptococcus infections. The Xpert Xpress Strep A test utilizes an automated real-time polymerase chain reaction (PCR) to detect Streptococcus pyogenes DNA.         Video-Visit Details    Type of service:  Video Visit   Originating Location (pt. Location): Home    Distant Location (provider location):  On-site  Platform used for Video Visit: Savage  Signed Electronically by: Jessika Vega PA-C

## 2025-04-09 ENCOUNTER — TRANSFERRED RECORDS (OUTPATIENT)
Dept: HEALTH INFORMATION MANAGEMENT | Facility: CLINIC | Age: 65
End: 2025-04-09
Payer: COMMERCIAL

## 2025-04-30 SDOH — HEALTH STABILITY: PHYSICAL HEALTH: ON AVERAGE, HOW MANY MINUTES DO YOU ENGAGE IN EXERCISE AT THIS LEVEL?: 40 MIN

## 2025-04-30 SDOH — HEALTH STABILITY: PHYSICAL HEALTH: ON AVERAGE, HOW MANY DAYS PER WEEK DO YOU ENGAGE IN MODERATE TO STRENUOUS EXERCISE (LIKE A BRISK WALK)?: 4 DAYS

## 2025-04-30 ASSESSMENT — SOCIAL DETERMINANTS OF HEALTH (SDOH): HOW OFTEN DO YOU GET TOGETHER WITH FRIENDS OR RELATIVES?: ONCE A WEEK

## 2025-05-05 ENCOUNTER — OFFICE VISIT (OUTPATIENT)
Dept: FAMILY MEDICINE | Facility: OTHER | Age: 65
End: 2025-05-05
Payer: COMMERCIAL

## 2025-05-05 VITALS
HEIGHT: 66 IN | TEMPERATURE: 98.2 F | WEIGHT: 180 LBS | SYSTOLIC BLOOD PRESSURE: 125 MMHG | BODY MASS INDEX: 28.93 KG/M2 | DIASTOLIC BLOOD PRESSURE: 75 MMHG | OXYGEN SATURATION: 96 % | HEART RATE: 56 BPM | RESPIRATION RATE: 15 BRPM

## 2025-05-05 DIAGNOSIS — Z00.00 ROUTINE GENERAL MEDICAL EXAMINATION AT A HEALTH CARE FACILITY: Primary | ICD-10-CM

## 2025-05-05 DIAGNOSIS — N40.1 BENIGN PROSTATIC HYPERPLASIA WITH INCOMPLETE BLADDER EMPTYING: ICD-10-CM

## 2025-05-05 DIAGNOSIS — R39.14 BENIGN PROSTATIC HYPERPLASIA WITH INCOMPLETE BLADDER EMPTYING: ICD-10-CM

## 2025-05-05 DIAGNOSIS — Z13.6 SCREENING FOR CARDIOVASCULAR CONDITION: ICD-10-CM

## 2025-05-05 DIAGNOSIS — L40.50 PSORIATIC ARTHRITIS (H): ICD-10-CM

## 2025-05-05 DIAGNOSIS — E78.5 HYPERLIPIDEMIA WITH TARGET LDL LESS THAN 130: ICD-10-CM

## 2025-05-05 LAB
CHOLEST SERPL-MCNC: 201 MG/DL
FASTING STATUS PATIENT QL REPORTED: YES
HDLC SERPL-MCNC: 58 MG/DL
LDLC SERPL CALC-MCNC: 129 MG/DL
NONHDLC SERPL-MCNC: 143 MG/DL
TRIGL SERPL-MCNC: 68 MG/DL

## 2025-05-05 PROCEDURE — 1126F AMNT PAIN NOTED NONE PRSNT: CPT | Performed by: FAMILY MEDICINE

## 2025-05-05 PROCEDURE — 99213 OFFICE O/P EST LOW 20 MIN: CPT | Mod: 25 | Performed by: FAMILY MEDICINE

## 2025-05-05 PROCEDURE — 3078F DIAST BP <80 MM HG: CPT | Performed by: FAMILY MEDICINE

## 2025-05-05 PROCEDURE — 90471 IMMUNIZATION ADMIN: CPT | Performed by: FAMILY MEDICINE

## 2025-05-05 PROCEDURE — 3074F SYST BP LT 130 MM HG: CPT | Performed by: FAMILY MEDICINE

## 2025-05-05 PROCEDURE — 80061 LIPID PANEL: CPT | Performed by: FAMILY MEDICINE

## 2025-05-05 PROCEDURE — 90677 PCV20 VACCINE IM: CPT | Performed by: FAMILY MEDICINE

## 2025-05-05 PROCEDURE — 99397 PER PM REEVAL EST PAT 65+ YR: CPT | Mod: 25 | Performed by: FAMILY MEDICINE

## 2025-05-05 PROCEDURE — 36415 COLL VENOUS BLD VENIPUNCTURE: CPT | Performed by: FAMILY MEDICINE

## 2025-05-05 RX ORDER — TAMSULOSIN HYDROCHLORIDE 0.4 MG/1
0.4 CAPSULE ORAL DAILY
Qty: 90 CAPSULE | Refills: 3 | Status: SHIPPED | OUTPATIENT
Start: 2025-05-05

## 2025-05-05 RX ORDER — OXYBUTYNIN CHLORIDE 15 MG/1
15 TABLET, EXTENDED RELEASE ORAL DAILY
Qty: 90 TABLET | Refills: 0 | Status: CANCELLED | OUTPATIENT
Start: 2025-05-05

## 2025-05-05 ASSESSMENT — PAIN SCALES - GENERAL: PAINLEVEL_OUTOF10: NO PAIN (0)

## 2025-05-05 NOTE — PATIENT INSTRUCTIONS
Patient Education   Preventive Care Advice   This is general advice given by our system to help you stay healthy. However, your care team may have specific advice just for you. Please talk to your care team about your preventive care needs.  Nutrition  Eat 5 or more servings of fruits and vegetables each day.  Try wheat bread, brown rice and whole grain pasta (instead of white bread, rice, and pasta).  Get enough calcium and vitamin D. Check the label on foods and aim for 100% of the RDA (recommended daily allowance).  Lifestyle  Exercise at least 150 minutes each week  (30 minutes a day, 5 days a week).  Do muscle strengthening activities 2 days a week. These help control your weight and prevent disease.  No smoking.  Wear sunscreen to prevent skin cancer.  Have a dental exam and cleaning every 6 months.  Yearly exams  See your health care team every year to talk about:  Any changes in your health.  Any medicines your care team has prescribed.  Preventive care, family planning, and ways to prevent chronic diseases.  Shots (vaccines)   HPV shots (up to age 26), if you've never had them before.  Hepatitis B shots (up to age 59), if you've never had them before.  COVID-19 shot: Get this shot when it's due.  Flu shot: Get a flu shot every year.  Tetanus shot: Get a tetanus shot every 10 years.  Pneumococcal, hepatitis A, and RSV shots: Ask your care team if you need these based on your risk.  Shingles shot (for age 50 and up)  General health tests  Diabetes screening:  Starting at age 35, Get screened for diabetes at least every 3 years.  If you are younger than age 35, ask your care team if you should be screened for diabetes.  Cholesterol test: At age 39, start having a cholesterol test every 5 years, or more often if advised.  Bone density scan (DEXA): At age 50, ask your care team if you should have this scan for osteoporosis (brittle bones).  Hepatitis C: Get tested at least once in your life.  STIs (sexually  transmitted infections)  Before age 24: Ask your care team if you should be screened for STIs.  After age 24: Get screened for STIs if you're at risk. You are at risk for STIs (including HIV) if:  You are sexually active with more than one person.  You don't use condoms every time.  You or a partner was diagnosed with a sexually transmitted infection.  If you are at risk for HIV, ask about PrEP medicine to prevent HIV.  Get tested for HIV at least once in your life, whether you are at risk for HIV or not.  Cancer screening tests  Cervical cancer screening: If you have a cervix, begin getting regular cervical cancer screening tests starting at age 21.  Breast cancer scan (mammogram): If you've ever had breasts, begin having regular mammograms starting at age 40. This is a scan to check for breast cancer.  Colon cancer screening: It is important to start screening for colon cancer at age 45.  Have a colonoscopy test every 10 years (or more often if you're at risk) Or, ask your provider about stool tests like a FIT test every year or Cologuard test every 3 years.  To learn more about your testing options, visit:   .  For help making a decision, visit:   https://bit.ly/qi29040.  Prostate cancer screening test: If you have a prostate, ask your care team if a prostate cancer screening test (PSA) at age 55 is right for you.  Lung cancer screening: If you are a current or former smoker ages 50 to 80, ask your care team if ongoing lung cancer screenings are right for you.  For informational purposes only. Not to replace the advice of your health care provider. Copyright   2023 [employer]. All rights reserved. Clinically reviewed by the [facility] Transitions Program. [employer] 923264 - REV 01/24.

## 2025-05-05 NOTE — PROGRESS NOTES
Preventive Care Visit  [facility]  Becki Fernandez MD, MD, Family Medicine  May 5, 2025      Assessment & Plan         ICD-10-CM    1. Routine general medical examination at a health care facility  Z00.00       2. Benign prostatic hyperplasia with incomplete bladder emptying  N40.1 tamsulosin (FLOMAX) 0.4 MG capsule    R39.14       3. Screening for cardiovascular condition  Z13.6       4. Hyperlipidemia with target LDL less than 130  E78.5 Lipid panel reflex to direct LDL Fasting     Lipid panel reflex to direct LDL Fasting      5. Psoriatic arthritis (H)  L40.50           Consent was obtained from the patient to use an AI documentation tool in the creation of this note.    Assessment & Plan  Routine general medical examination at a health care facility  - Pneumonia vaccine administered. Labs ordered for cholesterol monitoring. Patient is advised to maintain a healthy diet and exercise routine to manage cholesterol levels.    Benign prostatic hyperplasia with incomplete bladder emptying  - AUS score of 9 noted on intake questions with most bothersome being the nocturia.  - Incomplete bladder emptying likely due to benign prostatic hyperplasia. Current symptoms include frequency and urgency. Ditropan may not be providing sufficient benefit for symptoms.  - Switch to flomax to address incomplete emptying and improve urine flow. Monitor symptom severity score for changes. Patient should report any changes in symptoms or side effects.    Hyperlipidemia with target LDL less than 130  - Cholesterol labs ordered. Patient is encouraged to follow up with results and discuss potential dietary or medication adjustments if necessary.    [employer] paperwork completed today as well.      No LOS data to display   Time spent by me today doing chart review, history and exam, documentation and further activities per the note    Becki Fernandez MD     Patient has been advised of split billing requirements and  "indicates understanding: Yes        BMI  Estimated body mass index is 29.07 kg/m  as calculated from the following:    Height as of this encounter: 1.676 m (5' 5.98\").    Weight as of this encounter: 81.6 kg (180 lb).   Weight management plan: Discussed healthy diet and exercise guidelines    Counseling  Appropriate preventive services were addressed with this patient via screening, questionnaire, or discussion as appropriate for fall prevention, nutrition, physical activity, Tobacco-use cessation, social engagement, weight loss and cognition.  Checklist reviewing preventive services available has been given to the patient.  Reviewed patient's diet, addressing concerns and/or questions.           Judah Mccann is a 64 year old, presenting for the following:  Physical        5/5/2025     3:09 PM   Additional Questions   Roomed by jarocho   Accompanied by self          HPI  - Has been taking Humira every 2 weeks for psoriasis, which has been stable with current medications.  - Labs were last done in April of the previous year by an outside provider.  - Recent labs were done a month ago, but results are not available.  - Has a history of prostate-related narrowing and has been taking Ditropan for urinary symptoms.  - Reports urinary symptoms including incomplete emptying about half the time, urinating less than two hours after last urination about half the time, and weak stream.  - No significant issues with urgency or nocturia, typically waking once at night to urinate.  - Symptoms of urinary frequency and urgency are present, with incomplete emptying likely related to benign prostatic hyperplasia (BPH).  - No arthritic injuries, uncontrolled psychiatric disorders, seizures, or diabetes.  - Prostate-specific antigen (PSA) testing has been done previously, with no current symptoms warranting further testing.        Advance Care Planning    Discussed advance care planning with patient; informed AVS has link to " [facility].        4/30/2025   General Health   How would you rate your overall physical health? Good   Feel stress (tense, anxious, or unable to sleep) Not at all         4/30/2025   Nutrition   Diet: Regular (no restrictions)         4/30/2025   Exercise   Days per week of moderate/strenous exercise 4 days   Average minutes spent exercising at this level 40 min         4/30/2025   Social Factors   Frequency of gathering with friends or relatives Once a week   Worry food won't last until get money to buy more No   Food not last or not have enough money for food? No   Do you have housing? (Housing is defined as stable permanent housing and does not include staying outside in a car, in a tent, in an abandoned building, in an overnight shelter, or couch-surfing.) Yes   Are you worried about losing your housing? No   Lack of transportation? No   Unable to get utilities (heat,electricity)? No         4/30/2025   Fall Risk   Fallen 2 or more times in the past year? No   Trouble with walking or balance? No          4/30/2025   Activities of Daily Living- Home Safety   Needs help with the following daily activites None of the above   Safety concerns in the home None of the above         4/30/2025   Dental   Dentist two times every year? Yes         4/30/2025   Hearing Screening   Hearing concerns? None of the above         4/30/2025   Driving Risk Screening   Patient/family members have concerns about driving No         4/30/2025   General Alertness/Fatigue Screening   Have you been more tired than usual lately? No         4/30/2025   Urinary Incontinence Screening   Bothered by leaking urine in past 6 months No         Today's PHQ-2 Score:       5/5/2025     3:04 PM   PHQ-2 ( 1999 [employer])   Q1: Little interest or pleasure in doing things 0   Q2: Feeling down, depressed or hopeless 0   PHQ-2 Score 0    Q1: Little interest or pleasure in doing things Not at all   Q2: Feeling down, depressed or hopeless Not at all    PHQ-2 Score 0       Patient-reported           4/30/2025   Substance Use   Alcohol more than 3/day or more than 7/wk No   Do you have a current opioid prescription? No   How severe/bad is pain from 1 to 10? 1/10   Do you use any other substances recreationally? No     Social History     Tobacco Use    Smoking status: Never    Smokeless tobacco: Never    Tobacco comments:     no smokers in the household   Vaping Use    Vaping status: Never Used   Substance Use Topics    Alcohol use: Yes     Comment: 4 to 5 drinks a week    Drug use: Never           4/30/2025   AAA Screening   Family history of Abdominal Aortic Aneurysm (AAA)? No   Last PSA:   PSA   Date Value Ref Range Status   02/12/2021 0.30 0 - 4 ug/L Final     Comment:     Assay Method:  Chemiluminescence using Siemens Vista analyzer     Prostate Specific Antigen Screen   Date Value Ref Range Status   11/29/2023 0.28 0.00 - 4.50 ng/mL Final     ASCVD Risk   The 10-year ASCVD risk score (Vishal ALEMAN, et al., 2019) is: 12.4%    Values used to calculate the score:      Age: 64 years      Sex: Male      Is Non- : No      Diabetic: No      Tobacco smoker: No      Systolic Blood Pressure: 125 mmHg      Is BP treated: No      HDL Cholesterol: 66 mg/dL      Total Cholesterol: 256 mg/dL            Reviewed and updated as needed this visit by Provider   Tobacco  Allergies  Meds  Problems  Med Hx  Surg Hx  Fam Hx              Current providers sharing in care for this patient include:  Patient Care Team:  Becki Fernandez MD as PCP - General (Family Practice)  Becki Fernandez MD as Assigned PCP  Candido Plummer DPM as Assigned Musculoskeletal Provider  Junior Dykes DPM as Assigned Surgical Provider    The following health maintenance items are reviewed in Epic and correct as of today:  Health Maintenance   Topic Date Due    RSV VACCINE (1 - Risk 60-74 years 1-dose series) Never done    COVID-19 Vaccine (3 - Moderna risk  "series) 02/02/2022    BMP  11/29/2024    LIPID  11/29/2024    ANNUAL REVIEW OF HM ORDERS  03/11/2026    MEDICARE ANNUAL WELLNESS VISIT  05/05/2026    FALL RISK ASSESSMENT  05/05/2026    DIABETES SCREENING  11/29/2026    ADVANCE CARE PLANNING  11/29/2028    COLORECTAL CANCER SCREENING  09/30/2029    DTAP/TDAP/TD IMMUNIZATION (3 - Td or Tdap) 10/09/2029    HEPATITIS C SCREENING  Completed    HIV SCREENING  Completed    PHQ-2 (once per calendar year)  Completed    INFLUENZA VACCINE  Completed    Pneumococcal Vaccine: 50+ Years  Completed    ZOSTER IMMUNIZATION  Completed    HPV IMMUNIZATION  Aged Out    MENINGITIS IMMUNIZATION  Aged Out         Review of Systems  Constitutional, HEENT, cardiovascular, pulmonary, GI, , musculoskeletal, neuro, skin, endocrine and psych systems are negative, except as otherwise noted.     Objective    Exam  /75   Pulse 56   Temp 98.2  F (36.8  C) (Temporal)   Resp 15   Ht 1.676 m (5' 5.98\")   Wt 81.6 kg (180 lb)   SpO2 96%   BMI 29.07 kg/m     Estimated body mass index is 29.07 kg/m  as calculated from the following:    Height as of this encounter: 1.676 m (5' 5.98\").    Weight as of this encounter: 81.6 kg (180 lb).    Physical Exam  GENERAL: alert and no distress  EYES: Eyes grossly normal to inspection, PERRL and conjunctivae and sclerae normal  HENT: ear canals and TM's normal, nose and mouth without ulcers or lesions  NECK: no adenopathy, no asymmetry, masses, or scars  RESP: lungs clear to auscultation - no rales, rhonchi or wheezes  CV: regular rate and rhythm, normal S1 S2, no S3 or S4, no murmur, click or rub, no peripheral edema  ABDOMEN: soft, nontender, no hepatosplenomegaly, no masses and bowel sounds normal  MS: no gross musculoskeletal defects noted, no edema  SKIN: no suspicious lesions or rashes  NEURO: Normal strength and tone, mentation intact and speech normal  PSYCH: mentation appears normal, affect normal/bright        5/5/2025   Mini Cog   Clock Draw " Score 2 Normal   3 Item Recall 3 objects recalled   Mini Cog Total Score 5             Signed Electronically by: Becki Fernandez MD, MD

## (undated) DEVICE — SU STRATAFIX PDS PLUS 2-0 SPIRAL SH 23CM SXPP1B433

## (undated) DEVICE — SUCTION MANIFOLD NEPTUNE 2 SYS 4 PORT 0702-020-000

## (undated) DEVICE — NDL INSUFFLATION 120MM VERRES 172015

## (undated) DEVICE — DAVINCI S CANNULA SEAL 8.5-13MM 420206

## (undated) DEVICE — SOL WATER IRRIG 1000ML BOTTLE 2F7114

## (undated) DEVICE — GLOVE PROTEXIS W/NEU-THERA 7.5  2D73TE75

## (undated) DEVICE — DAVINCI SI DRAPE ACCESSORY KIT 3-ARM 420290

## (undated) DEVICE — SYSTEM LAPAROVUE VISIBILITY LAPVUE10

## (undated) DEVICE — SU VICRYL 2-0 SH 27" UND J417H

## (undated) DEVICE — GLOVE ESTEEM BLUE W/NEU-THERA 8.0  2D73PB80

## (undated) DEVICE — DAVINCI HOT SHEARS TIP COVER  400180

## (undated) DEVICE — ESU GROUND PAD UNIVERSAL W/O CORD

## (undated) DEVICE — SU MONOCRYL 4-0 PS-2 18" UND Y496G

## (undated) DEVICE — KIT PATIENT POSITIONING PIGAZZI LATEX FREE 40580

## (undated) DEVICE — DRSG KERLIX 4 1/2"X4YDS ROLL 6730

## (undated) DEVICE — DAVINCI OBTURATOR 8MM BLADELESS 420023

## (undated) DEVICE — PACK GENERAL LAPAOSCOPY

## (undated) DEVICE — ESU CORD MONOPOLAR 10'  E0510

## (undated) RX ORDER — BUPIVACAINE HYDROCHLORIDE AND EPINEPHRINE 2.5; 5 MG/ML; UG/ML
INJECTION, SOLUTION EPIDURAL; INFILTRATION; INTRACAUDAL; PERINEURAL
Status: DISPENSED
Start: 2022-08-22

## (undated) RX ORDER — LIDOCAINE HYDROCHLORIDE 20 MG/ML
INJECTION, SOLUTION EPIDURAL; INFILTRATION; INTRACAUDAL; PERINEURAL
Status: DISPENSED
Start: 2022-08-22

## (undated) RX ORDER — DIPHENHYDRAMINE HYDROCHLORIDE 50 MG/ML
INJECTION INTRAMUSCULAR; INTRAVENOUS
Status: DISPENSED
Start: 2022-08-22

## (undated) RX ORDER — DEXAMETHASONE SODIUM PHOSPHATE 10 MG/ML
INJECTION, SOLUTION INTRAMUSCULAR; INTRAVENOUS
Status: DISPENSED
Start: 2022-08-22

## (undated) RX ORDER — ONDANSETRON 2 MG/ML
INJECTION INTRAMUSCULAR; INTRAVENOUS
Status: DISPENSED
Start: 2022-08-22

## (undated) RX ORDER — PROPOFOL 10 MG/ML
INJECTION, EMULSION INTRAVENOUS
Status: DISPENSED
Start: 2022-08-22

## (undated) RX ORDER — FENTANYL CITRATE 50 UG/ML
INJECTION, SOLUTION INTRAMUSCULAR; INTRAVENOUS
Status: DISPENSED
Start: 2022-08-22

## (undated) RX ORDER — DIMENHYDRINATE 50 MG/ML
INJECTION, SOLUTION INTRAMUSCULAR; INTRAVENOUS
Status: DISPENSED
Start: 2022-08-22